# Patient Record
Sex: FEMALE | Race: WHITE | NOT HISPANIC OR LATINO | Employment: PART TIME | URBAN - METROPOLITAN AREA
[De-identification: names, ages, dates, MRNs, and addresses within clinical notes are randomized per-mention and may not be internally consistent; named-entity substitution may affect disease eponyms.]

---

## 2018-11-28 ENCOUNTER — HISTORICAL (OUTPATIENT)
Dept: RADIOLOGY | Facility: HOSPITAL | Age: 43
End: 2018-11-28

## 2019-06-27 ENCOUNTER — HISTORICAL (OUTPATIENT)
Dept: ADMINISTRATIVE | Facility: HOSPITAL | Age: 44
End: 2019-06-27

## 2019-06-27 LAB
ABS NEUT (OLG): 8.41 X10(3)/MCL (ref 2.1–9.2)
ALBUMIN SERPL-MCNC: 3.8 GM/DL (ref 3.4–5)
ALBUMIN/GLOB SERPL: 1.1 RATIO (ref 1.1–2)
ALP SERPL-CCNC: 60 UNIT/L (ref 45–117)
ALT SERPL-CCNC: 26 UNIT/L (ref 12–78)
AST SERPL-CCNC: 26 UNIT/L (ref 15–37)
BASOPHILS # BLD AUTO: 0.05 X10(3)/MCL
BASOPHILS NFR BLD AUTO: 0 %
BILIRUB SERPL-MCNC: 0.4 MG/DL (ref 0.2–1)
BILIRUBIN DIRECT+TOT PNL SERPL-MCNC: 0.1 MG/DL
BILIRUBIN DIRECT+TOT PNL SERPL-MCNC: 0.3 MG/DL
BUN SERPL-MCNC: 9 MG/DL (ref 7–18)
CALCIUM SERPL-MCNC: 8.8 MG/DL (ref 8.5–10.1)
CHLORIDE SERPL-SCNC: 103 MMOL/L (ref 98–107)
CHOLEST SERPL-MCNC: 184 MG/DL
CHOLEST/HDLC SERPL: 4.3 {RATIO} (ref 0–4.4)
CO2 SERPL-SCNC: 27 MMOL/L (ref 21–32)
CREAT SERPL-MCNC: 0.7 MG/DL (ref 0.6–1.3)
EOSINOPHIL # BLD AUTO: 0.08 10*3/UL
EOSINOPHIL NFR BLD AUTO: 1 %
ERYTHROCYTE [DISTWIDTH] IN BLOOD BY AUTOMATED COUNT: 17.6 % (ref 11.5–14.5)
EST. AVERAGE GLUCOSE BLD GHB EST-MCNC: 166 MG/DL
GLOBULIN SER-MCNC: 3.5 GM/ML (ref 2.3–3.5)
GLUCOSE SERPL-MCNC: 136 MG/DL (ref 74–106)
HBA1C MFR BLD: 7.4 % (ref 4.2–6.3)
HCT VFR BLD AUTO: 38.5 % (ref 35–46)
HDLC SERPL-MCNC: 43 MG/DL
HGB BLD-MCNC: 11.8 GM/DL (ref 12–16)
IMM GRANULOCYTES # BLD AUTO: 0.04 10*3/UL
IMM GRANULOCYTES NFR BLD AUTO: 0 %
LDLC SERPL CALC-MCNC: 109 MG/DL (ref 0–130)
LYMPHOCYTES # BLD AUTO: 1.84 X10(3)/MCL
LYMPHOCYTES NFR BLD AUTO: 16 % (ref 13–40)
MCH RBC QN AUTO: 23.7 PG (ref 26–34)
MCHC RBC AUTO-ENTMCNC: 30.6 GM/DL (ref 31–37)
MCV RBC AUTO: 77.3 FL (ref 80–100)
MONOCYTES # BLD AUTO: 0.79 X10(3)/MCL
MONOCYTES NFR BLD AUTO: 7 % (ref 4–12)
NEUTROPHILS # BLD AUTO: 8.41 X10(3)/MCL
NEUTROPHILS NFR BLD AUTO: 75 X10(3)/MCL
PLATELET # BLD AUTO: 251 X10(3)/MCL (ref 130–400)
PMV BLD AUTO: 11.6 FL (ref 7.4–10.4)
POTASSIUM SERPL-SCNC: 3.5 MMOL/L (ref 3.5–5.1)
PROT SERPL-MCNC: 7.3 GM/DL (ref 6.4–8.2)
RBC # BLD AUTO: 4.98 X10(6)/MCL (ref 4–5.2)
SODIUM SERPL-SCNC: 136 MMOL/L (ref 136–145)
T3FREE SERPL-MCNC: 2.45 PG/ML (ref 2.18–3.98)
T4 FREE SERPL-MCNC: 1.14 NG/DL (ref 0.76–1.46)
TRIGL SERPL-MCNC: 160 MG/DL
TSH SERPL-ACNC: 0.58 MIU/L (ref 0.36–3.74)
VLDLC SERPL CALC-MCNC: 32 MG/DL
WBC # SPEC AUTO: 11.2 X10(3)/MCL (ref 4.5–11)

## 2019-07-16 ENCOUNTER — HISTORICAL (OUTPATIENT)
Dept: ADMINISTRATIVE | Facility: HOSPITAL | Age: 44
End: 2019-07-16

## 2019-07-16 LAB
INR PPP: 2.22 (ref 0.9–1.2)
PROTHROMBIN TIME: 24.7 SECOND(S) (ref 11.9–14.4)

## 2019-07-17 ENCOUNTER — HISTORICAL (OUTPATIENT)
Dept: RADIOLOGY | Facility: HOSPITAL | Age: 44
End: 2019-07-17

## 2019-08-19 ENCOUNTER — HISTORICAL (OUTPATIENT)
Dept: RADIOLOGY | Facility: HOSPITAL | Age: 44
End: 2019-08-19

## 2020-03-11 ENCOUNTER — HISTORICAL (OUTPATIENT)
Dept: ADMINISTRATIVE | Facility: HOSPITAL | Age: 45
End: 2020-03-11

## 2020-03-11 LAB
ABS NEUT (OLG): 6.08 X10(3)/MCL (ref 2.1–9.2)
ALBUMIN SERPL-MCNC: 3.6 GM/DL (ref 3.4–5)
ALBUMIN/GLOB SERPL: 0.9 RATIO (ref 1.1–2)
ALP SERPL-CCNC: 59 UNIT/L (ref 45–117)
ALT SERPL-CCNC: 22 UNIT/L (ref 12–78)
AST SERPL-CCNC: 12 UNIT/L (ref 15–37)
BASOPHILS # BLD AUTO: 0 X10(3)/MCL (ref 0–0.2)
BASOPHILS NFR BLD AUTO: 0 %
BILIRUB SERPL-MCNC: 0.3 MG/DL (ref 0.2–1)
BILIRUBIN DIRECT+TOT PNL SERPL-MCNC: 0.1 MG/DL (ref 0–0.2)
BILIRUBIN DIRECT+TOT PNL SERPL-MCNC: 0.2 MG/DL
BUN SERPL-MCNC: 10 MG/DL (ref 7–18)
CALCIUM SERPL-MCNC: 8.5 MG/DL (ref 8.5–10.1)
CHLORIDE SERPL-SCNC: 106 MMOL/L (ref 98–107)
CHOLEST SERPL-MCNC: 190 MG/DL
CHOLEST/HDLC SERPL: 5.4 {RATIO} (ref 0–4.4)
CO2 SERPL-SCNC: 28 MMOL/L (ref 21–32)
CREAT SERPL-MCNC: 0.6 MG/DL (ref 0.6–1.3)
EOSINOPHIL # BLD AUTO: 0.1 X10(3)/MCL (ref 0–0.9)
EOSINOPHIL NFR BLD AUTO: 1 %
ERYTHROCYTE [DISTWIDTH] IN BLOOD BY AUTOMATED COUNT: 16.8 % (ref 11.5–14.5)
EST. AVERAGE GLUCOSE BLD GHB EST-MCNC: 189 MG/DL
GLOBULIN SER-MCNC: 3.9 GM/ML (ref 2.3–3.5)
GLUCOSE SERPL-MCNC: 152 MG/DL (ref 74–106)
HBA1C MFR BLD: 8.2 % (ref 4.2–6.3)
HCT VFR BLD AUTO: 42.4 % (ref 35–46)
HDLC SERPL-MCNC: 35 MG/DL (ref 40–59)
HGB BLD-MCNC: 12.6 GM/DL (ref 12–16)
IMM GRANULOCYTES # BLD AUTO: 0.04 10*3/UL
IMM GRANULOCYTES NFR BLD AUTO: 0 %
INR PPP: 1.66 (ref 0.9–1.2)
LDLC SERPL CALC-MCNC: 89 MG/DL
LYMPHOCYTES # BLD AUTO: 2.1 X10(3)/MCL (ref 0.6–4.6)
LYMPHOCYTES NFR BLD AUTO: 24 %
MCH RBC QN AUTO: 22.2 PG (ref 26–34)
MCHC RBC AUTO-ENTMCNC: 29.7 GM/DL (ref 31–37)
MCV RBC AUTO: 74.8 FL (ref 80–100)
MONOCYTES # BLD AUTO: 0.5 X10(3)/MCL (ref 0.1–1.3)
MONOCYTES NFR BLD AUTO: 5 %
NEUTROPHILS # BLD AUTO: 6.08 X10(3)/MCL (ref 2.1–9.2)
NEUTROPHILS NFR BLD AUTO: 69 %
PLATELET # BLD AUTO: 144 X10(3)/MCL (ref 130–400)
PMV BLD AUTO: 11.7 FL (ref 7.4–10.4)
POTASSIUM SERPL-SCNC: 4.6 MMOL/L (ref 3.5–5.1)
PROT SERPL-MCNC: 7.5 GM/DL (ref 6.4–8.2)
PROTHROMBIN TIME: 19.6 SECOND(S) (ref 11.9–14.4)
RBC # BLD AUTO: 5.67 X10(6)/MCL (ref 4–5.2)
SODIUM SERPL-SCNC: 138 MMOL/L (ref 136–145)
TRIGL SERPL-MCNC: 328 MG/DL
TSH SERPL-ACNC: 3.15 MIU/L (ref 0.36–3.74)
VLDLC SERPL CALC-MCNC: 66 MG/DL
WBC # SPEC AUTO: 8.8 X10(3)/MCL (ref 4.5–11)

## 2020-09-22 ENCOUNTER — HISTORICAL (OUTPATIENT)
Dept: CARDIOLOGY | Facility: CLINIC | Age: 45
End: 2020-09-22

## 2020-09-22 LAB
ALBUMIN SERPL-MCNC: 3.6 GM/DL (ref 3.4–5)
ALBUMIN/GLOB SERPL: 1 RATIO (ref 1.1–2)
ALP SERPL-CCNC: 60 UNIT/L (ref 45–117)
ALT SERPL-CCNC: 28 UNIT/L (ref 12–78)
AST SERPL-CCNC: 32 UNIT/L (ref 15–37)
BILIRUB SERPL-MCNC: 0.4 MG/DL (ref 0.2–1)
BILIRUBIN DIRECT+TOT PNL SERPL-MCNC: <0.1 MG/DL (ref 0–0.2)
BILIRUBIN DIRECT+TOT PNL SERPL-MCNC: ABNORMAL MG/DL
BUN SERPL-MCNC: 10 MG/DL (ref 7–18)
CALCIUM SERPL-MCNC: 8.8 MG/DL (ref 8.5–10.1)
CHLORIDE SERPL-SCNC: 101 MMOL/L (ref 98–107)
CHOLEST SERPL-MCNC: 215 MG/DL
CHOLEST/HDLC SERPL: 4.8 {RATIO} (ref 0–4.4)
CO2 SERPL-SCNC: 28 MMOL/L (ref 21–32)
CREAT SERPL-MCNC: 0.8 MG/DL (ref 0.6–1.3)
GLOBULIN SER-MCNC: 3.6 GM/ML (ref 2.3–3.5)
GLUCOSE SERPL-MCNC: 177 MG/DL (ref 74–106)
HDLC SERPL-MCNC: 45 MG/DL (ref 40–59)
LDLC SERPL CALC-MCNC: 107 MG/DL
POTASSIUM SERPL-SCNC: 4 MMOL/L (ref 3.5–5.1)
PROT SERPL-MCNC: 7.2 GM/DL (ref 6.4–8.2)
SODIUM SERPL-SCNC: 136 MMOL/L (ref 136–145)
TRIGL SERPL-MCNC: 315 MG/DL
VLDLC SERPL CALC-MCNC: 63 MG/DL

## 2021-05-10 ENCOUNTER — HISTORICAL (OUTPATIENT)
Dept: INTERNAL MEDICINE | Facility: CLINIC | Age: 46
End: 2021-05-10

## 2021-05-10 LAB
ABS NEUT (OLG): 8.73 X10(3)/MCL (ref 2.1–9.2)
ALBUMIN SERPL-MCNC: 3.8 GM/DL (ref 3.5–5)
ALBUMIN/GLOB SERPL: 1.2 RATIO (ref 1.1–2)
ALP SERPL-CCNC: 60 UNIT/L (ref 40–150)
ALT SERPL-CCNC: 17 UNIT/L (ref 0–55)
AST SERPL-CCNC: 12 UNIT/L (ref 5–34)
BASOPHILS # BLD AUTO: 0.1 X10(3)/MCL (ref 0–0.2)
BASOPHILS NFR BLD AUTO: 1 %
BILIRUB SERPL-MCNC: 0.4 MG/DL
BILIRUBIN DIRECT+TOT PNL SERPL-MCNC: 0.2 MG/DL (ref 0–0.5)
BILIRUBIN DIRECT+TOT PNL SERPL-MCNC: 0.2 MG/DL (ref 0–0.8)
BUN SERPL-MCNC: 10.6 MG/DL (ref 7–18.7)
CALCIUM SERPL-MCNC: 8.9 MG/DL (ref 8.4–10.2)
CHLORIDE SERPL-SCNC: 101 MMOL/L (ref 98–107)
CHOLEST SERPL-MCNC: 190 MG/DL
CHOLEST/HDLC SERPL: 4 {RATIO} (ref 0–5)
CO2 SERPL-SCNC: 23 MMOL/L (ref 22–29)
CREAT SERPL-MCNC: 0.75 MG/DL (ref 0.55–1.02)
CREAT UR-MCNC: 66.2 MG/DL (ref 45–106)
EOSINOPHIL # BLD AUTO: 0.2 X10(3)/MCL (ref 0–0.9)
EOSINOPHIL NFR BLD AUTO: 2 %
ERYTHROCYTE [DISTWIDTH] IN BLOOD BY AUTOMATED COUNT: 17.8 % (ref 11.5–14.5)
EST. AVERAGE GLUCOSE BLD GHB EST-MCNC: 185.8 MG/DL
GLOBULIN SER-MCNC: 3.2 GM/DL (ref 2.4–3.5)
GLUCOSE SERPL-MCNC: 193 MG/DL (ref 74–100)
HBA1C MFR BLD: 8.1 %
HCT VFR BLD AUTO: 39.1 % (ref 35–46)
HDLC SERPL-MCNC: 44 MG/DL (ref 35–60)
HGB BLD-MCNC: 11.3 GM/DL (ref 12–16)
IMM GRANULOCYTES # BLD AUTO: 0.07 10*3/UL
IMM GRANULOCYTES NFR BLD AUTO: 1 %
LDLC SERPL CALC-MCNC: 96 MG/DL (ref 50–140)
LYMPHOCYTES # BLD AUTO: 2.6 X10(3)/MCL (ref 0.6–4.6)
LYMPHOCYTES NFR BLD AUTO: 21 %
MCH RBC QN AUTO: 21.1 PG (ref 26–34)
MCHC RBC AUTO-ENTMCNC: 28.9 GM/DL (ref 31–37)
MCV RBC AUTO: 73.1 FL (ref 80–100)
MICROALBUMIN UR-MCNC: 24 MG/L
MICROALBUMIN/CREAT RATIO PNL UR: 36.3 MG/GM CR (ref 0–30)
MONOCYTES # BLD AUTO: 0.6 X10(3)/MCL (ref 0.1–1.3)
MONOCYTES NFR BLD AUTO: 5 %
NEUTROPHILS # BLD AUTO: 8.73 X10(3)/MCL (ref 2.1–9.2)
NEUTROPHILS NFR BLD AUTO: 71 %
PLATELET # BLD AUTO: 206 X10(3)/MCL (ref 130–400)
PMV BLD AUTO: 11.5 FL (ref 7.4–10.4)
POTASSIUM SERPL-SCNC: 3.8 MMOL/L (ref 3.5–5.1)
PROT SERPL-MCNC: 7 GM/DL (ref 6.4–8.3)
RBC # BLD AUTO: 5.35 X10(6)/MCL (ref 4–5.2)
SODIUM SERPL-SCNC: 138 MMOL/L (ref 136–145)
T4 FREE SERPL-MCNC: 0.88 NG/DL (ref 0.7–1.48)
TRIGL SERPL-MCNC: 249 MG/DL (ref 37–140)
TSH SERPL-ACNC: 4.39 UIU/ML (ref 0.35–4.94)
VLDLC SERPL CALC-MCNC: 50 MG/DL
WBC # SPEC AUTO: 12.3 X10(3)/MCL (ref 4.5–11)

## 2021-05-31 ENCOUNTER — HISTORICAL (OUTPATIENT)
Dept: RADIOLOGY | Facility: HOSPITAL | Age: 46
End: 2021-05-31

## 2021-06-04 ENCOUNTER — HISTORICAL (OUTPATIENT)
Dept: RADIOLOGY | Facility: HOSPITAL | Age: 46
End: 2021-06-04

## 2021-11-16 ENCOUNTER — HISTORICAL (OUTPATIENT)
Dept: INTERNAL MEDICINE | Facility: CLINIC | Age: 46
End: 2021-11-16

## 2021-11-16 LAB
ABS NEUT (OLG): 7.68 X10(3)/MCL (ref 2.1–9.2)
ALBUMIN SERPL-MCNC: 4.1 GM/DL (ref 3.5–5)
ALBUMIN/GLOB SERPL: 1.3 RATIO (ref 1.1–2)
ALP SERPL-CCNC: 47 UNIT/L (ref 40–150)
ALT SERPL-CCNC: 13 UNIT/L (ref 0–55)
ANISOCYTOSIS BLD QL SMEAR: NORMAL
AST SERPL-CCNC: 17 UNIT/L (ref 5–34)
BASOPHILS # BLD AUTO: 0.1 X10(3)/MCL (ref 0–0.2)
BASOPHILS NFR BLD AUTO: 1 %
BILIRUB SERPL-MCNC: <1 MG/DL
BILIRUBIN DIRECT+TOT PNL SERPL-MCNC: 0.2 MG/DL (ref 0–0.5)
BILIRUBIN DIRECT+TOT PNL SERPL-MCNC: <0.8 MG/DL (ref 0–0.8)
BUN SERPL-MCNC: 11.6 MG/DL (ref 7–18.7)
CALCIUM SERPL-MCNC: 9.1 MG/DL (ref 8.7–10.5)
CHLORIDE SERPL-SCNC: 104 MMOL/L (ref 98–107)
CHOLEST SERPL-MCNC: 171 MG/DL
CHOLEST/HDLC SERPL: 4 {RATIO} (ref 0–5)
CO2 SERPL-SCNC: 24 MMOL/L (ref 22–29)
CREAT SERPL-MCNC: 0.69 MG/DL (ref 0.55–1.02)
EOSINOPHIL # BLD AUTO: 0.1 X10(3)/MCL (ref 0–0.9)
EOSINOPHIL NFR BLD AUTO: 1 %
ERYTHROCYTE [DISTWIDTH] IN BLOOD BY AUTOMATED COUNT: 18.8 % (ref 11.5–14.5)
GLOBULIN SER-MCNC: 3.1 GM/DL (ref 2.4–3.5)
GLUCOSE SERPL-MCNC: 158 MG/DL (ref 74–100)
HCT VFR BLD AUTO: 35.8 % (ref 35–46)
HDLC SERPL-MCNC: 44 MG/DL (ref 35–60)
HGB BLD-MCNC: 10.3 GM/DL (ref 12–16)
IMM GRANULOCYTES # BLD AUTO: 0.03 10*3/UL
IMM GRANULOCYTES NFR BLD AUTO: 0 %
LDLC SERPL CALC-MCNC: 91 MG/DL (ref 50–140)
LYMPHOCYTES # BLD AUTO: 2 X10(3)/MCL (ref 0.6–4.6)
LYMPHOCYTES NFR BLD AUTO: 19 %
MCH RBC QN AUTO: 18.8 PG (ref 26–34)
MCHC RBC AUTO-ENTMCNC: 28.8 GM/DL (ref 31–37)
MCV RBC AUTO: 65.3 FL (ref 80–100)
MICROCYTES BLD QL SMEAR: NORMAL
MONOCYTES # BLD AUTO: 0.5 X10(3)/MCL (ref 0.1–1.3)
MONOCYTES NFR BLD AUTO: 5 %
NEUTROPHILS # BLD AUTO: 7.68 X10(3)/MCL (ref 2.1–9.2)
NEUTROPHILS NFR BLD AUTO: 74 %
NRBC BLD AUTO-RTO: 0 % (ref 0–0.2)
PLATELET # BLD AUTO: 248 X10(3)/MCL (ref 130–400)
PLATELET # BLD EST: ADEQUATE 10*3/UL
PMV BLD AUTO: 10.6 FL (ref 7.4–10.4)
POLYCHROMASIA BLD QL SMEAR: NORMAL
POTASSIUM SERPL-SCNC: 3.5 MMOL/L (ref 3.5–5.1)
PROT SERPL-MCNC: 7.2 GM/DL (ref 6.4–8.3)
RBC # BLD AUTO: 5.48 X10(6)/MCL (ref 4–5.2)
SODIUM SERPL-SCNC: 139 MMOL/L (ref 136–145)
T4 FREE SERPL-MCNC: 1.1 NG/DL (ref 0.7–1.48)
TRIGL SERPL-MCNC: 179 MG/DL (ref 37–140)
TSH SERPL-ACNC: 0.66 UIU/ML (ref 0.35–4.94)
VLDLC SERPL CALC-MCNC: 36 MG/DL
WBC # SPEC AUTO: 10.3 X10(3)/MCL (ref 4.5–11)

## 2021-12-01 ENCOUNTER — HISTORICAL (OUTPATIENT)
Dept: ADMINISTRATIVE | Facility: HOSPITAL | Age: 46
End: 2021-12-01

## 2021-12-06 LAB — FINAL CULTURE: NORMAL

## 2022-04-10 ENCOUNTER — HISTORICAL (OUTPATIENT)
Dept: ADMINISTRATIVE | Facility: HOSPITAL | Age: 47
End: 2022-04-10
Payer: MEDICAID

## 2022-04-27 VITALS
SYSTOLIC BLOOD PRESSURE: 128 MMHG | HEIGHT: 64 IN | OXYGEN SATURATION: 98 % | DIASTOLIC BLOOD PRESSURE: 72 MMHG | BODY MASS INDEX: 34.7 KG/M2 | WEIGHT: 203.25 LBS

## 2022-05-03 NOTE — HISTORICAL [facility] CERNER
This is a historical note converted from Marcail. Formatting and pictures may have been removed.  Please reference Marcial for original formatting and attached multimedia. Chief Complaint  New Patient states has mitral valve and wants her thyroid checked and wants a referral to gyn back pain and sciatica  Physical Exam  Vitals & Measurements  T:?36.7? ?C (Oral)? HR:?69(Peripheral)? RR:?18? BP:?129/84?  HT:?162?cm? WT:?96.8?kg? BMI:?36.88?  Assessment/Plan  1.?Aortic valve stenosis with insufficiency?I35.2  ?sees cards  Ordered:  CBC w/ Auto Diff, Routine collect, *Est. 06/27/19 3:00:00 CDT, Blood, Order for future visit, *Est. Stop date 06/27/19 3:00:00 CDT, Lab Collect, Aortic valve stenosis with insufficiency  Obesity  MVP (mitral valve prolapse)  Edema, 06/27/19 9:33:00 CDT  Clinic Follow up, *Est. 12/27/19 3:00:00 CST, Order for future visit, Aortic valve stenosis with insufficiency  Obesity  MVP (mitral valve prolapse)  Edema, [facility]  Comprehensive Metabolic Panel, Routine collect, *Est. 06/27/19 3:00:00 CDT, Blood, Order for future visit, *Est. Stop date 06/27/19 3:00:00 CDT, Lab Collect, Aortic valve stenosis with insufficiency  Obesity  MVP (mitral valve prolapse)  Edema, 06/27/19 9:33:00 CDT  Free T4, Routine collect, *Est. 06/27/19 3:00:00 CDT, Blood, Order for future visit, *Est. Stop date 06/27/19 3:00:00 CDT, Lab Collect, Aortic valve stenosis with insufficiency  Obesity  MVP (mitral valve prolapse)  Edema, 06/27/19 9:33:00 CDT  Hemoglobin A1C [facility], Routine collect, *Est. 06/27/19 3:00:00 CDT, Blood, Order for future visit, *Est. Stop date 06/27/19 3:00:00 CDT, Lab Collect, Aortic valve stenosis with insufficiency  Obesity  MVP (mitral valve prolapse)  Edema, 06/27/19 9:33:00 CDT  Lipid Panel, Routine collect, *Est. 06/27/19 3:00:00 CDT, Blood, Order for future visit, *Est. Stop date 06/27/19 3:00:00 CDT, Lab Collect, Aortic valve stenosis with insufficiency  Obesity  MVP (mitral  valve prolapse)  Edema, 06/27/19 9:33:00 CDT  Office/Outpatient Visit Level 4 Established [id number] PC, Aortic valve stenosis with insufficiency  Obesity  MVP (mitral valve prolapse)  Edema, [facility], 06/27/19 9:33:00 CDT  T3 Free, Routine collect, *Est. 06/27/19 3:00:00 CDT, Blood, Order for future visit, *Est. Stop date 06/27/19 3:00:00 CDT, Lab Collect, Aortic valve stenosis with insufficiency  Obesity  MVP (mitral valve prolapse)  Edema, 06/27/19 9:33:00 CDT  Thyroid Stimulating Hormone, Routine collect, *Est. 06/27/19 3:00:00 CDT, Blood, Order for future visit, *Est. Stop date 06/27/19 3:00:00 CDT, Lab Collect, Aortic valve stenosis with insufficiency  Obesity  MVP (mitral valve prolapse)  Edema, 06/27/19 9:33:00 CDT  ?  2.?Obesity?E66.9  ?dash  Ordered:  CBC w/ Auto Diff, Routine collect, *Est. 06/27/19 3:00:00 CDT, Blood, Order for future visit, *Est. Stop date 06/27/19 3:00:00 CDT, Lab Collect, Aortic valve stenosis with insufficiency  Obesity  MVP (mitral valve prolapse)  Edema, 06/27/19 9:33:00 CDT  Clinic Follow up, *Est. 12/27/19 3:00:00 CST, Order for future visit, Aortic valve stenosis with insufficiency  Obesity  MVP (mitral valve prolapse)  Edema, [facility]  Comprehensive Metabolic Panel, Routine collect, *Est. 06/27/19 3:00:00 CDT, Blood, Order for future visit, *Est. Stop date 06/27/19 3:00:00 CDT, Lab Collect, Aortic valve stenosis with insufficiency  Obesity  MVP (mitral valve prolapse)  Edema, 06/27/19 9:33:00 CDT  Free T4, Routine collect, *Est. 06/27/19 3:00:00 CDT, Blood, Order for future visit, *Est. Stop date 06/27/19 3:00:00 CDT, Lab Collect, Aortic valve stenosis with insufficiency  Obesity  MVP (mitral valve prolapse)  Edema, 06/27/19 9:33:00 CDT  Hemoglobin A1C [facility], Routine collect, *Est. 06/27/19 3:00:00 CDT, Blood, Order for future visit, *Est. Stop date 06/27/19 3:00:00 CDT, Lab Collect, Aortic valve stenosis with insufficiency  Obesity  MVP (mitral valve  prolapse)  Edema, 06/27/19 9:33:00 CDT  Lipid Panel, Routine collect, *Est. 06/27/19 3:00:00 CDT, Blood, Order for future visit, *Est. Stop date 06/27/19 3:00:00 CDT, Lab Collect, Aortic valve stenosis with insufficiency  Obesity  MVP (mitral valve prolapse)  Edema, 06/27/19 9:33:00 CDT  Office/Outpatient Visit Level 4 Established [id number] PC, Aortic valve stenosis with insufficiency  Obesity  MVP (mitral valve prolapse)  Edema, [facility], 06/27/19 9:33:00 CDT  T3 Free, Routine collect, *Est. 06/27/19 3:00:00 CDT, Blood, Order for future visit, *Est. Stop date 06/27/19 3:00:00 CDT, Lab Collect, Aortic valve stenosis with insufficiency  Obesity  MVP (mitral valve prolapse)  Edema, 06/27/19 9:33:00 CDT  Thyroid Stimulating Hormone, Routine collect, *Est. 06/27/19 3:00:00 CDT, Blood, Order for future visit, *Est. Stop date 06/27/19 3:00:00 CDT, Lab Collect, Aortic valve stenosis with insufficiency  Obesity  MVP (mitral valve prolapse)  Edema, 06/27/19 9:33:00 CDT  ?  3.?MVP (mitral valve prolapse)?I34.1  ?stable  Ordered:  CBC w/ Auto Diff, Routine collect, *Est. 06/27/19 3:00:00 CDT, Blood, Order for future visit, *Est. Stop date 06/27/19 3:00:00 CDT, Lab Collect, Aortic valve stenosis with insufficiency  Obesity  MVP (mitral valve prolapse)  Edema, 06/27/19 9:33:00 CDT  Clinic Follow up, *Est. 12/27/19 3:00:00 CST, Order for future visit, Aortic valve stenosis with insufficiency  Obesity  MVP (mitral valve prolapse)  Edema, [facility]  Comprehensive Metabolic Panel, Routine collect, *Est. 06/27/19 3:00:00 CDT, Blood, Order for future visit, *Est. Stop date 06/27/19 3:00:00 CDT, Lab Collect, Aortic valve stenosis with insufficiency  Obesity  MVP (mitral valve prolapse)  Edema, 06/27/19 9:33:00 CDT  Free T4, Routine collect, *Est. 06/27/19 3:00:00 CDT, Blood, Order for future visit, *Est. Stop date 06/27/19 3:00:00 CDT, Lab Collect, Aortic valve stenosis with insufficiency  Obesity  MVP  (mitral valve prolapse)  Edema, 06/27/19 9:33:00 CDT  Hemoglobin A1C [facility], Routine collect, *Est. 06/27/19 3:00:00 CDT, Blood, Order for future visit, *Est. Stop date 06/27/19 3:00:00 CDT, Lab Collect, Aortic valve stenosis with insufficiency  Obesity  MVP (mitral valve prolapse)  Edema, 06/27/19 9:33:00 CDT  Lipid Panel, Routine collect, *Est. 06/27/19 3:00:00 CDT, Blood, Order for future visit, *Est. Stop date 06/27/19 3:00:00 CDT, Lab Collect, Aortic valve stenosis with insufficiency  Obesity  MVP (mitral valve prolapse)  Edema, 06/27/19 9:33:00 CDT  Office/Outpatient Visit Level 4 Established [id number] PC, Aortic valve stenosis with insufficiency  Obesity  MVP (mitral valve prolapse)  Edema, [facility] Int [facility], 06/27/19 9:33:00 CDT  T3 Free, Routine collect, *Est. 06/27/19 3:00:00 CDT, Blood, Order for future visit, *Est. Stop date 06/27/19 3:00:00 CDT, Lab Collect, Aortic valve stenosis with insufficiency  Obesity  MVP (mitral valve prolapse)  Edema, 06/27/19 9:33:00 CDT  Thyroid Stimulating Hormone, Routine collect, *Est. 06/27/19 3:00:00 CDT, Blood, Order for future visit, *Est. Stop date 06/27/19 3:00:00 CDT, Lab Collect, Aortic valve stenosis with insufficiency  Obesity  MVP (mitral valve prolapse)  Edema, 06/27/19 9:33:00 CDT  ?  4.?Edema?R60.9  ?stable  Ordered:  CBC w/ Auto Diff, Routine collect, *Est. 06/27/19 3:00:00 CDT, Blood, Order for future visit, *Est. Stop date 06/27/19 3:00:00 CDT, Lab Collect, Aortic valve stenosis with insufficiency  Obesity  MVP (mitral valve prolapse)  Edema, 06/27/19 9:33:00 CDT  Clinic Follow up, *Est. 12/27/19 3:00:00 CST, Order for future visit, Aortic valve stenosis with insufficiency  Obesity  MVP (mitral valve prolapse)  Edema, [facility]  Comprehensive Metabolic Panel, Routine collect, *Est. 06/27/19 3:00:00 CDT, Blood, Order for future visit, *Est. Stop date 06/27/19 3:00:00 CDT, Lab Collect, Aortic valve stenosis with insufficiency  Obesity  MVP  (mitral valve prolapse)  Edema, 06/27/19 9:33:00 CDT  Free T4, Routine collect, *Est. 06/27/19 3:00:00 CDT, Blood, Order for future visit, *Est. Stop date 06/27/19 3:00:00 CDT, Lab Collect, Aortic valve stenosis with insufficiency  Obesity  MVP (mitral valve prolapse)  Edema, 06/27/19 9:33:00 CDT  Hemoglobin A1C [facility], Routine collect, *Est. 06/27/19 3:00:00 CDT, Blood, Order for future visit, *Est. Stop date 06/27/19 3:00:00 CDT, Lab Collect, Aortic valve stenosis with insufficiency  Obesity  MVP (mitral valve prolapse)  Edema, 06/27/19 9:33:00 CDT  Lipid Panel, Routine collect, *Est. 06/27/19 3:00:00 CDT, Blood, Order for future visit, *Est. Stop date 06/27/19 3:00:00 CDT, Lab Collect, Aortic valve stenosis with insufficiency  Obesity  MVP (mitral valve prolapse)  Edema, 06/27/19 9:33:00 CDT  Office/Outpatient Visit Level 4 Established [id number] PC, Aortic valve stenosis with insufficiency  Obesity  MVP (mitral valve prolapse)  Edema, [facility] Int [facility] C, 06/27/19 9:33:00 CDT  T3 Free, Routine collect, *Est. 06/27/19 3:00:00 CDT, Blood, Order for future visit, *Est. Stop date 06/27/19 3:00:00 CDT, Lab Collect, Aortic valve stenosis with insufficiency  Obesity  MVP (mitral valve prolapse)  Edema, 06/27/19 9:33:00 CDT  Thyroid Stimulating Hormone, Routine collect, *Est. 06/27/19 3:00:00 CDT, Blood, Order for future visit, *Est. Stop date 06/27/19 3:00:00 CDT, Lab Collect, Aortic valve stenosis with insufficiency  Obesity  MVP (mitral valve prolapse)  Edema, 06/27/19 9:33:00 CDT  ?  Referrals  Clinic Follow up, *Est. 12/27/19 3:00:00 CST, Order for future visit, Aortic valve stenosis with insufficiency  Obesity  MVP (mitral valve prolapse)  Edema, [facility]   Problem List/Past Medical History  Ongoing  Aortic valve stenosis with insufficiency  Edema  MVP (mitral valve prolapse)  Obesity  Historical  Hypothyroid  Procedure/Surgical History  Bypass Valve Replacement Redo Aortic (.)  (10/15/2018)  Performance of Cardiac Output, Single, Manual (10/15/2018)  Replacement of Aortic Valve with Synthetic Substitute, Open Approach (10/15/2018)  Respiratory Ventilation, Less than 24 Consecutive Hours (10/15/2018)  Ultrasonography of Heart with Aorta, Transesophageal (10/15/2018)  Measurement of Cardiac Sampling and Pressure, Right Heart, Percutaneous Approach (10/10/2018)  Insertion of Infusion Device into Right Subclavian Vein, Percutaneous Approach (10/09/2018)  Transesophageal Echo (for Surgery) (.) (10/08/2018)  Ultrasonography of Heart with Aorta, Transesophageal (10/08/2018)  Ultrasonography of Heart with Aorta (10/05/2018)  Performance of Cardiac Output, Continuous (10/04/2018)  Mitral valve operation (2012)   section (10/27/2004)   section (1998)   section (1994)  Bilateral tubal ligation   Medications  ACETAMINOPHEN-COD #3 TABLET, 1 tab(s), Oral, q6hr,? ?Not Taking, Completed Rx: Last Dose Date/Time Unknown  Alprazolam 0.25mg Tab!, 0.25 mg= 1 tab(s), Oral, TID,? ?Not Taking, Completed Rx: Last Dose Date/Time Unknown  aspirin 81 mg oral Delayed Release (EC) tablet, 81 mg= 1 tab(s), Oral, Daily  Coumadin 5 mg oral tablet, 5 mg= 1 tab(s), Oral, Daily, 6 refills  Fluoxetine 20mg Cap!, 20 mg= 1 cap(s), Oral, Daily  hydrochlorothiazide-triamterene 25 mg-37.5 mg oral capsule, 1 cap(s), Oral, Daily, 6 refills  Levothyroxine Sodium 0.15mg Tab, 150 mcg= 1 tab(s), Oral, Daily  metoprolol tartrate 25 mg oral tab, 25 mg= 1 tab(s), Oral, BID, 6 refills  OXYCODONE HCL 5 MG TABLET,? ?Not Taking, Completed Rx: Last Dose Date/Time Unknown  ProAir HFA 90 mcg/inh inhalation aerosol with adapter, 2 puff(s), INH, q6hr, PRN  triamcinolone 55 mcg/inh nasal spray, 2 spray(s), Both Nostrils, Daily  warfarin 5 mg oral tablet, See Instructions  warfarin 5 mg oral tablet, See Instructions  Allergies  LaMICtal?(swelling)  Zofran?(whelps, itching)  Social History  Abuse/Neglect  No,  No, Yes, 06/27/2019  Alcohol  Past, 02/12/2019  Employment/School  Employed, Work/School description: physical job. Highest education level: Some college. No, 06/27/2019  Employed, 02/12/2019  Home/Environment  Lives with Spouse. Living situation: Home/Independent. TV/Computer concerns: No. Single family house, 06/27/2019  Lives with Spouse., 02/12/2019  Nutrition/Health  Regular, 02/12/2019  Sexual  Gender Identity Identifies as female., 02/12/2019  Substance Use  Past, 02/12/2019  Tobacco - Denies Tobacco Use, 12/26/2013  Former smoker, quit more than 30 days ago, N/A, 06/27/2019  Family History  Diabetes mellitus type 2: Mother.  Renal failure syndrome.: Mother.  Father: History is unknown  Health Maintenance  Health Maintenance  ???Pending?(in the next year)  ??? ??OverDue  ??? ? ? ?Diabetes Screening due??and every?  ??? ? ? ?Alcohol Misuse Screening due??01/01/19??and every 1??year(s)  ??? ? ? ?Smoking Cessation due??01/01/19??Variable frequency  ??? ??Due?  ??? ? ? ?Coronary Artery Disease Maintenance-Lipid Lowering Therapy due??06/27/19??and every?  ??? ? ? ?Tetanus Vaccine due??06/27/19??and every 10??year(s)  ??? ??Due In Future?  ??? ? ? ?Obesity Screening not due until??01/01/20??and every 1??year(s)  ??? ? ? ?Hypertension Management-BMP not due until??04/18/20??and every 1??year(s)  ??? ? ? ?Blood Pressure Screening not due until??06/26/20??and every 1??year(s)  ??? ? ? ?Body Mass Index Check not due until??06/26/20??and every 1??year(s)  ??? ? ? ?Hypertension Management-Blood Pressure not due until??06/26/20??and every 1??year(s)  ???Satisfied?(in the past 1 year)  ??? ??Satisfied?  ??? ? ? ?ADL Screening on??06/27/19.??Satisfied by Lavell MENDOZA Diaz Aerial  ??? ? ? ?Blood Pressure Screening on??06/27/19.??Satisfied by Diaz Monte LPN Aerial  ??? ? ? ?Body Mass Index Check on??06/27/19.??Satisfied by Lavell MENDOZA Diaz Aerial  ??? ? ? ?Coronary Artery Disease Maintenance-Antiplatelet Agent  Prescribed on??06/12/19.??Satisfied by Julia Alonso MD  ??? ? ? ?Depression Screening on??06/27/19.??Satisfied by Diaz Monte LPN Aerial  ??? ? ? ?Diabetes Screening on??04/19/19.??Satisfied by Briseida Colindres  ??? ? ? ?Hypertension Management-Blood Pressure on??06/27/19.??Satisfied by Diaz Monte LPN Aerial  ??? ? ? ?Influenza Vaccine on??01/03/19.??Satisfied by Xavier TORRES, Flores PINK  ??? ? ? ?Obesity Screening on??06/27/19.??Satisfied by Lavell MENDOZA, Diaz Aerial  ?

## 2022-05-03 NOTE — HISTORICAL [facility] CERNER
This is a historical note converted from Marcial. Formatting and pictures may have been removed.  Please reference Marcial for original formatting and attached multimedia. Chief Complaint  HERE TODAY FOR FOLLOW UP AND RISK ASSESSMENT FOR DENTAL PROCEDURE WITH SEDATION, C/O SOB, OCCASIONAL LEG AND FEET SWELLING,  History of Present Illness  45 year-old? female with a past medical history of?bioprosthetic aortic valve?replacement in 2012?2/2 endocarditis?with revisional?mechanical aortic valve replacement?in?October?2018 presents for 4 month follow up and results of Echocardiogram.??She completed an Echocardiogram on June 4, 2021 which revealed an ejection fraction of approximately 60%,?normally functioning mechanical prosthetic aortic valve, mild AR, and mild TR. ?Of note, after her?valve replacement surgery in 2018,?she developed A Fib and remains on Coumadin.??  ?   Today she reports she has been dealing with?tooth abscesses?for 2 months and has taken several courses of antibiotics. She is scheduled for?dental work with sedation. She reports?for the last 2-3 weeks she has been complaining of fatigue, no fevers but has chills and feels cold and has?night sweats. She is concerned about endocarditis.  she lost 35 lbs and reports?significant improvement of shortness of breath. She is active both at home and?at work. ??  ?   Echocardiogram June 4, 2021:  Left ventricular ejection fraction is measured at approximately 60%.  Structurally normal mitral valve.  Trace mitral regurgitation.  Normally functioning mechanical prosthetic valve in aortic position.  Mild aortic regurgitation present.  Peak velocity across the aortic valve is 2.6m/sec.? Peak gradient is 27.27 mmHg.? Mean gradient is 13.54mmHg.  Tricuspid valve is structurally normal.  There is mild tricuspid regurgitation with RVSP estimated 49 mmHg.  Mitral valve is structurally normal.  Moderately dilated left atrium.  Mildly dilated right atrium.  Right  ventricle global systolic function is mildly reduced.  IVC normal.  ?   Echocardiogram March 2020:  EF 55 to 65%.? Normal left ventricle diastolic function.  Mild tricuspid regurgitation  Mild pulmonary hypertension present  Aortic valve: There is a mechanical valve present  Prosthetic aortic valve function is normal.  ?  ?   TTE (Nov 2018):  Left ventricular ejection fraction is measured at approximately 65%.  Right ventricle global systolic function is mildly reduced.  Normal size right ventricle cavity.  Mechanical prosthetic valve present in the aortic position.  Aortic valve mean gradient of 12.85 mm Hg.  ?  Review of Systems  Constitutional: negative for fever,chills, sweats, weakness, fatigue, decreased activity?????  Eye: negative for blurry vision, vision change  ENMT: negative for sore throat, nasal congestion  Respiratory: negative?for shortness of breath, cough, wheezing  Cardiovascular: negative for chest pain, dyspnea on exertion, orthopnea, PND, lower extremity edema, palpitations, claudication  Gastrointestinal: negative?for nausea, vomiting, abdominal pain, constipation, diarrhea, blood in stool  Genitourinary: negative for hematuria, nocturia, dysuria  Endocrine: negative for abnormal thirst, temperature  Musculoskeletal: negative for back pain, joint pain  Integumentary: negative for abnormal mole  Neurologic: negative for weakness, numbness, headaches, shooting pain  Hematology: negative for easy bruising, easy bleeding  Allergy: negative for watery eyes, sneezing  Psychiatric: negative for loss of interest, anxiety, stress  ?  Physical Exam  Vitals & Measurements  T:?36.8? ?C (Oral)? HR:?62(Peripheral)? RR:?20? BP:?128/72? SpO2:?98%? HT:?162.00?cm? WT:?92.2?kg? WT:?92.2?kg? WT:?92.200?kg?  General: alert and oriented/no acute distress  Eye: EOMI/normal conjunctiva/no xanthelasma  HENT: normocephalic/moist oral mucosa  Neck: supple/nontender/no carotid bruit  Respiratory: lungs CTA/nonlabored  respirations/BS equal/symmetrical expansion/no chest wall tenderness  Cardiovascular: normal rate/normal rhythm/no murmur/normal peripheral perfusion/no edema/no JVD  Gastrointestinal: soft/nontender  Musculoskeletal: normal ROM  Integumentary: warm/dry/pink/intact  Neurologic: alert/oriented/normal sensory/no focal deficits  Psychiatric: cooperative/appropriate mood and affect/normal judgment  Assessment/Plan  Preoperative risk assessment  Patient is scheduled for dental?work with sedation.? She is able to perform >?4 METS with no symptoms. ?RCRI?score is 0 which indicates?low risk?of cardiac?complications?for low risk procedure.  Regarding anticoagulation, recommended?she hold his Coumadin for 5 days prior to the procedure?and bridge with?Lovenox?1 mg/kg?twice daily.  In principle?she does not require?bridging?but?given that she has been subtherapeutic?recently, I?recommend bridging.  Patient has?done bridging in the past and is familiar?with?considering?the?injection.  Advised her to continue taking aspirin?81 mg daily  ?   Mechanical Aortic Valve Replacement 2/2 failure of bioprosthetic aortic valve  Mechanical aortic valve s/p replacement in Oct 2018  EF?60% per Echo?June 2021?- normal?mechanical aortic valve function, mild AR?  Continue INR checks Coumadin clinic  ?   Atrial Fibrillation 2/2 post-op mechanical valve replacement  Echo June 2021 -?EF 60%??  Continue Metoprolol Tartrate 25mg BID??  Continue Coumadin monitoring  ?   DM  A1C not at goal - Last A1C - 8.1 over patient has lost?35 pounds recently and suspect her?A1c has improved  Defer management to PCP  ?   Obesity  Patient has lost 35 pounds, she reports?her breathing has significantly improved. ?Congratulated her?and encouraged her to keep it up  ?   HLD  Lipid panel improved at 91 in 11/20211 still not at goal?for her history of diabetes  Pt agrees to start lipitor 20mg  FLP in 2 months  ?  Lovenox bridging  Lipitor  FLP in 2 months  RTC in 6  months  ?   Problem List/Past Medical History  Ongoing  Aortic valve stenosis with insufficiency  Edema  MVP (mitral valve prolapse)  Obesity  Historical  Hypothyroid  Procedure/Surgical History  Bypass Valve Replacement Redo Aortic (.) (10/15/2018)  Performance of Cardiac Output, Single, Manual (10/15/2018)  Replacement of Aortic Valve with Synthetic Substitute, Open Approach (10/15/2018)  Respiratory Ventilation, Less than 24 Consecutive Hours (10/15/2018)  Ultrasonography of Heart with Aorta, Transesophageal (10/15/2018)  Measurement of Cardiac Sampling and Pressure, Right Heart, Percutaneous Approach (10/10/2018)  Insertion of Infusion Device into Right Subclavian Vein, Percutaneous Approach (10/09/2018)  Transesophageal Echo (for Surgery) (.) (10/08/2018)  Ultrasonography of Heart with Aorta, Transesophageal (10/08/2018)  Ultrasonography of Heart with Aorta (10/05/2018)  Performance of Cardiac Output, Continuous (10/04/2018)  Mitral valve operation (2012)   section (10/27/2004)   section (1998)   section (1994)  Bilateral tubal ligation   Medications  aspirin 81 mg oral Delayed Release (EC) tablet, 81 mg= 1 tab(s), Oral, Daily, 11 refills  busPIRone 15 mg oral tablet, 15 mg= 1 tab(s), Oral, TID, 11 refills  cyclobenzaprine 10 mg oral tablet, 10 mg= 1 tab(s), Oral, TID, 3 refills  Fergon 240 mg (27 mg elemental iron) oral tablet, 240 mg= 1 tab(s), Oral, TID, 5 refills  FLUoxetine 40 mg oral capsule, 40 mg= 1 cap(s), Oral, Daily, 1 refills  hydrochlorothiazide-triamterene 25 mg-37.5 mg oral capsule, 1 cap(s), Oral, Daily, 11 refills,? ?Still taking, not as prescribed: TAKES ABOUT ONCE PER WEEK`  hydrOXYzine hydrochloride 25 mg oral tablet, 25 mg= 1 tab(s), Oral, Daily  levothyroxine 150 mcg (0.15 mg) oral tablet, 150 mcg= 1 tab(s), Oral, Daily, 3 refills  metFORMIN 1000 mg oral tablet, 1000 mg= 1 tab(s), Oral, BID, 3 refills  metoprolol tartrate 25 mg oral tab, 25 mg= 1  tab(s), Oral, BID, 11 refills  traZODone 100 mg oral tablet, 100 mg= 1 tab(s), Oral, Once a day (at bedtime), 11 refills,? ?Not taking  Ventolin HFA 90 mcg/inh inhalation aerosol, 1 puff(s), INH, q6hr, PRN, 3 refills  warfarin 6 mg oral tablet, 6 mg= 1 tab(s), Oral, At Bedtime, 1 refills  warfarin 6 mg oral tablet, 6 mg= 1 tab(s), Oral, At Bedtime, 1 refills,? ?Not taking: dulp  Allergies  ondansetron?(Eruption)  LaMICtal?(swelling)  Zofran?(whelps, itching)  Social History  Abuse/Neglect  No, 12/01/2021  Alcohol  Past, 12/01/2021  Employment/School  Employed, Work/School description: physical job. Highest education level: Some college. No, 06/27/2019  Employed, 02/12/2019  Home/Environment  Lives with Spouse. Living situation: Home/Independent. TV/Computer concerns: No. Single family house, 06/27/2019  Lives with Spouse., 02/12/2019    Never in , 05/17/2021  Nutrition/Health  Regular, 02/12/2019  Sexual  Gender Identity Identifies as female., 02/12/2019  Spiritual/Cultural  Sabianism, 05/17/2021  Substance Use  Past, 05/17/2021  Tobacco - Denies Tobacco Use, 12/26/2013  Former smoker, quit more than 30 days ago, Cigarettes, N/A, 12/01/2021  Family History  Diabetes mellitus type 2: Mother.  Renal failure syndrome.: Mother.  Father: History is unknown  Health Maintenance  Health Maintenance  ???Pending?(in the next year)  ??? ??OverDue  ??? ? ? ?Cervical Cancer Screening due??10/29/20??and every 3??year(s)  ??? ? ? ?Alcohol Misuse Screening due??01/02/21??and every 1??year(s)  ??? ??Due In Future?  ??? ? ? ?Obesity Screening not due until??01/01/22??and every 1??year(s)  ??? ? ? ?Hypertension Management-BMP not due until??11/16/22??and every 1??year(s)  ??? ? ? ?Blood Pressure Screening not due until??11/30/22??and every 1??year(s)  ??? ? ? ?Body Mass Index Check not due until??11/30/22??and every 1??year(s)  ??? ? ? ?Depression Screening not due until??11/30/22??and every 1??year(s)  ??? ? ?  ?Hypertension Management-Blood Pressure not due until??11/30/22??and every 1??year(s)  ??? ? ? ?ADL Screening not due until??11/30/22??and every 1??year(s)  ???Satisfied?(in the past 1 year)  ??? ??Satisfied?  ??? ? ? ?ADL Screening on??11/30/21.??Satisfied by Cinthia Rey LPN  ??? ? ? ?Blood Pressure Screening on??12/01/21.??Satisfied by Renetta Manzanares RN.  ??? ? ? ?Body Mass Index Check on??12/01/21.??Satisfied by Renetta Manzanares RN  ??? ? ? ?Breast Cancer Screening on??05/31/21.??Satisfied by Gissel Saldaña  ??? ? ? ?Coronary Artery Disease Maintenance-Antiplatelet Agent Prescribed on??11/16/21.??Satisfied by Julia Alonso MD  ??? ? ? ?Depression Screening on??11/30/21.??Satisfied by Cinthia Rey LPN  ??? ? ? ?Diabetes Maintenance-Fasting Lipid Profile on??11/16/21.??Satisfied by Gurpreet Smith.  ??? ? ? ?Diabetes Screening on??11/16/21.??Satisfied by Gurpreet Smith.  ??? ? ? ?Hypertension Management-Blood Pressure on??12/01/21.??Satisfied by Renetta Manzanares RN  ??? ? ? ?Influenza Vaccine on??12/01/21.??Satisfied by Renetta Manzaanres RN.  ??? ? ? ?Lipid Screening on??11/16/21.??Satisfied by Gurpreet Smith.  ??? ? ? ?Obesity Screening on??12/01/21.??Satisfied by Renetta Manzanares RN  ?      Patient is concerned she could have endocarditis given fatigue and chills for the last few weeks int he setting of dental abscesses. I explained to her that endocarditis would not be as indolent but I will order blood cultures for confirmation.

## 2022-05-03 NOTE — HISTORICAL [facility] CERNER
This is a historical note converted from Marcial. Formatting and pictures may have been removed.  Please reference Marcial for original formatting and attached multimedia. Chief Complaint  New Patient states has mitral valve and wants her thyroid checked and wants a referral to gyn back pain and sciatica  History of Present Illness  42 yo wf with valvular heart diseae, htn, hypothyroid, obesity, insomnia, restless leg  Review of Systems  ne cv, neg pul, neg gi  Physical Exam  Vitals & Measurements  T:?36.7? ?C (Oral)? HR:?69(Peripheral)? RR:?18? BP:?129/84?  HT:?162?cm? WT:?96.8?kg? BMI:?36.88?  aax4 nad  lorna eomi  cv rrr s1s2 no mgr  lungs cta no cr or whz  abd nt soft  ext no edema  neuro intact  Assessment/Plan  1.?Aortic valve stenosis with insufficiency?I35.2  ?recent sx  Ordered:  CBC w/ Auto Diff, Routine collect, *Est. 06/27/19 3:00:00 CDT, Blood, Order for future visit, *Est. Stop date 06/27/19 3:00:00 CDT, Lab Collect, Aortic valve stenosis with insufficiency  Obesity  MVP (mitral valve prolapse)  Edema, 06/27/19 9:33:00 CDT  Clinic Follow up, *Est. 12/27/19 3:00:00 CST, Order for future visit, Aortic valve stenosis with insufficiency  Obesity  MVP (mitral valve prolapse)  Edema, [facility]  Comprehensive Metabolic Panel, Routine collect, *Est. 06/27/19 3:00:00 CDT, Blood, Order for future visit, *Est. Stop date 06/27/19 3:00:00 CDT, Lab Collect, Aortic valve stenosis with insufficiency  Obesity  MVP (mitral valve prolapse)  Edema, 06/27/19 9:33:00 CDT  Free T4, Routine collect, *Est. 06/27/19 3:00:00 CDT, Blood, Order for future visit, *Est. Stop date 06/27/19 3:00:00 CDT, Lab Collect, Aortic valve stenosis with insufficiency  Obesity  MVP (mitral valve prolapse)  Edema, 06/27/19 9:33:00 CDT  Hemoglobin A1C [facility], Routine collect, *Est. 06/27/19 3:00:00 CDT, Blood, Order for future visit, *Est. Stop date 06/27/19 3:00:00 CDT, Lab Collect, Aortic valve stenosis with insufficiency  Obesity   MVP (mitral valve prolapse)  Edema, 06/27/19 9:33:00 CDT  Lipid Panel, Routine collect, *Est. 06/27/19 3:00:00 CDT, Blood, Order for future visit, *Est. Stop date 06/27/19 3:00:00 CDT, Lab Collect, Aortic valve stenosis with insufficiency  Obesity  MVP (mitral valve prolapse)  Edema, 06/27/19 9:33:00 CDT  Office/Outpatient Visit Level 4 Established [id number] PC, Aortic valve stenosis with insufficiency  Obesity  MVP (mitral valve prolapse)  Edema, [facility], 06/27/19 9:33:00 CDT  T3 Free, Routine collect, *Est. 06/27/19 3:00:00 CDT, Blood, Order for future visit, *Est. Stop date 06/27/19 3:00:00 CDT, Lab Collect, Aortic valve stenosis with insufficiency  Obesity  MVP (mitral valve prolapse)  Edema, 06/27/19 9:33:00 CDT  Thyroid Stimulating Hormone, Routine collect, *Est. 06/27/19 3:00:00 CDT, Blood, Order for future visit, *Est. Stop date 06/27/19 3:00:00 CDT, Lab Collect, Aortic valve stenosis with insufficiency  Obesity  MVP (mitral valve prolapse)  Edema, 06/27/19 9:33:00 CDT  ?  2.?Obesity?E66.9  ?dash  Ordered:  CBC w/ Auto Diff, Routine collect, *Est. 06/27/19 3:00:00 CDT, Blood, Order for future visit, *Est. Stop date 06/27/19 3:00:00 CDT, Lab Collect, Aortic valve stenosis with insufficiency  Obesity  MVP (mitral valve prolapse)  Edema, 06/27/19 9:33:00 CDT  Clinic Follow up, *Est. 12/27/19 3:00:00 CST, Order for future visit, Aortic valve stenosis with insufficiency  Obesity  MVP (mitral valve prolapse)  Edema, [facility]  Comprehensive Metabolic Panel, Routine collect, *Est. 06/27/19 3:00:00 CDT, Blood, Order for future visit, *Est. Stop date 06/27/19 3:00:00 CDT, Lab Collect, Aortic valve stenosis with insufficiency  Obesity  MVP (mitral valve prolapse)  Edema, 06/27/19 9:33:00 CDT  Free T4, Routine collect, *Est. 06/27/19 3:00:00 CDT, Blood, Order for future visit, *Est. Stop date 06/27/19 3:00:00 CDT, Lab Collect, Aortic valve stenosis with insufficiency  Obesity  MVP (mitral  valve prolapse)  Edema, 06/27/19 9:33:00 CDT  Hemoglobin A1C [facility], Routine collect, *Est. 06/27/19 3:00:00 CDT, Blood, Order for future visit, *Est. Stop date 06/27/19 3:00:00 CDT, Lab Collect, Aortic valve stenosis with insufficiency  Obesity  MVP (mitral valve prolapse)  Edema, 06/27/19 9:33:00 CDT  Lipid Panel, Routine collect, *Est. 06/27/19 3:00:00 CDT, Blood, Order for future visit, *Est. Stop date 06/27/19 3:00:00 CDT, Lab Collect, Aortic valve stenosis with insufficiency  Obesity  MVP (mitral valve prolapse)  Edema, 06/27/19 9:33:00 CDT  Office/Outpatient Visit Level 4 Established [id number] PC, Aortic valve stenosis with insufficiency  Obesity  MVP (mitral valve prolapse)  Edema, [facility], 06/27/19 9:33:00 CDT  T3 Free, Routine collect, *Est. 06/27/19 3:00:00 CDT, Blood, Order for future visit, *Est. Stop date 06/27/19 3:00:00 CDT, Lab Collect, Aortic valve stenosis with insufficiency  Obesity  MVP (mitral valve prolapse)  Edema, 06/27/19 9:33:00 CDT  Thyroid Stimulating Hormone, Routine collect, *Est. 06/27/19 3:00:00 CDT, Blood, Order for future visit, *Est. Stop date 06/27/19 3:00:00 CDT, Lab Collect, Aortic valve stenosis with insufficiency  Obesity  MVP (mitral valve prolapse)  Edema, 06/27/19 9:33:00 CDT  ?  3.?MVP (mitral valve prolapse)?I34.1  ?sees cards  Ordered:  CBC w/ Auto Diff, Routine collect, *Est. 06/27/19 3:00:00 CDT, Blood, Order for future visit, *Est. Stop date 06/27/19 3:00:00 CDT, Lab Collect, Aortic valve stenosis with insufficiency  Obesity  MVP (mitral valve prolapse)  Edema, 06/27/19 9:33:00 CDT  Clinic Follow up, *Est. 12/27/19 3:00:00 CST, Order for future visit, Aortic valve stenosis with insufficiency  Obesity  MVP (mitral valve prolapse)  Edema, [facility]  Comprehensive Metabolic Panel, Routine collect, *Est. 06/27/19 3:00:00 CDT, Blood, Order for future visit, *Est. Stop date 06/27/19 3:00:00 CDT, Lab Collect, Aortic valve stenosis with insufficiency   Obesity  MVP (mitral valve prolapse)  Edema, 06/27/19 9:33:00 CDT  Free T4, Routine collect, *Est. 06/27/19 3:00:00 CDT, Blood, Order for future visit, *Est. Stop date 06/27/19 3:00:00 CDT, Lab Collect, Aortic valve stenosis with insufficiency  Obesity  MVP (mitral valve prolapse)  Edema, 06/27/19 9:33:00 CDT  Hemoglobin A1C [facility], Routine collect, *Est. 06/27/19 3:00:00 CDT, Blood, Order for future visit, *Est. Stop date 06/27/19 3:00:00 CDT, Lab Collect, Aortic valve stenosis with insufficiency  Obesity  MVP (mitral valve prolapse)  Edema, 06/27/19 9:33:00 CDT  Lipid Panel, Routine collect, *Est. 06/27/19 3:00:00 CDT, Blood, Order for future visit, *Est. Stop date 06/27/19 3:00:00 CDT, Lab Collect, Aortic valve stenosis with insufficiency  Obesity  MVP (mitral valve prolapse)  Edema, 06/27/19 9:33:00 CDT  Office/Outpatient Visit Level 4 Established [id number] PC, Aortic valve stenosis with insufficiency  Obesity  MVP (mitral valve prolapse)  Edema, [facility], 06/27/19 9:33:00 CDT  T3 Free, Routine collect, *Est. 06/27/19 3:00:00 CDT, Blood, Order for future visit, *Est. Stop date 06/27/19 3:00:00 CDT, Lab Collect, Aortic valve stenosis with insufficiency  Obesity  MVP (mitral valve prolapse)  Edema, 06/27/19 9:33:00 CDT  Thyroid Stimulating Hormone, Routine collect, *Est. 06/27/19 3:00:00 CDT, Blood, Order for future visit, *Est. Stop date 06/27/19 3:00:00 CDT, Lab Collect, Aortic valve stenosis with insufficiency  Obesity  MVP (mitral valve prolapse)  Edema, 06/27/19 9:33:00 CDT  ?  4.?Edema?R60.9  ?stable  Ordered:  CBC w/ Auto Diff, Routine collect, *Est. 06/27/19 3:00:00 CDT, Blood, Order for future visit, *Est. Stop date 06/27/19 3:00:00 CDT, Lab Collect, Aortic valve stenosis with insufficiency  Obesity  MVP (mitral valve prolapse)  Edema, 06/27/19 9:33:00 CDT  Clinic Follow up, *Est. 12/27/19 3:00:00 CST, Order for future visit, Aortic valve stenosis with insufficiency  Obesity  MVP  (mitral valve prolapse)  Edema, [facility]  Comprehensive Metabolic Panel, Routine collect, *Est. 06/27/19 3:00:00 CDT, Blood, Order for future visit, *Est. Stop date 06/27/19 3:00:00 CDT, Lab Collect, Aortic valve stenosis with insufficiency  Obesity  MVP (mitral valve prolapse)  Edema, 06/27/19 9:33:00 CDT  Free T4, Routine collect, *Est. 06/27/19 3:00:00 CDT, Blood, Order for future visit, *Est. Stop date 06/27/19 3:00:00 CDT, Lab Collect, Aortic valve stenosis with insufficiency  Obesity  MVP (mitral valve prolapse)  Edema, 06/27/19 9:33:00 CDT  Hemoglobin A1C [facility], Routine collect, *Est. 06/27/19 3:00:00 CDT, Blood, Order for future visit, *Est. Stop date 06/27/19 3:00:00 CDT, Lab Collect, Aortic valve stenosis with insufficiency  Obesity  MVP (mitral valve prolapse)  Edema, 06/27/19 9:33:00 CDT  Lipid Panel, Routine collect, *Est. 06/27/19 3:00:00 CDT, Blood, Order for future visit, *Est. Stop date 06/27/19 3:00:00 CDT, Lab Collect, Aortic valve stenosis with insufficiency  Obesity  MVP (mitral valve prolapse)  Edema, 06/27/19 9:33:00 CDT  Office/Outpatient Visit Level 4 Established [id number] PC, Aortic valve stenosis with insufficiency  Obesity  MVP (mitral valve prolapse)  Edema, [facility], 06/27/19 9:33:00 CDT  T3 Free, Routine collect, *Est. 06/27/19 3:00:00 CDT, Blood, Order for future visit, *Est. Stop date 06/27/19 3:00:00 CDT, Lab Collect, Aortic valve stenosis with insufficiency  Obesity  MVP (mitral valve prolapse)  Edema, 06/27/19 9:33:00 CDT  Thyroid Stimulating Hormone, Routine collect, *Est. 06/27/19 3:00:00 CDT, Blood, Order for future visit, *Est. Stop date 06/27/19 3:00:00 CDT, Lab Collect, Aortic valve stenosis with insufficiency  Obesity  MVP (mitral valve prolapse)  Edema, 06/27/19 9:33:00 CDT  ?  Referrals  Clinic Follow up, *Est. 12/27/19 3:00:00 CST, Order for future visit, Aortic valve stenosis with insufficiency  Obesity  MVP (mitral valve prolapse)  Edema,  [facility]   Problem List/Past Medical History  Ongoing  Aortic valve stenosis with insufficiency  Edema  MVP (mitral valve prolapse)  Obesity  Historical  Hypothyroid  Procedure/Surgical History  Bypass Valve Replacement Redo Aortic (.) (10/15/2018)  Performance of Cardiac Output, Single, Manual (10/15/2018)  Replacement of Aortic Valve with Synthetic Substitute, Open Approach (10/15/2018)  Respiratory Ventilation, Less than 24 Consecutive Hours (10/15/2018)  Ultrasonography of Heart with Aorta, Transesophageal (10/15/2018)  Measurement of Cardiac Sampling and Pressure, Right Heart, Percutaneous Approach (10/10/2018)  Insertion of Infusion Device into Right Subclavian Vein, Percutaneous Approach (10/09/2018)  Transesophageal Echo (for Surgery) (.) (10/08/2018)  Ultrasonography of Heart with Aorta, Transesophageal (10/08/2018)  Ultrasonography of Heart with Aorta (10/05/2018)  Performance of Cardiac Output, Continuous (10/04/2018)  Mitral valve operation (2012)   section (10/27/2004)   section (1998)   section (1994)  Bilateral tubal ligation   Medications  ACETAMINOPHEN-COD #3 TABLET, 1 tab(s), Oral, q6hr,? ?Not Taking, Completed Rx: Last Dose Date/Time Unknown  Alprazolam 0.25mg Tab!, 0.25 mg= 1 tab(s), Oral, TID,? ?Not Taking, Completed Rx: Last Dose Date/Time Unknown  aspirin 81 mg oral Delayed Release (EC) tablet, 81 mg= 1 tab(s), Oral, Daily  Coumadin 5 mg oral tablet, 5 mg= 1 tab(s), Oral, Daily, 6 refills  Fluoxetine 20mg Cap!, 20 mg= 1 cap(s), Oral, Daily  hydrochlorothiazide-triamterene 25 mg-37.5 mg oral capsule, 1 cap(s), Oral, Daily, 6 refills  Levothyroxine Sodium 0.15mg Tab, 150 mcg= 1 tab(s), Oral, Daily  metoprolol tartrate 25 mg oral tab, 25 mg= 1 tab(s), Oral, BID, 6 refills  OXYCODONE HCL 5 MG TABLET,? ?Not Taking, Completed Rx: Last Dose Date/Time Unknown  ProAir HFA 90 mcg/inh inhalation aerosol with adapter, 2 puff(s), INH, q6hr, PRN  triamcinolone  55 mcg/inh nasal spray, 2 spray(s), Both Nostrils, Daily  warfarin 5 mg oral tablet, See Instructions  warfarin 5 mg oral tablet, See Instructions  Allergies  LaMICtal?(swelling)  Zofran?(whelps, itching)  Social History  Abuse/Neglect  No, No, Yes, 06/27/2019  Alcohol  Past, 02/12/2019  Employment/School  Employed, Work/School description: physical job. Highest education level: Some college. No, 06/27/2019  Employed, 02/12/2019  Home/Environment  Lives with Spouse. Living situation: Home/Independent. TV/Computer concerns: No. Single family house, 06/27/2019  Lives with Spouse., 02/12/2019  Nutrition/Health  Regular, 02/12/2019  Sexual  Gender Identity Identifies as female., 02/12/2019  Substance Use  Past, 02/12/2019  Tobacco - Denies Tobacco Use, 12/26/2013  Former smoker, quit more than 30 days ago, N/A, 06/27/2019  Family History  Diabetes mellitus type 2: Mother.  Renal failure syndrome.: Mother.  Father: History is unknown  Health Maintenance  Health Maintenance  ???Pending?(in the next year)  ??? ??OverDue  ??? ? ? ?Diabetes Screening due??and every?  ??? ? ? ?Alcohol Misuse Screening due??01/01/19??and every 1??year(s)  ??? ? ? ?Smoking Cessation due??01/01/19??Variable frequency  ??? ??Due?  ??? ? ? ?Coronary Artery Disease Maintenance-Lipid Lowering Therapy due??06/27/19??and every?  ??? ? ? ?Tetanus Vaccine due??06/27/19??and every 10??year(s)  ??? ??Due In Future?  ??? ? ? ?Obesity Screening not due until??01/01/20??and every 1??year(s)  ??? ? ? ?Hypertension Management-BMP not due until??04/18/20??and every 1??year(s)  ??? ? ? ?Blood Pressure Screening not due until??06/26/20??and every 1??year(s)  ??? ? ? ?Body Mass Index Check not due until??06/26/20??and every 1??year(s)  ??? ? ? ?Hypertension Management-Blood Pressure not due until??06/26/20??and every 1??year(s)  ???Satisfied?(in the past 1 year)  ??? ??Satisfied?  ??? ? ? ?ADL Screening on??06/27/19.??Satisfied by Lavell MENDOZA, Diaz Muse  ??? ?  ? ?Blood Pressure Screening on??06/27/19.??Satisfied by Lavell MENDOZA Diaz Aerial  ??? ? ? ?Body Mass Index Check on??06/27/19.??Satisfied by Diaz Monte LPN Aerial  ??? ? ? ?Coronary Artery Disease Maintenance-Antiplatelet Agent Prescribed on??06/12/19.??Satisfied by Julia Alonso MD  ??? ? ? ?Depression Screening on??06/27/19.??Satisfied by Diaz Monte LPN Aerial  ??? ? ? ?Diabetes Screening on??04/19/19.??Satisfied by Briseida Colindres  ??? ? ? ?Hypertension Management-Blood Pressure on??06/27/19.??Satisfied by Diaz Monte LPN Aerial  ??? ? ? ?Influenza Vaccine on??01/03/19.??Satisfied by Flores Park RN  ??? ? ? ?Obesity Screening on??06/27/19.??Satisfied by Lavell MENDOZA Diaz Aerial  ?

## 2022-05-03 NOTE — HISTORICAL [facility] CERNER
This is a historical note converted from Marcial. Formatting and pictures may have been removed.  Please reference Marcial for original formatting and attached multimedia. Chief Complaint  was in [facility] for upper resp infection and pneumonia  History of Present Illness  43 yo wf with recent pneumonia had fever at home, depressionhtn,, valvular heart disease, hypothyroid, restlss leg  Review of Systems  neg cv pleurisy  Physical Exam  Vitals & Measurements  T:?36.5? ?C (Oral)? HR:?77(Peripheral)? RR:?18? BP:?122/71?  HT:?165?cm? WT:?96.4?kg? BMI:?35.41? LMP:?02/27/2020 00:00 CST?  Assessment/Plan  1.?Obesity?E66.9  ?dash  ?  2.?Tobacco user?Z72.0  ?stop  ?  3.?Pneumonia?J18.9  ?anbx  Ordered:  CBC w/ Auto Diff, Routine collect, *Est. 03/11/20 3:00:00 CDT, Blood, Order for future visit, *Est. Stop date 03/11/20 3:00:00 CDT, Lab Collect, Pneumonia, 03/11/20 12:46:00 CDT  Clinic Follow up, *Est. 09/11/20 3:00:00 CDT, Order for future visit, Pneumonia, [facility]  Comprehensive Metabolic Panel, Routine collect, *Est. 03/11/20 3:00:00 CDT, Blood, Order for future visit, *Est. Stop date 03/11/20 3:00:00 CDT, Lab Collect, Pneumonia, 03/11/20 12:46:00 CDT  Hemoglobin A1C [facility], Routine collect, *Est. 03/11/20 3:00:00 CDT, Blood, Order for future visit, *Est. Stop date 03/11/20 3:00:00 CDT, Lab Collect, Pneumonia, 03/11/20 12:46:00 CDT  Lipid Panel, Routine collect, *Est. 03/11/20 3:00:00 CDT, Blood, Order for future visit, *Est. Stop date 03/11/20 3:00:00 CDT, Lab Collect, Pneumonia, 03/11/20 12:46:00 CDT  Office/Outpatient Visit Level 3 Established [id number] PC, Pneumonia, [facility], 03/11/20 12:46:00 CDT  Thyroid Stimulating Hormone, Routine collect, *Est. 03/11/20 3:00:00 CDT, Blood, Order for future visit, *Est. Stop date 03/11/20 3:00:00 CDT, Lab Collect, Pneumonia, 03/11/20 12:46:00 CDT  XR Chest 2 Views, Routine, *Est. 03/11/20 3:00:00 CDT, Pneumonia, None, Ambulatory, Rad Type, Order for future visit,  Pneumonia, Not Scheduled, *Est. 20 3:00:00 CDT  ?  Referrals  Clinic Follow up, *Est. 20 3:00:00 CDT, Order for future visit, Pneumonia, [facility]   Problem List/Past Medical History  Ongoing  Aortic valve stenosis with insufficiency  Edema  MVP (mitral valve prolapse)  Obesity  Historical  Hypothyroid  Procedure/Surgical History  Bypass Valve Replacement Redo Aortic (.) (10/15/2018)  Performance of Cardiac Output, Single, Manual (10/15/2018)  Replacement of Aortic Valve with Synthetic Substitute, Open Approach (10/15/2018)  Respiratory Ventilation, Less than 24 Consecutive Hours (10/15/2018)  Ultrasonography of Heart with Aorta, Transesophageal (10/15/2018)  Measurement of Cardiac Sampling and Pressure, Right Heart, Percutaneous Approach (10/10/2018)  Insertion of Infusion Device into Right Subclavian Vein, Percutaneous Approach (10/09/2018)  Transesophageal Echo (for Surgery) (.) (10/08/2018)  Ultrasonography of Heart with Aorta, Transesophageal (10/08/2018)  Ultrasonography of Heart with Aorta (10/05/2018)  Performance of Cardiac Output, Continuous (10/04/2018)  Mitral valve operation (2012)   section (10/27/2004)   section (1998)   section (1994)  Bilateral tubal ligation   Medications  aspirin 81 mg oral Delayed Release (EC) tablet, 81 mg= 1 tab(s), Oral, Daily  busPIRone 15 mg oral tablet, 15 mg= 1 tab(s), Oral, TID, 11 refills  Coumadin 5 mg oral tablet, 5 mg= 1 tab(s), Oral, Daily, 6 refills  dextromethorphan-promethazine 15 mg-6.25 mg/5 mL oral syrup, 5 mL, Oral, q6hr, PRN  Flonase 50 mcg/inh nasal spray, 1 spray(s), Nasal, BID  fluoxetine 20 mg oral capsule, 20 mg= 1 cap(s), Oral, Daily, 3 refills  hydrochlorothiazide-triamterene 25 mg-37.5 mg oral capsule, 1 cap(s), Oral, Daily, 11 refills  levothyroxine 150 mcg (0.15 mg) oral tablet, 150 mcg= 1 tab(s), Oral, Daily, 3 refills  metoprolol tartrate 25 mg oral tab, 25 mg= 1 tab(s), Oral, BID, 6  refills  Mirapex 0.5 mg oral tablet, 0.5 mg= 1 tab(s), Oral, TID, 6 refills  Peridex 0.12% topical liquid, 0.018 gm= 15 mL, Oral, BID  Peridex 0.12% topical liquid, 0.018 gm= 15 mL, Oral, BID  Ventolin HFA 90 mcg/inh inhalation aerosol, 1 puff(s), INH, q6hr, PRN  warfarin 5 mg oral tablet, See Instructions  warfarin 5 mg oral tablet, See Instructions  warfarin 5 mg oral tablet, See Instructions,? ?Not taking  warfarin 5 mg oral tablet, See Instructions, 3 refills  warfarin 6 mg oral tablet, 6 mg= 1 tab(s), Oral, Daily, 1 refills  Allergies  LaMICtal?(swelling)  Zofran?(whelps, itching)  Social History  Abuse/Neglect  No, No, Yes, 03/11/2020  Alcohol  Past, 02/12/2019  Employment/School  Employed, Work/School description: physical job. Highest education level: Some college. No, 06/27/2019  Employed, 02/12/2019  Home/Environment  Lives with Spouse. Living situation: Home/Independent. TV/Computer concerns: No. Single family house, 06/27/2019  Lives with Spouse., 02/12/2019  Nutrition/Health  Regular, 02/12/2019  Sexual  Gender Identity Identifies as female., 02/12/2019  Substance Use  Past, 02/12/2019  Tobacco - Denies Tobacco Use, 12/26/2013  Former smoker, quit more than 30 days ago, N/A, 03/11/2020  Family History  Diabetes mellitus type 2: Mother.  Renal failure syndrome.: Mother.  Father: History is unknown  Health Maintenance  Health Maintenance  ???Pending?(in the next year)  ??? ??OverDue  ??? ? ? ?Diabetes Screening due??and every?  ??? ? ? ?Alcohol Misuse Screening due??01/01/20??and every 1??year(s)  ??? ? ? ?Smoking Cessation due??01/01/20??Variable frequency  ??? ??Due?  ??? ? ? ?Coronary Artery Disease Maintenance-Lipid Lowering Therapy due??03/11/20??and every?  ??? ? ? ?Diabetes Maintenance-Eye Exam due??03/11/20??and every?  ??? ? ? ?Diabetes Maintenance-Foot Exam due??03/11/20??and every?  ??? ? ? ?Tetanus Vaccine due??03/11/20??and every 10??year(s)  ??? ??Due In Future?  ??? ? ? ?Diabetes  Maintenance-HgbA1c not due until??06/26/20??and every 1??year(s)  ??? ? ? ?Diabetes Maintenance-Fasting Lipid Profile not due until??06/26/20??and every 1??year(s)  ??? ? ? ?Hypertension Management-BMP not due until??07/27/20??and every 1??year(s)  ??? ? ? ?Cervical Cancer Screening not due until??10/29/20??and every 3??year(s)  ??? ? ? ?Obesity Screening not due until??01/01/21??and every 1??year(s)  ???Satisfied?(in the past 1 year)  ??? ??Satisfied?  ??? ? ? ?ADL Screening on??03/11/20.??Satisfied by Monte LPN, Diaz Aerial  ??? ? ? ?Blood Pressure Screening on??03/11/20.??Satisfied by Monte LPN, Diaz Aerial  ??? ? ? ?Body Mass Index Check on??03/11/20.??Satisfied by Monte LPN, Diaz Aerial  ??? ? ? ?Breast Cancer Screening on??07/17/19.??Satisfied by Maya Sandoval  ??? ? ? ?Coronary Artery Disease Maintenance-Antiplatelet Agent Prescribed on??03/10/20.??Satisfied by Julia Alonso MD  ??? ? ? ?Depression Screening on??06/27/19.??Satisfied by Monte LPN, Diaz Aerial  ??? ? ? ?Diabetes Maintenance-HgbA1c on??06/27/19.??Satisfied by Shelli Butler  ??? ? ? ?Diabetes Screening on??07/28/19.??Satisfied by Kirsty Cronin  ??? ? ? ?Hypertension Management-Blood Pressure on??03/11/20.??Satisfied by Monte LPN, Diaz Aerial  ??? ? ? ?Influenza Vaccine on??03/11/20.??Satisfied by Monte LPN, Diaz Aerial  ??? ? ? ?Obesity Screening on??03/11/20.??Satisfied by Monte LPN, Diaz Aerial  ?

## 2022-05-05 ENCOUNTER — HOSPITAL ENCOUNTER (EMERGENCY)
Facility: HOSPITAL | Age: 47
Discharge: HOME | End: 2022-05-06
Attending: FAMILY MEDICINE
Payer: MEDICAID

## 2022-05-05 DIAGNOSIS — N93.9 ABNORMAL UTERINE BLEEDING (AUB): Primary | ICD-10-CM

## 2022-05-05 DIAGNOSIS — N93.9 VAGINAL BLEEDING: ICD-10-CM

## 2022-05-05 LAB
ALBUMIN SERPL-MCNC: 4.3 GM/DL (ref 3.5–5)
ALBUMIN/GLOB SERPL: 1.4 RATIO (ref 1.1–2)
ALP SERPL-CCNC: 54 UNIT/L (ref 40–150)
ALT SERPL-CCNC: 22 UNIT/L (ref 0–55)
APTT PPP: 45.2 SECONDS (ref 23.2–33.7)
AST SERPL-CCNC: 20 UNIT/L (ref 5–34)
B-HCG UR QL: NEGATIVE
BASOPHILS # BLD AUTO: 0.05 X10(3)/MCL (ref 0–0.2)
BASOPHILS NFR BLD AUTO: 0.5 %
BILIRUBIN DIRECT+TOT PNL SERPL-MCNC: 0.2 MG/DL (ref 0–0.5)
BILIRUBIN DIRECT+TOT PNL SERPL-MCNC: 0.4 MG/DL (ref 0–0.8)
BILIRUBIN DIRECT+TOT PNL SERPL-MCNC: 0.6 MG/DL
BUN SERPL-MCNC: 10.8 MG/DL (ref 7–18.7)
CALCIUM SERPL-MCNC: 9.5 MG/DL (ref 8.4–10.2)
CHLORIDE SERPL-SCNC: 99 MMOL/L (ref 98–107)
CO2 SERPL-SCNC: 29 MMOL/L (ref 22–29)
CREAT SERPL-MCNC: 0.72 MG/DL (ref 0.55–1.02)
CTP QC/QA: YES
EOSINOPHIL # BLD AUTO: 0.06 X10(3)/MCL (ref 0–0.9)
EOSINOPHIL NFR BLD AUTO: 0.6 %
ERYTHROCYTE [DISTWIDTH] IN BLOOD BY AUTOMATED COUNT: 13.7 % (ref 11.5–17)
GLOBULIN SER-MCNC: 3.1 GM/DL (ref 2.4–3.5)
GLUCOSE SERPL-MCNC: 135 MG/DL (ref 74–100)
GROUP & RH: NORMAL
HCT VFR BLD AUTO: 42.6 % (ref 37–47)
HGB BLD-MCNC: 13.6 GM/DL (ref 12–16)
IMM GRANULOCYTES # BLD AUTO: 0.05 X10(3)/MCL (ref 0–0.02)
IMM GRANULOCYTES NFR BLD AUTO: 0.5 % (ref 0–0.43)
INDIRECT COOMBS GEL: NORMAL
INR BLD: 2.81 (ref 0–1.3)
LYMPHOCYTES # BLD AUTO: 2.18 X10(3)/MCL (ref 0.6–4.6)
LYMPHOCYTES NFR BLD AUTO: 22.3 %
MCH RBC QN AUTO: 26.7 PG (ref 27–31)
MCHC RBC AUTO-ENTMCNC: 31.9 MG/DL (ref 33–36)
MCV RBC AUTO: 83.7 FL (ref 80–94)
MONOCYTES # BLD AUTO: 0.46 X10(3)/MCL (ref 0.1–1.3)
MONOCYTES NFR BLD AUTO: 4.7 %
NEUTROPHILS # BLD AUTO: 7 X10(3)/MCL (ref 2.1–9.2)
NEUTROPHILS NFR BLD AUTO: 71.4 %
NRBC BLD AUTO-RTO: 0 %
PLATELET # BLD AUTO: 202 X10(3)/MCL (ref 130–400)
PMV BLD AUTO: 12 FL (ref 9.4–12.4)
POTASSIUM SERPL-SCNC: 3.7 MMOL/L (ref 3.5–5.1)
PROT SERPL-MCNC: 7.4 GM/DL (ref 6.4–8.3)
PROTHROMBIN TIME: 28.8 SECONDS (ref 12.5–14.5)
RBC # BLD AUTO: 5.09 X10(6)/MCL (ref 4.2–5.4)
SODIUM SERPL-SCNC: 137 MMOL/L (ref 136–145)
WBC # SPEC AUTO: 9.8 X10(3)/MCL (ref 4.5–11.5)

## 2022-05-05 PROCEDURE — 85610 PROTHROMBIN TIME: CPT | Performed by: PHYSICIAN ASSISTANT

## 2022-05-05 PROCEDURE — 80053 COMPREHEN METABOLIC PANEL: CPT | Performed by: PHYSICIAN ASSISTANT

## 2022-05-05 PROCEDURE — 86850 RBC ANTIBODY SCREEN: CPT | Performed by: PHYSICIAN ASSISTANT

## 2022-05-05 PROCEDURE — 36415 COLL VENOUS BLD VENIPUNCTURE: CPT | Performed by: PHYSICIAN ASSISTANT

## 2022-05-05 PROCEDURE — 25000003 PHARM REV CODE 250: Performed by: PHYSICIAN ASSISTANT

## 2022-05-05 PROCEDURE — 85025 COMPLETE CBC W/AUTO DIFF WBC: CPT | Performed by: PHYSICIAN ASSISTANT

## 2022-05-05 PROCEDURE — 85730 THROMBOPLASTIN TIME PARTIAL: CPT | Performed by: PHYSICIAN ASSISTANT

## 2022-05-05 PROCEDURE — 81025 URINE PREGNANCY TEST: CPT | Performed by: NURSE PRACTITIONER

## 2022-05-05 PROCEDURE — 99284 EMERGENCY DEPT VISIT MOD MDM: CPT | Mod: 25

## 2022-05-05 RX ORDER — HYDROCODONE BITARTRATE AND ACETAMINOPHEN 10; 325 MG/1; MG/1
1 TABLET ORAL
Status: COMPLETED | OUTPATIENT
Start: 2022-05-05 | End: 2022-05-05

## 2022-05-05 RX ADMIN — HYDROCODONE BITARTRATE AND ACETAMINOPHEN 1 TABLET: 10; 325 TABLET ORAL at 08:05

## 2022-05-05 NOTE — ED PROVIDER NOTES
Name: Zahira Blanchard   Age: 46 y.o.  Sex: female    Chief complaint:   Chief Complaint   Patient presents with    Vaginal Bleeding     Excessive Vaginal Bleeding (used 28 tampons plus pads in 24 hours).  States on Coumadin and Hx anemia.        Patient arrived with: Private  History obtained from: Patient    Subjective:   Patient with hx of DM, HTN, Afrib, and mechanical aortic valve who takes coumadin presents today c/o abnormal uterine bleeding for the last 3 months. Patient says she bleeds every 2 weeks. Current episode started 3 days ago. At present time she reports heavy bleeding with passage of clots. She says she is saturating more than 1 tampon per hour. She also endorses lower pelvic cramping, fatigue, and generalized weakness. Patient says she was advised to come to the ED for evaluation by her coumadin nurse.     Past Medical History:   Diagnosis Date    Anticoagulant long-term use     Diabetes mellitus     Hypertension     Iron deficiency anemia     Paroxysmal atrial fibrillation      Past Surgical History:   Procedure Laterality Date    AORTIC VALVE REPLACEMENT N/A      Social History     Socioeconomic History    Marital status:    Tobacco Use    Smoking status: Never Smoker    Smokeless tobacco: Never Used   Substance and Sexual Activity    Alcohol use: Not Currently    Drug use: Yes     Types: Marijuana     Review of patient's allergies indicates:   Allergen Reactions    Lamotrigine Swelling    Ondansetron Rash        Review of Systems   Constitutional: Positive for malaise/fatigue. Negative for chills and fever.   Respiratory: Negative for shortness of breath.    Cardiovascular: Negative for chest pain and palpitations.   Gastrointestinal: Positive for abdominal pain. Negative for constipation, diarrhea, nausea and vomiting.   Genitourinary: Positive for hematuria. Negative for dysuria.        Vaginal bleeding   Endo/Heme/Allergies: Bruises/bleeds easily.          Objective:      Vitals:    05/06/22 0032   BP: 136/86   Pulse: 84   Resp: 18   Temp:         Physical Exam  Constitutional:       Appearance: Normal appearance.   HENT:      Head: Normocephalic and atraumatic.      Mouth/Throat:      Mouth: Mucous membranes are dry.   Eyes:      Extraocular Movements: Extraocular movements intact.      Conjunctiva/sclera: Conjunctivae normal.   Cardiovascular:      Rate and Rhythm: Normal rate and regular rhythm.      Pulses: Normal pulses.      Heart sounds: Normal heart sounds.   Pulmonary:      Effort: Pulmonary effort is normal.      Breath sounds: Normal breath sounds.   Abdominal:      General: Abdomen is flat.      Palpations: Abdomen is soft.   Musculoskeletal:      Cervical back: Neck supple.   Skin:     General: Skin is warm and dry.      Capillary Refill: Capillary refill takes less than 2 seconds.   Neurological:      General: No focal deficit present.      Mental Status: She is alert and oriented to person, place, and time.   Psychiatric:         Mood and Affect: Mood normal.          Records:  Nursing records and triage records reviewed  Prior records reviewed    Labs:  No results found for this or any previous visit (from the past 24 hour(s)).     Images:  No results found.   Imaging Results          US Pelvis Comp with Transvag NON-OB (xpd (Final result)  Result time 05/06/22 08:47:17    Final result by Doc Urbina MD (05/06/22 08:47:17)                 Impression:      1. Heterogeneous enlargement of the cervical canal likely relates to blood clot.  2. More simple appearing fluid is seen within the endometrial cavity  No significant discrepancy with the preliminary report.      Electronically signed by: Doc Urbina  Date:    05/06/2022  Time:    08:47             Narrative:    EXAMINATION:  US PELVIS COMP WITH TRANSVAG NON-OB (XPD)    CLINICAL HISTORY:  vaginal bleeding;    COMPARISON:  None    FINDINGS:  Transabdominal and transvaginal scanning of the pelvis with  grayscale, color and spectral Doppler.    The anteverted uterus measures 12 cm in length.  The endometrium measures up to about 10 mm which is within normal limits.  There is some fluid along the endometrial cavity which appears simple.  Additionally there is heterogeneous enlargement of the cervical canal suggestive of blood products.  There are some small nabothian cysts as well.    The ovaries are normal in appearance for patient's age with vascular flow demonstrated.    No adnexal mass or significant free fluid.                    Preliminary result by Doc Urbina MD (05/05/22 22:33:51)                 Narrative:    START OF REPORT:  Technique: Transabdominal and transvaginal ultrasound of the pelvis was performed.    Comparison: None.    CLINICAL HISTORY: Vag Bleeding.    Findings:  Uterus: The uterus measures 11 x 6 x 6 cm. The endometrium measures 6 mm in thickness. A complex hypoechoic avascular structure is seen within the cervix which measures 3.6 x 3.7 x 2.6 cm. This may reflect blood clot. There is a 1.3 x 1.3 x 1 cm Nabothian cyst in the cervix. There is mild fluid within the endometrial canal.  Adnexa: No adnexal masses are seen. Bilateral arterial blood flow within the ovaries could not be obtained due to bowel gas. In addition, there is suboptimal visualization of left ovary due to bowel gas.  Right Ovary: The right ovary measures 3 x 3.8 x 2 cm. The right ovarian venous flow is within normal limits.  Left Ovary: The left ovary measures 3.5 x 3.1 x 2 cm. The left ovarian venous flow is within normal limits.      Impression:  1. No adnexal masses are seen.  2. A complex hypoechoic avascular structure is seen within the cervix which measures 3.6 x 3.7 x 2.6 cm. This may reflect blood clot.  3. Details as above.                                     Medical decision making:       ED Course as of 05/07/22 1515   Thu May 05, 2022   1850 Labs reviewed thus far. Pelvic exam and ultrasound pending. Patient  will be signed out to Dr. Murry.  [SA]   Fri May 06, 2022   0016 Called to discuss with Dr. Campo with OBGYN - will schedule in clinic.  Patient's information sent to Dr. Campo.  ER precuations for any acute worsening. [MW]      ED Course User Index  [MW] Romulo Scott MD  [SA] AMAURY Reza          EKG:       Procedures       Diagnosis:  Final diagnoses:  [N93.9] Vaginal bleeding  [N93.9] Abnormal uterine bleeding (AUB) (Primary)           (Please note that this chart was completed via voice to text dictation. There may be typographical errors or substitutions that are unintentional, or uncorrected. Every attempt was made to proofread the chart prior to completion. If there are any questions, please contact the physician for final clarification).       AMAURY Reza  05/05/22 0119       AMAURY Reza  05/07/22 4684

## 2022-05-06 VITALS
BODY MASS INDEX: 34.97 KG/M2 | HEART RATE: 84 BPM | HEIGHT: 64 IN | DIASTOLIC BLOOD PRESSURE: 86 MMHG | SYSTOLIC BLOOD PRESSURE: 136 MMHG | OXYGEN SATURATION: 97 % | RESPIRATION RATE: 18 BRPM | TEMPERATURE: 98 F | WEIGHT: 204.81 LBS

## 2022-05-06 RX ORDER — WARFARIN 6 MG/1
6 TABLET ORAL
COMMUNITY
Start: 2022-04-12 | End: 2022-08-01 | Stop reason: SDUPTHER

## 2022-05-06 RX ORDER — TRIAMTERENE AND HYDROCHLOROTHIAZIDE 37.5; 25 MG/1; MG/1
CAPSULE ORAL
COMMUNITY
Start: 2021-06-30 | End: 2022-06-01 | Stop reason: SDUPTHER

## 2022-05-06 NOTE — ED PROVIDER NOTES
Encounter Date: 5/5/2022       History     Chief Complaint   Patient presents with    Vaginal Bleeding     Excessive Vaginal Bleeding (used 28 tampons plus pads in 24 hours).  States on Coumadin and Hx anemia.       HPI  Review of patient's allergies indicates:   Allergen Reactions    Lamotrigine Swelling    Ondansetron Rash     Past Medical History:   Diagnosis Date    Anticoagulant long-term use     Diabetes mellitus     Hypertension     Iron deficiency anemia     Paroxysmal atrial fibrillation      Past Surgical History:   Procedure Laterality Date    AORTIC VALVE REPLACEMENT N/A      History reviewed. No pertinent family history.  Social History     Tobacco Use    Smoking status: Never Smoker    Smokeless tobacco: Never Used   Substance Use Topics    Alcohol use: Not Currently    Drug use: Yes     Types: Marijuana     Review of Systems    Physical Exam     Initial Vitals [05/05/22 1605]   BP Pulse Resp Temp SpO2   (!) 129/90 102 17 98.4 °F (36.9 °C) 99 %      MAP       --         Physical Exam    ED Course   Procedures  Labs Reviewed   PROTIME-INR - Abnormal; Notable for the following components:       Result Value    PT 28.8 (*)     INR 2.81 (*)     All other components within normal limits   APTT - Abnormal; Notable for the following components:    PTT 45.2 (*)     All other components within normal limits   COMPREHENSIVE METABOLIC PANEL - Abnormal; Notable for the following components:    Glucose Level 135 (*)     All other components within normal limits   CBC WITH DIFFERENTIAL - Abnormal; Notable for the following components:    MCH 26.7 (*)     MCHC 31.9 (*)     IG# 0.05 (*)     IG% 0.5 (*)     All other components within normal limits   CBC W/ AUTO DIFFERENTIAL    Narrative:     The following orders were created for panel order CBC auto differential.  Procedure                               Abnormality         Status                     ---------                               -----------          ------                     CBC with Differential[[id number]]        Abnormal            Final result                 Please view results for these tests on the individual orders.   URINALYSIS   EXTRA TUBES    Narrative:     The following orders were created for panel order EXTRA TUBES.  Procedure                               Abnormality         Status                     ---------                               -----------         ------                     Lavender Top Hold[[id number]]                                In process                 Gold Top Hold[[id number]]                                    In process                 Blood Bank Hold[[id number]]                                                             Pink Top Hold[[id number]]                                    In process                   Please view results for these tests on the individual orders.   LAVENDER TOP HOLD   GOLD TOP HOLD   PINK TOP HOLD   POCT URINE PREGNANCY   POCT GLUCOSE, HAND-HELD DEVICE   TYPE & SCREEN   BLOOD BANK HOLD   ABORH RETYPE          Imaging Results          US Pelvis Comp with Transvag NON-OB (xpd (Preliminary result)  Result time 05/05/22 22:33:51    Preliminary result by Ki Catherine Jr., MD (05/05/22 22:33:51)                 Narrative:    START OF REPORT:  Technique: Transabdominal and transvaginal ultrasound of the pelvis was performed.    Comparison: None.    CLINICAL HISTORY: Vag Bleeding.    Findings:  Uterus: The uterus measures 11 x 6 x 6 cm. The endometrium measures 6 mm in thickness. A complex hypoechoic avascular structure is seen within the cervix which measures 3.6 x 3.7 x 2.6 cm. This may reflect blood clot. There is a 1.3 x 1.3 x 1 cm Nabothian cyst in the cervix. There is mild fluid within the endometrial canal.  Adnexa: No adnexal masses are seen. Bilateral arterial blood flow within the ovaries could not be obtained due to bowel gas. In addition, there is suboptimal visualization of left ovary  due to bowel gas.  Right Ovary: The right ovary measures 3 x 3.8 x 2 cm. The right ovarian venous flow is within normal limits.  Left Ovary: The left ovary measures 3.5 x 3.1 x 2 cm. The left ovarian venous flow is within normal limits.      Impression:  1. No adnexal masses are seen.  2. A complex hypoechoic avascular structure is seen within the cervix which measures 3.6 x 3.7 x 2.6 cm. This may reflect blood clot.  3. Details as above.                                   Medications   HYDROcodone-acetaminophen  mg per tablet 1 tablet (1 tablet Oral Given 5/5/22 2048)                 ED Course as of 05/06/22 0024   Thu May 05, 2022   1850 Labs reviewed thus far. Pelvic exam and ultrasound pending. Patient will be signed out to Dr. Murry.  [SA]   Fri May 06, 2022   0016 Called to discuss with Dr. Campo with OBGYN - will schedule in clinic.  Patient's information sent to Dr. Campo.  ER precuations for any acute worsening. [MW]      ED Course User Index  [MW] Romulo Scott MD  [SA] AMAURY Reza             Clinical Impression:   Final diagnoses:  [N93.9] Vaginal bleeding  [N93.9] Abnormal uterine bleeding (AUB) (Primary)                 Romulo Scott MD  05/06/22 0027

## 2022-05-06 NOTE — ED PROVIDER NOTES
Patient was signed out to me pending labs and re-evaluation.    Patient that she has been having vaginal bleeding that has been going on for approximately 3 months.  Every 2 weeks she will have large episodes of bleeding which will go through 20+ pads per day no loss for approximately 3 days similar to episodes of periods.  She is currently on warfarin secondary to heart valve replacement.  She started to have a recurrent episode and was told to come into the emergency department for evaluation.  Patient denies chest pain, shortness of breath, syncopal episodes.    Performed a physical exam with the patient.  Abdomen was soft without tenderness to palpation.  On  exam cervical OS was open and there was small signs of blood.  Discussed with ultrasound technician facet there was large amounts of blood when she was doing the exam.  Final read is still pending.    Attempted to call Gynecology resident x2 without success.  Patient will be signed out to Dr. Scott for U/S read and re-evaluation.     Merrick Murry MD  05/05/22 0220

## 2022-05-31 DIAGNOSIS — E03.9 HYPOTHYROIDISM, UNSPECIFIED TYPE: ICD-10-CM

## 2022-05-31 DIAGNOSIS — R73.01 IMPAIRED FASTING GLUCOSE: ICD-10-CM

## 2022-05-31 DIAGNOSIS — I10 PRIMARY HYPERTENSION: Primary | ICD-10-CM

## 2022-06-01 ENCOUNTER — OFFICE VISIT (OUTPATIENT)
Dept: CARDIOLOGY | Facility: CLINIC | Age: 47
End: 2022-06-01
Payer: MEDICAID

## 2022-06-01 ENCOUNTER — LAB VISIT (OUTPATIENT)
Dept: LAB | Facility: HOSPITAL | Age: 47
End: 2022-06-01
Attending: NURSE PRACTITIONER
Payer: MEDICAID

## 2022-06-01 ENCOUNTER — CLINICAL SUPPORT (OUTPATIENT)
Dept: INTERNAL MEDICINE | Facility: CLINIC | Age: 47
End: 2022-06-01
Payer: MEDICAID

## 2022-06-01 ENCOUNTER — OFFICE VISIT (OUTPATIENT)
Dept: GYNECOLOGY | Facility: CLINIC | Age: 47
End: 2022-06-01
Payer: MEDICAID

## 2022-06-01 VITALS
BODY MASS INDEX: 35.23 KG/M2 | WEIGHT: 206.38 LBS | HEART RATE: 65 BPM | TEMPERATURE: 98 F | DIASTOLIC BLOOD PRESSURE: 73 MMHG | OXYGEN SATURATION: 96 % | SYSTOLIC BLOOD PRESSURE: 124 MMHG | HEIGHT: 64 IN

## 2022-06-01 VITALS
HEART RATE: 64 BPM | BODY MASS INDEX: 35.34 KG/M2 | HEIGHT: 64 IN | WEIGHT: 207 LBS | RESPIRATION RATE: 16 BRPM | TEMPERATURE: 98 F | SYSTOLIC BLOOD PRESSURE: 111 MMHG | DIASTOLIC BLOOD PRESSURE: 74 MMHG | OXYGEN SATURATION: 97 %

## 2022-06-01 DIAGNOSIS — Z12.31 SCREENING MAMMOGRAM FOR BREAST CANCER: ICD-10-CM

## 2022-06-01 DIAGNOSIS — Z80.3 FAMILY HX-BREAST MALIGNANCY: ICD-10-CM

## 2022-06-01 DIAGNOSIS — E66.9 OBESITY (BMI 35.0-39.9 WITHOUT COMORBIDITY): ICD-10-CM

## 2022-06-01 DIAGNOSIS — E11.9 DIABETES MELLITUS WITHOUT COMPLICATION: ICD-10-CM

## 2022-06-01 DIAGNOSIS — Z95.2 HISTORY OF AORTIC VALVE REPLACEMENT: Primary | ICD-10-CM

## 2022-06-01 DIAGNOSIS — Z95.2 H/O MECHANICAL AORTIC VALVE REPLACEMENT: Primary | ICD-10-CM

## 2022-06-01 DIAGNOSIS — E78.5 HYPERLIPIDEMIA, UNSPECIFIED HYPERLIPIDEMIA TYPE: ICD-10-CM

## 2022-06-01 DIAGNOSIS — I48.0 PAROXYSMAL ATRIAL FIBRILLATION: ICD-10-CM

## 2022-06-01 DIAGNOSIS — I10 PRIMARY HYPERTENSION: ICD-10-CM

## 2022-06-01 DIAGNOSIS — R73.01 IMPAIRED FASTING GLUCOSE: ICD-10-CM

## 2022-06-01 DIAGNOSIS — E03.9 HYPOTHYROIDISM, UNSPECIFIED TYPE: ICD-10-CM

## 2022-06-01 DIAGNOSIS — Z01.419 WOMEN'S ANNUAL ROUTINE GYNECOLOGICAL EXAMINATION: Primary | ICD-10-CM

## 2022-06-01 DIAGNOSIS — Z86.79 HISTORY OF ENDOCARDITIS: ICD-10-CM

## 2022-06-01 LAB
CHOLEST SERPL-MCNC: 154 MG/DL
CHOLEST/HDLC SERPL: 3 {RATIO} (ref 0–5)
EST. AVERAGE GLUCOSE BLD GHB EST-MCNC: 148.5 MG/DL
HBA1C MFR BLD: 6.8 %
HDLC SERPL-MCNC: 51 MG/DL (ref 35–60)
LDLC SERPL CALC-MCNC: 79 MG/DL (ref 50–140)
TRIGL SERPL-MCNC: 119 MG/DL (ref 37–140)
TSH SERPL-ACNC: 1.31 UIU/ML (ref 0.35–4.94)
VLDLC SERPL CALC-MCNC: 24 MG/DL

## 2022-06-01 PROCEDURE — 3078F PR MOST RECENT DIASTOLIC BLOOD PRESSURE < 80 MM HG: ICD-10-PCS | Mod: CPTII,,, | Performed by: NURSE PRACTITIONER

## 2022-06-01 PROCEDURE — 3008F BODY MASS INDEX DOCD: CPT | Mod: CPTII,,, | Performed by: NURSE PRACTITIONER

## 2022-06-01 PROCEDURE — 3074F SYST BP LT 130 MM HG: CPT | Mod: CPTII,,, | Performed by: NURSE PRACTITIONER

## 2022-06-01 PROCEDURE — 1159F PR MEDICATION LIST DOCUMENTED IN MEDICAL RECORD: ICD-10-PCS | Mod: CPTII,,, | Performed by: NURSE PRACTITIONER

## 2022-06-01 PROCEDURE — 3078F DIAST BP <80 MM HG: CPT | Mod: CPTII,,, | Performed by: NURSE PRACTITIONER

## 2022-06-01 PROCEDURE — 1160F PR REVIEW ALL MEDS BY PRESCRIBER/CLIN PHARMACIST DOCUMENTED: ICD-10-PCS | Mod: CPTII,,, | Performed by: NURSE PRACTITIONER

## 2022-06-01 PROCEDURE — 99211 OFF/OP EST MAY X REQ PHY/QHP: CPT | Mod: PBBFAC,27

## 2022-06-01 PROCEDURE — 1159F MED LIST DOCD IN RCRD: CPT | Mod: CPTII,,, | Performed by: NURSE PRACTITIONER

## 2022-06-01 PROCEDURE — 99386 PR PREVENTIVE VISIT,NEW,40-64: ICD-10-PCS | Mod: S$PBB,,, | Performed by: NURSE PRACTITIONER

## 2022-06-01 PROCEDURE — 1160F RVW MEDS BY RX/DR IN RCRD: CPT | Mod: CPTII,,, | Performed by: NURSE PRACTITIONER

## 2022-06-01 PROCEDURE — 36415 COLL VENOUS BLD VENIPUNCTURE: CPT

## 2022-06-01 PROCEDURE — 99215 OFFICE O/P EST HI 40 MIN: CPT | Mod: PBBFAC | Performed by: NURSE PRACTITIONER

## 2022-06-01 PROCEDURE — 83036 HEMOGLOBIN GLYCOSYLATED A1C: CPT

## 2022-06-01 PROCEDURE — 99213 OFFICE O/P EST LOW 20 MIN: CPT | Mod: PBBFAC,27 | Performed by: INTERNAL MEDICINE

## 2022-06-01 PROCEDURE — 84443 ASSAY THYROID STIM HORMONE: CPT

## 2022-06-01 PROCEDURE — 99386 PREV VISIT NEW AGE 40-64: CPT | Mod: S$PBB,,, | Performed by: NURSE PRACTITIONER

## 2022-06-01 PROCEDURE — 3008F PR BODY MASS INDEX (BMI) DOCUMENTED: ICD-10-PCS | Mod: CPTII,,, | Performed by: NURSE PRACTITIONER

## 2022-06-01 PROCEDURE — 3074F PR MOST RECENT SYSTOLIC BLOOD PRESSURE < 130 MM HG: ICD-10-PCS | Mod: CPTII,,, | Performed by: NURSE PRACTITIONER

## 2022-06-01 PROCEDURE — 80061 LIPID PANEL: CPT

## 2022-06-01 RX ORDER — CYCLOBENZAPRINE HCL 10 MG
10 TABLET ORAL 3 TIMES DAILY
COMMUNITY
Start: 2022-05-23 | End: 2022-08-01 | Stop reason: SDUPTHER

## 2022-06-01 RX ORDER — TRAZODONE HYDROCHLORIDE 100 MG/1
100 TABLET ORAL NIGHTLY
COMMUNITY
Start: 2022-04-12 | End: 2022-08-01 | Stop reason: SDUPTHER

## 2022-06-01 RX ORDER — ASPIRIN 81 MG/1
81 TABLET ORAL DAILY
COMMUNITY
Start: 2022-01-12

## 2022-06-01 RX ORDER — HYDROXYZINE HYDROCHLORIDE 25 MG/1
25 TABLET, FILM COATED ORAL
COMMUNITY
Start: 2021-12-01 | End: 2022-08-01

## 2022-06-01 RX ORDER — METFORMIN HYDROCHLORIDE 1000 MG/1
1000 TABLET ORAL 2 TIMES DAILY
COMMUNITY
Start: 2022-03-21 | End: 2022-08-01 | Stop reason: SDUPTHER

## 2022-06-01 RX ORDER — BUSPIRONE HYDROCHLORIDE 15 MG/1
15 TABLET ORAL 3 TIMES DAILY
COMMUNITY
Start: 2022-03-17 | End: 2022-08-01 | Stop reason: SDUPTHER

## 2022-06-01 RX ORDER — FLUOXETINE HYDROCHLORIDE 40 MG/1
40 CAPSULE ORAL
COMMUNITY
Start: 2021-12-01 | End: 2022-08-01

## 2022-06-01 RX ORDER — FERROUS GLUCONATE 225(27)MG
27 TABLET ORAL 3 TIMES DAILY
COMMUNITY
End: 2022-08-01

## 2022-06-01 RX ORDER — METOPROLOL TARTRATE 25 MG/1
25 TABLET, FILM COATED ORAL 2 TIMES DAILY
COMMUNITY
Start: 2022-05-23 | End: 2022-08-05 | Stop reason: SDUPTHER

## 2022-06-01 RX ORDER — TRIAMTERENE AND HYDROCHLOROTHIAZIDE 37.5; 25 MG/1; MG/1
1 CAPSULE ORAL EVERY MORNING
COMMUNITY
End: 2022-08-05 | Stop reason: SDUPTHER

## 2022-06-01 RX ORDER — FLUOXETINE 10 MG/1
10 TABLET ORAL
COMMUNITY
End: 2022-08-01 | Stop reason: SDUPTHER

## 2022-06-01 RX ORDER — ATORVASTATIN CALCIUM 20 MG/1
20 TABLET, FILM COATED ORAL DAILY
COMMUNITY
Start: 2022-03-21 | End: 2022-08-01 | Stop reason: SDUPTHER

## 2022-06-01 RX ORDER — LEVOTHYROXINE SODIUM 150 UG/1
150 TABLET ORAL DAILY
COMMUNITY
Start: 2022-04-01 | End: 2022-08-01 | Stop reason: SDUPTHER

## 2022-06-01 NOTE — PROGRESS NOTES
Poc Pt/Inr results 2.6/30.8 (Normal range 2.5-3.5) Inr level within normal range. Takes Warfarin 6 mg. Patient weight 206 pounds. Return to clinic 6/29/22.

## 2022-06-01 NOTE — PATIENT INSTRUCTIONS
Assessment/Plan   Mechanical Aortic Valve Replacement 2/2 failure of bioprosthetic aortic valve  Mechanical aortic valve s/p replacement in Oct 2018  EF 60% per Echo June 2021 - normal mechanical aortic valve function, mild AR   Continue INR checks at Coumadin clinic    Atrial Fibrillation 2/2 post-op mechanical valve replacement  No recent episodes  Echo June 2021 - EF 60%   Continue Metoprolol Tartrate 25mg BID   Continue Coumadin monitoring    DM  Last A1C - 8.1  In 5/2021   Will have repeat blood work today  Defer management to PCP    Obesity  Patient has lost 35 pounds in the past and has maintained it. Since then reports her breathing has significantly improved. Congratulated her and encouraged her to keep it up    HLD  Lipid panel improved at 91 in 11/20211 still not at goal for her history of diabetes  Pt agreed to start lipitor 20mg, has been compliant  No recent FLP, will repeat today      RTC in 6 months

## 2022-06-01 NOTE — PROGRESS NOTES
"Patient ID: Zahira العراقي is a 46 y.o. female.    Chief Complaint: Well Woman      Review of patient's allergies indicates:   Allergen Reactions    Lamotrigine Swelling    Ondansetron Rash    Ondansetron hcl (pf) Rash         Past Medical History:   Diagnosis Date    Anticoagulant long-term use     Diabetes mellitus     Hypertension     Iron deficiency anemia     Paroxysmal atrial fibrillation           HPI:  The patient  here for annual gyn exam. Reports hx of abnormal pap several years ago that required biopsy but no treatment. States last pap was a few years ago with outside provider. Her LMP was . Pt states over the last 6 months she is having a period every 2 weeks with cramping. Denies pain outside of period. Pt was apparently seen in ER for this and has an appt with gyn resident team later this month. She had TL for contraception. Pt has hx of HTN, DM, Afib. Pt also has hx of AVR and is currently on coumadin. Denies any dosage changes in the past year. Fly hx includes maternal and paternal GM with hx of breast cancer. Denies concerns for STIs. Pt is .      Review of Systems:   Negative except for findings in HPI     Objective:   /74   Pulse 64   Temp 97.7 °F (36.5 °C)   Resp 16   Ht 5' 4" (1.626 m)   Wt 93.9 kg (207 lb)   LMP 2022   SpO2 97%   BMI 35.53 kg/m²    Physical Exam:  GENERAL: Pt is aware and alert and  in no acute distress.  BREASTS: Bilateral-No masses, nipple discharge, skin changes, tenderness.  ABDOMEN: Soft, non tender.  VULVA: General appearance normal; external genitalia with no lesions or erythema.  URETHRA: No lesions  BLADDER: No tenderness.  VAGINA: Mucosa normal,no discharge or lesions.  CERVIX:  no CMT, NO discharge; NO lesions  BIMANUAL EXAM: reveals an 8week-sized retroverted uterus. The uterus is regular, mobile, and non tender; its consistency is normal. Luis adnexa reveal no evidence of masses; no fullness   SKIN: Normal to " inspection with no rashes, lesions, or ulcers.  PSYCHIATRIC: Patient is awake and alert. Mood and affect are normal.    Assessment:   Women's annual routine gynecological examination  -     Liquid-Based Pap Smear, Screening Screening    Screening mammogram for breast cancer  -     Mammo Digital Screening Bilat w/ [place]; Future; Expected date: 06/30/2022    Family hx-breast malignancy  -     Ambulatory referral/consult to Hematology / Oncology; Future; Expected date: 06/30/2022            1. Women's annual routine gynecological examination    2. Screening mammogram for breast cancer    3. Family hx-breast malignancy           -pap/hpv  -declined hiv/hep/rpr screening  -keep appt with gyn resident clinic this month  Plan:       Follow up in about 1 year (around 6/1/2023).

## 2022-06-01 NOTE — PROGRESS NOTES
Chief Complaint   Patient presents with    Here today for 6 month follow up     Dizziness         46 year-old  female with a past medical history of bioprosthetic aortic valve replacement in 2012 2/2 endocarditis with revisional mechanical aortic valve replacement in October 2018 presents for 4 month follow up and results of Echocardiogram. She completed an Echocardiogram on June 4, 2021 which revealed an ejection fraction of approximately 60%, normally functioning mechanical prosthetic aortic valve, mild AR, and mild TR. Of note, after her valve replacement surgery in 2018, she developed A Fib and remains on Coumadin.     Today, she reports feeling well with only occasional dizziness that she thinks is due to significant menorrhagia. She is getting evaluated by women's clinic and will be seen by OBGYN in a few weeks. She otherwise denies SOB, chest pain on exertion and has been more active. 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 No other symptoms. Complaint with meds.     Cardiac testing  Echocardiogram June 4, 2021:  Left ventricular ejection fraction is measured at approximately 60%.  Structurally normal mitral valve.  Trace mitral regurgitation.  Normally functioning mechanical prosthetic valve in aortic position.  Mild aortic regurgitation present.  Peak velocity across the aortic valve is 2.6m/sec. Peak gradient is 27.27 mmHg. Mean gradient is 13.54mmHg.  Tricuspid valve is structurally normal.  There is mild tricuspid regurgitation with RVSP estimated 49 mmHg.  Mitral valve is structurally normal.  Moderately dilated left atrium.  Mildly dilated right atrium.  Right ventricle global systolic function is mildly reduced.  IVC normal.    Echocardiogram March 2020:  EF 55 to 65%. Normal left ventricle diastolic function.  Mild tricuspid regurgitation  Mild pulmonary hypertension present  Aortic valve: There is a mechanical valve present  Prosthetic aortic valve  function is normal.      TTE (Nov 2018):  Left ventricular ejection fraction is measured at approximately 65%.  Right ventricle global systolic function is mildly reduced.  Normal size right ventricle cavity.  Mechanical prosthetic valve present in the aortic position.  Aortic valve mean gradient of 12.85 mm Hg.      Review of Systems   Constitutional: negative for fever,chills, sweats, weakness, fatigue, decreased activity   Eye: negative for blurry vision, vision change  ENMT: negative for sore throat, nasal congestion  Respiratory: negative for shortness of breath, cough, wheezing  Cardiovascular: negative for chest pain, dyspnea on exertion, orthopnea, PND, lower extremity edema, palpitations, claudication  Gastrointestinal: negative for nausea, vomiting, abdominal pain, constipation, diarrhea, blood in stool  Genitourinary: negative for hematuria, nocturia, dysuria  Endocrine: negative for abnormal thirst, temperature  Musculoskeletal: negative for back pain, joint pain  Integumentary: negative for abnormal mole  Neurologic: negative for weakness, numbness, headaches, shooting pain  Hematology: negative for easy bruising, easy bleeding  Allergy: negative for watery eyes, sneezing  Psychiatric: negative for loss of interest, anxiety, stress      Physical Exam Vitals & Measurements     Vitals:    06/01/22 0925   BP: 124/73   Pulse: 65   Temp: 98.1 °F (36.7 °C)         General: alert and oriented/no acute distress  Eye: EOMI/normal conjunctiva/no xanthelasma  HENT: normocephalic/moist oral mucosa  Neck: supple/nontender/no carotid bruit  Respiratory: lungs CTA/nonlabored respirations/BS equal/symmetrical expansion/no chest wall tenderness  Cardiovascular: normal rate/normal rhythm/no murmur/normal peripheral perfusion/no edema/no JVD  Gastrointestinal: soft/nontender  Musculoskeletal: normal ROM  Integumentary: warm/dry/pink/intact  Neurologic: alert/oriented/normal sensory/no focal deficits  Psychiatric:  cooperative/appropriate mood and affect/normal judgment       Current Outpatient Medications:     aspirin (ECOTRIN) 81 MG EC tablet, Take 81 mg by mouth once daily., Disp: , Rfl:     atorvastatin (LIPITOR) 20 MG tablet, Take 20 mg by mouth once daily., Disp: , Rfl:     busPIRone (BUSPAR) 15 MG tablet, Take 15 mg by mouth 3 (three) times daily., Disp: , Rfl:     cyclobenzaprine (FLEXERIL) 10 MG tablet, Take 10 mg by mouth 3 (three) times daily., Disp: , Rfl:     FLUoxetine 40 MG capsule, Take 40 mg by mouth., Disp: , Rfl:     hydrOXYzine HCL (ATARAX) 25 MG tablet, Take 25 mg by mouth., Disp: , Rfl:     levothyroxine (SYNTHROID) 150 MCG tablet, Take 150 mcg by mouth once daily., Disp: , Rfl:     metFORMIN (GLUCOPHAGE) 1000 MG tablet, Take 1,000 mg by mouth 2 (two) times daily., Disp: , Rfl:     metoprolol tartrate (LOPRESSOR) 25 MG tablet, Take 25 mg by mouth 2 (two) times daily., Disp: , Rfl:     traZODone (DESYREL) 100 MG tablet, Take 100 mg by mouth nightly., Disp: , Rfl:     triamterene-hydrochlorothiazide 37.5-25 mg (DYAZIDE) 37.5-25 mg per capsule, Take 1 capsule by mouth every morning., Disp: , Rfl:     warfarin (COUMADIN) 6 MG tablet, Take 6 mg by mouth., Disp: , Rfl:     ferrous gluconate (FERGON) 225 mg (27 mg iron) Tab, Take 27 mg by mouth 3 (three) times daily., Disp: , Rfl:     FLUoxetine 10 MG Tab, Take 10 mg by mouth., Disp: , Rfl:     triamterene-hydrochlorothiazide 37.5-25 mg (DYAZIDE) 37.5-25 mg per capsule, Take by mouth., Disp: , Rfl:       Assessment/Plan   Mechanical Aortic Valve Replacement 2/2 failure of bioprosthetic aortic valve  Mechanical aortic valve s/p replacement in Oct 2018  EF 60% per Echo June 2021 - normal mechanical aortic valve function, mild AR   Continue INR checks at Coumadin clinic    Atrial Fibrillation 2/2 post-op mechanical valve replacement  No recent episodes  Echo June 2021 - EF 60%   Continue Metoprolol Tartrate 25mg BID   Continue Coumadin  monitoring    DM  Last A1C - 8.1  In 5/2021   Will have repeat blood work today  Defer management to PCP    Obesity  Patient has lost 35 pounds in the past and has maintained it. Since then reports her breathing has significantly improved. Congratulated her and encouraged her to keep it up    HLD  Lipid panel improved at 91 in 11/20211 still not at goal for her history of diabetes  Pt agreed to start lipitor 20mg, has been compliant  No recent FLP, will repeat today      RTC in 6 months

## 2022-08-01 ENCOUNTER — OFFICE VISIT (OUTPATIENT)
Dept: INTERNAL MEDICINE | Facility: CLINIC | Age: 47
End: 2022-08-01
Payer: MEDICAID

## 2022-08-01 VITALS
SYSTOLIC BLOOD PRESSURE: 132 MMHG | HEIGHT: 60 IN | RESPIRATION RATE: 18 BRPM | BODY MASS INDEX: 40.25 KG/M2 | HEART RATE: 75 BPM | TEMPERATURE: 99 F | DIASTOLIC BLOOD PRESSURE: 84 MMHG | WEIGHT: 205 LBS

## 2022-08-01 DIAGNOSIS — E66.01 CLASS 3 SEVERE OBESITY DUE TO EXCESS CALORIES WITH SERIOUS COMORBIDITY AND BODY MASS INDEX (BMI) OF 40.0 TO 44.9 IN ADULT: Chronic | ICD-10-CM

## 2022-08-01 DIAGNOSIS — F41.9 ANXIETY: Chronic | ICD-10-CM

## 2022-08-01 DIAGNOSIS — I48.0 PAROXYSMAL ATRIAL FIBRILLATION: Chronic | ICD-10-CM

## 2022-08-01 DIAGNOSIS — M79.641 BILATERAL HAND PAIN: Primary | ICD-10-CM

## 2022-08-01 DIAGNOSIS — M79.642 BILATERAL HAND PAIN: Primary | ICD-10-CM

## 2022-08-01 DIAGNOSIS — E11.9 TYPE 2 DIABETES MELLITUS WITHOUT COMPLICATION, WITHOUT LONG-TERM CURRENT USE OF INSULIN: Chronic | ICD-10-CM

## 2022-08-01 DIAGNOSIS — E03.9 HYPOTHYROIDISM, UNSPECIFIED TYPE: Chronic | ICD-10-CM

## 2022-08-01 DIAGNOSIS — I10 PRIMARY HYPERTENSION: Chronic | ICD-10-CM

## 2022-08-01 DIAGNOSIS — E78.49 OTHER HYPERLIPIDEMIA: Chronic | ICD-10-CM

## 2022-08-01 DIAGNOSIS — Z95.2 H/O MECHANICAL AORTIC VALVE REPLACEMENT: Chronic | ICD-10-CM

## 2022-08-01 PROBLEM — I35.2 AORTIC VALVE STENOSIS WITH INSUFFICIENCY: Status: ACTIVE | Noted: 2022-08-01

## 2022-08-01 PROBLEM — E66.813 CLASS 3 SEVERE OBESITY DUE TO EXCESS CALORIES WITH SERIOUS COMORBIDITY AND BODY MASS INDEX (BMI) OF 40.0 TO 44.9 IN ADULT: Chronic | Status: ACTIVE | Noted: 2022-06-01

## 2022-08-01 PROBLEM — E78.5 HYPERLIPIDEMIA: Chronic | Status: ACTIVE | Noted: 2022-06-01

## 2022-08-01 PROBLEM — I34.1 MITRAL VALVE PROLAPSE: Status: ACTIVE | Noted: 2022-08-01

## 2022-08-01 PROBLEM — E66.9 OBESITY: Chronic | Status: ACTIVE | Noted: 2022-06-01

## 2022-08-01 PROCEDURE — 99214 OFFICE O/P EST MOD 30 MIN: CPT | Mod: S$PBB,,, | Performed by: NURSE PRACTITIONER

## 2022-08-01 PROCEDURE — 1160F PR REVIEW ALL MEDS BY PRESCRIBER/CLIN PHARMACIST DOCUMENTED: ICD-10-PCS | Mod: CPTII,,, | Performed by: NURSE PRACTITIONER

## 2022-08-01 PROCEDURE — 3075F SYST BP GE 130 - 139MM HG: CPT | Mod: CPTII,,, | Performed by: NURSE PRACTITIONER

## 2022-08-01 PROCEDURE — 3008F BODY MASS INDEX DOCD: CPT | Mod: CPTII,,, | Performed by: NURSE PRACTITIONER

## 2022-08-01 PROCEDURE — 3075F PR MOST RECENT SYSTOLIC BLOOD PRESS GE 130-139MM HG: ICD-10-PCS | Mod: CPTII,,, | Performed by: NURSE PRACTITIONER

## 2022-08-01 PROCEDURE — 3079F DIAST BP 80-89 MM HG: CPT | Mod: CPTII,,, | Performed by: NURSE PRACTITIONER

## 2022-08-01 PROCEDURE — 99215 OFFICE O/P EST HI 40 MIN: CPT | Mod: PBBFAC | Performed by: NURSE PRACTITIONER

## 2022-08-01 PROCEDURE — 1159F PR MEDICATION LIST DOCUMENTED IN MEDICAL RECORD: ICD-10-PCS | Mod: CPTII,,, | Performed by: NURSE PRACTITIONER

## 2022-08-01 PROCEDURE — 99214 PR OFFICE/OUTPT VISIT, EST, LEVL IV, 30-39 MIN: ICD-10-PCS | Mod: S$PBB,,, | Performed by: NURSE PRACTITIONER

## 2022-08-01 PROCEDURE — 1159F MED LIST DOCD IN RCRD: CPT | Mod: CPTII,,, | Performed by: NURSE PRACTITIONER

## 2022-08-01 PROCEDURE — 3008F PR BODY MASS INDEX (BMI) DOCUMENTED: ICD-10-PCS | Mod: CPTII,,, | Performed by: NURSE PRACTITIONER

## 2022-08-01 PROCEDURE — 3079F PR MOST RECENT DIASTOLIC BLOOD PRESSURE 80-89 MM HG: ICD-10-PCS | Mod: CPTII,,, | Performed by: NURSE PRACTITIONER

## 2022-08-01 PROCEDURE — 1160F RVW MEDS BY RX/DR IN RCRD: CPT | Mod: CPTII,,, | Performed by: NURSE PRACTITIONER

## 2022-08-01 RX ORDER — METFORMIN HYDROCHLORIDE 1000 MG/1
1000 TABLET ORAL 2 TIMES DAILY
Qty: 180 TABLET | Refills: 2 | Status: SHIPPED | OUTPATIENT
Start: 2022-08-01 | End: 2022-12-01 | Stop reason: SDUPTHER

## 2022-08-01 RX ORDER — BUSPIRONE HYDROCHLORIDE 15 MG/1
15 TABLET ORAL 2 TIMES DAILY
Qty: 180 TABLET | Refills: 2 | Status: SHIPPED | OUTPATIENT
Start: 2022-08-01 | End: 2022-12-01 | Stop reason: SDUPTHER

## 2022-08-01 RX ORDER — FERROUS SULFATE 325(65) MG
325 TABLET ORAL 3 TIMES DAILY
COMMUNITY

## 2022-08-01 RX ORDER — LEVOTHYROXINE SODIUM 150 UG/1
150 TABLET ORAL DAILY
Qty: 90 TABLET | Refills: 2 | Status: SHIPPED | OUTPATIENT
Start: 2022-08-01 | End: 2022-12-01 | Stop reason: SDUPTHER

## 2022-08-01 RX ORDER — FLUTICASONE PROPIONATE 50 MCG
1 SPRAY, SUSPENSION (ML) NASAL DAILY
Qty: 15.8 ML | Refills: 2 | Status: SHIPPED | OUTPATIENT
Start: 2022-08-01 | End: 2022-12-09 | Stop reason: SDUPTHER

## 2022-08-01 RX ORDER — ATORVASTATIN CALCIUM 20 MG/1
20 TABLET, FILM COATED ORAL DAILY
Qty: 90 TABLET | Refills: 2 | Status: SHIPPED | OUTPATIENT
Start: 2022-08-01 | End: 2022-12-01 | Stop reason: SDUPTHER

## 2022-08-01 RX ORDER — FLUOXETINE 10 MG/1
10 TABLET ORAL DAILY
Qty: 90 TABLET | Refills: 2 | Status: SHIPPED | OUTPATIENT
Start: 2022-08-01 | End: 2022-10-26

## 2022-08-01 RX ORDER — TRAZODONE HYDROCHLORIDE 100 MG/1
100 TABLET ORAL NIGHTLY PRN
Qty: 30 TABLET | Refills: 2 | Status: SHIPPED | OUTPATIENT
Start: 2022-08-01 | End: 2022-12-08 | Stop reason: SDUPTHER

## 2022-08-01 RX ORDER — WARFARIN 6 MG/1
6 TABLET ORAL DAILY
Qty: 90 TABLET | Refills: 2 | Status: SHIPPED | OUTPATIENT
Start: 2022-08-01 | End: 2022-10-26 | Stop reason: SDUPTHER

## 2022-08-01 RX ORDER — CYCLOBENZAPRINE HCL 10 MG
10 TABLET ORAL 3 TIMES DAILY
Qty: 270 TABLET | Refills: 2 | Status: SHIPPED | OUTPATIENT
Start: 2022-08-01 | End: 2022-10-30

## 2022-08-01 RX ORDER — ALBUTEROL SULFATE 90 UG/1
2 AEROSOL, METERED RESPIRATORY (INHALATION) EVERY 6 HOURS PRN
Qty: 18 G | Refills: 2 | Status: SHIPPED | OUTPATIENT
Start: 2022-08-01 | End: 2022-12-08 | Stop reason: SDUPTHER

## 2022-08-01 NOTE — PROGRESS NOTES
Subjective:       Patient ID: Zahira العراقي is a 46 y.o. female.    Chief Complaint: Establish Care    Patient has diagnosis of HTN, HLD, DM2, Hypothyroidism, A-Fib, Bioprosthetic Aortic Valve Replacement (2012) with Revision Mechanical Aortic Valve Replacement (10/2018), Hx of Endocarditis. Patient seen today to establish care. Patient last seen in clinic on 11/30/2021 by Dr. Martin. Today, patient states she would like to restart her Prozac. States she was on 40 mg but stopped because she missed her appointments and was not able to obtain refills. Patient states the Buspar helps but not enough. Patient also stating insomnia. States taking Trazodone but it doesn't help. Patient also stating she has joint pain often, usually worse at night. States hands worse. Patient also states history of COVID about 2 weeks ago. Patient denies any other acute complaints.     Patient seen in ED at [facility] on 07/26/2022. Recent acute COVID infection presents for persistent chest tightness, dyspnea especially with exertion, fatigue headaches and subjective fevers, cough is nonproductive, acute COVID infection was approximately 2 weeks ago. Patient presents with classic signs symptoms of post COVID syndrome after recent COVID infection, lungs are clear oxygenation is appropriate, she is nontoxic-appearing, chest x-ray is clear, patient will be given symptomatic care course of Decadron, PCP follow-up and ED return precautions. Patient prescribed Tessalon, Decadron, Pulmicort, Phenergan and discharged home.     Patient seen in ED at [facility] on 06/25/2022. History of A. fib, diabetes, endocarditis, hypertension, depression presents to the ER with complaint of bilateral ear pain, cough, wheezing, sore throat x1 week. Patient states she was seen at a walk-in clinic a week ago for ear pain and diagnosed with otitis media, and completed a round of amoxicillin. Patient states that her symptoms have not improved, and that her ear pain has  worsened. Patient states that she woke up yesterday morning with discharge coming from her ears. She states she has a popping sensation in bilateral ears. She denies nausea, vomiting, diarrhea, abdominal pain, weakness, dizziness, nuchal rigidity, fever, chills. Verbalize no other complaints at this time. No acute distress or worsening symptoms while in ER. Work-up and treatment plan discussed with patient. Negative COVID, influenza, RSV. Chest x-ray negative for any pneumonia. Patient likely has bronchitis. Patient also has bilateral otitis media with perforated eardrum. Patient be placed on Augmentin, ofloxacin otic solution, DuoNeb, cough medication, pain medication as a for symptom management home. Instructed patient to take Tylenol and ibuprofen as needed for pain and fever, and to increase oral intake of water to ensure adequate hydration. Advised patient to follow-up with her PCP if symptoms fail to improve as she may need ENT referral. Instructed to return to the ER for any worsening of symptoms. Patient verbalized understanding and agreement treatment plan. Patient prescribed promethazine-DM, Augmentin, DuoNeb, Ofloxacin, [place] and discharged home.     Patient followed by [facility]. Last appointment on 06/01/2022. Pt/Inr results 2.6/30.8 (Normal range 2.5-3.5) Inr level within normal range. Takes Warfarin 6 mg. Patient weight 206 pounds. Return to clinic 6/29/22. Patient missed her appointment, states she will call to rescheduled.     Patient followed by Cardiology Clinic. Last appointment on 06/01/2022. Past medical history of bioprosthetic aortic valve replacement in 2012 S/T endocarditis with revisional mechanical aortic valve replacement in October 2018 presents for 4 month follow up and results of Echocardiogram. She completed an Echocardiogram on June 4, 2021 which revealed an EF of approximately 60%, normally functioning mechanical prosthetic aortic valve, mild AR, and mild TR. Of note,  after her valve replacement surgery in , she developed A Fib and remains on Coumadin. Today, she reports feeling well with only occasional dizziness that she thinks is due to significant menorrhagia. She is getting evaluated by women's clinic and will be seen by OBGYN in a few weeks. She otherwise denies SOB, chest pain on exertion and has been more active. 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 No other symptoms. Complaint with meds. Mechanical Aortic Valve Replacement S/T failure of bioprosthetic aortic valve: Mechanical aortic valve s/p replacement in Oct 2018; EF 60% per Echo 2021 - normal mechanical aortic valve function, mild AR; Continue INR checks at Coumadin clinic; Atrial Fibrillation S/T post-op mechanical valve replacement; No recent episodes; Echo 2021 - EF 60%; Continue Metoprolol Tartrate 25mg BID and Coumadin monitoring. HLD: Lipid panel improved at 91 in  still not at goal for her history of diabetes; Pt agreed to start lipitor 20mg, has been compliant; No recent FLP, will repeat today. Patient to follow up in 6 months.     Patient followed by GYN clinic. Last appointment on 2022.  here for annual gyn exam. Reports hx of abnormal pap several years ago that required biopsy but no treatment. States last pap was a few years ago with outside provider. Her LMP was . Pt states over the last 6 months she is having a period every 2 weeks with cramping. Denies pain outside of period. Pt was apparently seen in ER for this and has an appt with gyn resident team later this month. She had TL for contraception. Pt has hx of HTN, DM, Afib. Pt also has hx of AVR and is currently on coumadin. Denies any dosage changes in the past year. Fly hx includes maternal and paternal GM with hx of breast cancer. Denies concerns for STIs. Pt is . Patient has follow up appointment on 10/26/2022.       Mammogram: 2021, negative  Pap:  10/130/2017  FIT: deferred at this time  Diabetic Eye Exam: deferred at this time  Diabetic Foot Exam: 05/17/2021  Bone Density: deferred at this time  Medicare Wellness: N/A    Review of Systems   Constitutional: Negative.    HENT: Negative.    Eyes: Negative.    Respiratory: Negative.    Cardiovascular: Negative.    Gastrointestinal: Negative.    Endocrine: Negative.    Genitourinary: Negative.    Musculoskeletal: Positive for arthralgias and joint swelling.   Integumentary:  Negative.   Allergic/Immunologic: Negative.    Neurological: Negative.    Hematological: Negative.    Psychiatric/Behavioral: Positive for sleep disturbance. The patient is nervous/anxious.          Objective:      Physical Exam  Vitals reviewed.   Constitutional:       Appearance: Normal appearance.   HENT:      Head: Normocephalic and atraumatic.      Right Ear: Tympanic membrane, ear canal and external ear normal.      Left Ear: Tympanic membrane, ear canal and external ear normal.      Mouth/Throat:      Mouth: Mucous membranes are moist.      Pharynx: Oropharynx is clear.   Eyes:      Extraocular Movements: Extraocular movements intact.      Conjunctiva/sclera: Conjunctivae normal.      Pupils: Pupils are equal, round, and reactive to light.   Cardiovascular:      Rate and Rhythm: Normal rate and regular rhythm.      Heart sounds: Normal heart sounds.   Pulmonary:      Effort: Pulmonary effort is normal.      Breath sounds: Normal breath sounds.   Abdominal:      General: Bowel sounds are normal.   Musculoskeletal:         General: Normal range of motion.      Cervical back: Normal range of motion.   Skin:     General: Skin is warm and dry.   Neurological:      Mental Status: She is alert and oriented to person, place, and time.   Psychiatric:         Mood and Affect: Mood normal.         Behavior: Behavior normal.         Assessment:       Problem List Items Addressed This Visit        Psychiatric    Anxiety (Chronic)     Psychiatry  referral ordered  Start Prozac 10 mg daily  Continue Buspirone 15 mg bid           Relevant Medications    busPIRone (BUSPAR) 15 MG tablet    FLUoxetine 10 MG Tab    Other Relevant Orders    Ambulatory referral/consult to Psychiatry       Cardiac/Vascular    H/O mechanical aortic valve replacement (Chronic)     Followed by Cardiology  Continue Coumadin 6 mg daily           Paroxysmal atrial fibrillation (Chronic)     Followed by Cardiology  Continue Metoprolol Tartrate 25 mg bid           Hyperlipidemia (Chronic)     Continue Atorvastatin 20 mg daily  Weight loss encouraged  Low fat/high fiber diet  Increase physical activity  Chol: 154  HDL: 51  Tri  LDL: 79           Relevant Orders    Lipid Panel    Primary hypertension (Chronic)     B/P: 132/84  EK2020  Continue Triamterene-HCTZ 37.5-25 mg daily  DASH diet  Encouraged home blood pressure monitoring              Endocrine    Type 2 diabetes mellitus without complication, without long-term current use of insulin (Chronic)     Continue Metformin 1,000 mg bid  Continue home CBG monitoring.  Hypoglycemic episodes: denies  BMI: 40.04  HgbA1c: 6.8  UA Creatinine: ordered  UA Microalbumin: ordered  On Atorvastatin  Weight Loss Encouraged  ADA Diet           Relevant Medications    metFORMIN (GLUCOPHAGE) 1000 MG tablet    Other Relevant Orders    CBC Auto Differential    Comprehensive Metabolic Panel    Urinalysis, Reflex to Urine Culture Urine, Clean Catch    Microalbumin/Creatinine Ratio, Urine    Hemoglobin A1C    Class 3 severe obesity due to excess calories with serious comorbidity and body mass index (BMI) of 40.0 to 44.9 in adult (Chronic)     BMI: 40.04  TSH: 1.3  Vitamin D level: ordered  HgbA1c: 6.8  Weight Loss Encouraged  Increase Physical Activity           Relevant Orders    Vitamin D    Hypothyroid (Chronic)     TSH: 1.3  Continue Synthroid 150 mcg daily              Orthopedic    Bilateral hand pain - Primary     Bilateral hand X-ray  ordered           Relevant Orders    X-Ray Hand 3 view Left    X-Ray Hand 3 view Right          Plan:    Patient to follow up in 4 months with labs to be done prior to appointment.

## 2022-08-02 PROBLEM — M79.642 BILATERAL HAND PAIN: Status: ACTIVE | Noted: 2022-08-02

## 2022-08-02 PROBLEM — F41.9 ANXIETY: Chronic | Status: RESOLVED | Noted: 2022-08-02 | Resolved: 2022-08-02

## 2022-08-02 PROBLEM — I48.0 PAROXYSMAL ATRIAL FIBRILLATION: Chronic | Status: ACTIVE | Noted: 2022-06-01

## 2022-08-02 PROBLEM — Z95.2 H/O MECHANICAL AORTIC VALVE REPLACEMENT: Chronic | Status: ACTIVE | Noted: 2022-06-01

## 2022-08-02 PROBLEM — E03.9 HYPOTHYROID: Chronic | Status: ACTIVE | Noted: 2022-08-02

## 2022-08-02 PROBLEM — F41.9 ANXIETY: Chronic | Status: ACTIVE | Noted: 2022-08-02

## 2022-08-02 PROBLEM — M79.641 BILATERAL HAND PAIN: Status: ACTIVE | Noted: 2022-08-02

## 2022-08-02 PROBLEM — I10 PRIMARY HYPERTENSION: Chronic | Status: ACTIVE | Noted: 2022-08-02

## 2022-08-02 NOTE — ASSESSMENT & PLAN NOTE
Continue Metformin 1,000 mg bid  Continue home CBG monitoring.  Hypoglycemic episodes: denies  BMI: 40.04  HgbA1c: 6.8  UA Creatinine: ordered  UA Microalbumin: ordered  On Atorvastatin  Weight Loss Encouraged  ADA Diet

## 2022-08-02 NOTE — ASSESSMENT & PLAN NOTE
BMI: 40.04  TSH: 1.3  Vitamin D level: ordered  HgbA1c: 6.8  Weight Loss Encouraged  Increase Physical Activity

## 2022-08-02 NOTE — ASSESSMENT & PLAN NOTE
B/P: 132/84  EK2020  Continue Triamterene-HCTZ 37.5-25 mg daily  DASH diet  Encouraged home blood pressure monitoring

## 2022-08-02 NOTE — ASSESSMENT & PLAN NOTE
Continue Atorvastatin 20 mg daily  Weight loss encouraged  Low fat/high fiber diet  Increase physical activity  Chol: 154  HDL: 51  Tri  LDL: 79

## 2022-08-05 RX ORDER — TRIAMTERENE AND HYDROCHLOROTHIAZIDE 37.5; 25 MG/1; MG/1
1 CAPSULE ORAL EVERY MORNING
Qty: 30 CAPSULE | Refills: 6 | Status: SHIPPED | OUTPATIENT
Start: 2022-08-05 | End: 2023-05-08 | Stop reason: SDUPTHER

## 2022-08-05 RX ORDER — METOPROLOL TARTRATE 25 MG/1
25 TABLET, FILM COATED ORAL 2 TIMES DAILY
Qty: 60 TABLET | Refills: 6 | Status: SHIPPED | OUTPATIENT
Start: 2022-08-05 | End: 2023-05-08 | Stop reason: SDUPTHER

## 2022-08-15 ENCOUNTER — OFFICE VISIT (OUTPATIENT)
Dept: HEMATOLOGY/ONCOLOGY | Facility: CLINIC | Age: 47
End: 2022-08-15
Payer: MEDICAID

## 2022-08-15 VITALS — WEIGHT: 200 LBS | HEIGHT: 64 IN | BODY MASS INDEX: 34.15 KG/M2

## 2022-08-15 DIAGNOSIS — Z80.3 FAMILY HX-BREAST MALIGNANCY: ICD-10-CM

## 2022-08-15 PROCEDURE — 1159F MED LIST DOCD IN RCRD: CPT | Mod: CPTII,95,, | Performed by: NURSE PRACTITIONER

## 2022-08-15 PROCEDURE — 3008F PR BODY MASS INDEX (BMI) DOCUMENTED: ICD-10-PCS | Mod: CPTII,95,, | Performed by: NURSE PRACTITIONER

## 2022-08-15 PROCEDURE — 99215 OFFICE O/P EST HI 40 MIN: CPT | Mod: 95,,, | Performed by: NURSE PRACTITIONER

## 2022-08-15 PROCEDURE — 1159F PR MEDICATION LIST DOCUMENTED IN MEDICAL RECORD: ICD-10-PCS | Mod: CPTII,95,, | Performed by: NURSE PRACTITIONER

## 2022-08-15 PROCEDURE — 99215 PR OFFICE/OUTPT VISIT, EST, LEVL V, 40-54 MIN: ICD-10-PCS | Mod: 95,,, | Performed by: NURSE PRACTITIONER

## 2022-08-15 PROCEDURE — 3008F BODY MASS INDEX DOCD: CPT | Mod: CPTII,95,, | Performed by: NURSE PRACTITIONER

## 2022-08-15 NOTE — PROGRESS NOTES
REFERRING PHYSICIAN: Rebecca Sterling NP    Subjective:       Patient ID: Zahira العراقي is a 46 y.o. female.    Chief Complaint: No personal history of cancer but a family history of cancer. Patient presented via Telemedicine Visit (audio only) today for risk assessment, genetic counseling, and consideration for genetic testing.    HPI  Past Medical History:   Diagnosis Date    Anticoagulant long-term use     Diabetes mellitus     Hyperlipidemia     Hypertension     Iron deficiency anemia     Paroxysmal atrial fibrillation     Thyroid disease       Past Surgical History:   Procedure Laterality Date    AORTIC VALVE REPLACEMENT N/A      SECTION      3     TUBAL LIGATION       ALLERGIES:  Lamotrigine, Ondansetron, and Ondansetron hcl (pf)    REVIEW OF SYSTEMS:  ROS         Oncology History            Family History   Problem Relation Age of Onset    Diabetes Mother     Aneurysm Mother     Stroke Mother     No Known Problems Father     Breast cancer Maternal Grandmother 50        diagnosed twice: 50y,60y    Stomach cancer Maternal Grandmother 70    Breast cancer Paternal Grandmother     Brain cancer Paternal Grandmother     Lymphoma Paternal Grandfather         Assessment:   Risk Assessment:  This patient is at increased risk of having an inherited genetic mutation that increases the risk for cancer. She meets criteria for genetic testing based on the National Comprehensive Cancer Network (NCCN) criteria due to a family history that includes breast cancer in a second degree relative  (see family history and pedigree). Based on her likelihood of having a mutation, [place] cancer panel testing was described in detail.    Education and Counseling:  [facility] CancerNext evaluates a broad number of hereditary cancer syndromes to help define patients' cancer risk. This cancer panel tests 36 genes with known association to increased cancer risk: APC, JUAN ALBERTO, AXIN2, BARD1, BRCA1, BRCA2,  BMPR1A, BRIP1, CDH1, CDK4, CDKN2A, CHEK2, DICER1, HOXB13, EPCAM, GREM1, MLH1, MSH2, MSH6, MUTYH, NBN, NF1, PALB2, PMS2, POLD1, POLE, PTEN, RAD50, RECQL, RAD51C, RAD51D, SMAD4, SMARCA4, STK11, TP53.    Risks of cancer associated with inherited cancer predisposition mutations were discussed in detail.  If a mutation were found, this patient would have a significantly increased risk for cancer.  Inherited cancer syndromes included in this test, may have different, but still significant risk for cancer.  Risk of cancer with any particular gene mutation will be discussed at the time of results disclosure and based on the results.    The availability of clinical management options for inherited cancer predisposition mutation carriers was discussed, including increased surveillance, chemoprevention, and prophylactic surgery. Details of the testing process, including benefits and limitations of genetic analysis as well as the implications of possible test results, were discussed.  Because this patient is the first member of her family to be tested comprehensive panel testing was presented.  Related insurance issues were discussed.      Summary:  This patient was evaluated for hereditary risk of cancer and was found to be at an increased risk of having an inherited cancer predisposition gene mutation.  The option of genetic testing was explained in detail, including the possible impact of this information on family members.  Since this patient wishes to proceed with testing an order will be placed online with [address]. Informed consent will be obtained, lab drawn and sent to [address].  Results will be expected 4 weeks from this time.  A follow-up appointment will be scheduled for results disclosure.       The patient location is: home.     Visit type: audio only    Time with patient: 30 minutes  60 minutes of total time spent on the encounter, which includes face to face time and non-face to face time preparing  to see the patient (eg, review of tests), Obtaining and/or reviewing separately obtained history, Documenting clinical information in the electronic or other health record, Independently interpreting results (not separately reported) and communicating results to the patient/family/caregiver, or Care coordination (not separately reported).       Each patient to whom he or she provides medical services by telemedicine is:  (1) informed of the relationship between the physician and patient and the respective role of any other health care provider with respect to management of the patient; and (2) notified that he or she may decline to receive medical services by telemedicine and may withdraw from such care at any time.    AMOS CORREA, PhD

## 2022-10-26 ENCOUNTER — CLINICAL SUPPORT (OUTPATIENT)
Dept: INTERNAL MEDICINE | Facility: CLINIC | Age: 47
End: 2022-10-26
Payer: MEDICAID

## 2022-10-26 ENCOUNTER — HOSPITAL ENCOUNTER (OUTPATIENT)
Dept: RADIOLOGY | Facility: HOSPITAL | Age: 47
Discharge: HOME | End: 2022-10-26
Attending: NURSE PRACTITIONER
Payer: MEDICAID

## 2022-10-26 ENCOUNTER — OFFICE VISIT (OUTPATIENT)
Dept: INTERNAL MEDICINE | Facility: CLINIC | Age: 47
End: 2022-10-26
Payer: MEDICAID

## 2022-10-26 VITALS
WEIGHT: 210.19 LBS | TEMPERATURE: 99 F | BODY MASS INDEX: 35.88 KG/M2 | SYSTOLIC BLOOD PRESSURE: 121 MMHG | RESPIRATION RATE: 18 BRPM | DIASTOLIC BLOOD PRESSURE: 74 MMHG | HEART RATE: 78 BPM | HEIGHT: 64 IN

## 2022-10-26 DIAGNOSIS — E11.9 TYPE 2 DIABETES MELLITUS WITHOUT COMPLICATION, WITHOUT LONG-TERM CURRENT USE OF INSULIN: Chronic | ICD-10-CM

## 2022-10-26 DIAGNOSIS — E03.9 HYPOTHYROIDISM, UNSPECIFIED TYPE: Chronic | ICD-10-CM

## 2022-10-26 DIAGNOSIS — E66.01 CLASS 2 SEVERE OBESITY DUE TO EXCESS CALORIES WITH SERIOUS COMORBIDITY AND BODY MASS INDEX (BMI) OF 36.0 TO 36.9 IN ADULT: Chronic | ICD-10-CM

## 2022-10-26 DIAGNOSIS — Z95.2 H/O MECHANICAL AORTIC VALVE REPLACEMENT: Chronic | ICD-10-CM

## 2022-10-26 DIAGNOSIS — Z12.12 ENCOUNTER FOR COLORECTAL CANCER SCREENING: ICD-10-CM

## 2022-10-26 DIAGNOSIS — M25.561 ACUTE PAIN OF RIGHT KNEE: ICD-10-CM

## 2022-10-26 DIAGNOSIS — I10 PRIMARY HYPERTENSION: Chronic | ICD-10-CM

## 2022-10-26 DIAGNOSIS — M79.641 BILATERAL HAND PAIN: ICD-10-CM

## 2022-10-26 DIAGNOSIS — Z95.2 HISTORY OF AORTIC VALVE REPLACEMENT: Primary | ICD-10-CM

## 2022-10-26 DIAGNOSIS — E78.2 MIXED HYPERLIPIDEMIA: Chronic | ICD-10-CM

## 2022-10-26 DIAGNOSIS — M79.642 BILATERAL HAND PAIN: ICD-10-CM

## 2022-10-26 DIAGNOSIS — M25.561 ACUTE PAIN OF RIGHT KNEE: Primary | ICD-10-CM

## 2022-10-26 DIAGNOSIS — F41.9 ANXIETY: Chronic | ICD-10-CM

## 2022-10-26 DIAGNOSIS — Z12.11 ENCOUNTER FOR COLORECTAL CANCER SCREENING: ICD-10-CM

## 2022-10-26 PROCEDURE — 73130 X-RAY EXAM OF HAND: CPT | Mod: TC,LT

## 2022-10-26 PROCEDURE — 1160F PR REVIEW ALL MEDS BY PRESCRIBER/CLIN PHARMACIST DOCUMENTED: ICD-10-PCS | Mod: CPTII,,, | Performed by: NURSE PRACTITIONER

## 2022-10-26 PROCEDURE — 3078F DIAST BP <80 MM HG: CPT | Mod: CPTII,,, | Performed by: NURSE PRACTITIONER

## 2022-10-26 PROCEDURE — 3074F SYST BP LT 130 MM HG: CPT | Mod: CPTII,,, | Performed by: NURSE PRACTITIONER

## 2022-10-26 PROCEDURE — 99214 PR OFFICE/OUTPT VISIT, EST, LEVL IV, 30-39 MIN: ICD-10-PCS | Mod: S$PBB,,, | Performed by: NURSE PRACTITIONER

## 2022-10-26 PROCEDURE — 99215 OFFICE O/P EST HI 40 MIN: CPT | Mod: PBBFAC | Performed by: NURSE PRACTITIONER

## 2022-10-26 PROCEDURE — 1160F RVW MEDS BY RX/DR IN RCRD: CPT | Mod: CPTII,,, | Performed by: NURSE PRACTITIONER

## 2022-10-26 PROCEDURE — 99214 OFFICE O/P EST MOD 30 MIN: CPT | Mod: S$PBB,,, | Performed by: NURSE PRACTITIONER

## 2022-10-26 PROCEDURE — 1159F PR MEDICATION LIST DOCUMENTED IN MEDICAL RECORD: ICD-10-PCS | Mod: CPTII,,, | Performed by: NURSE PRACTITIONER

## 2022-10-26 PROCEDURE — 73130 X-RAY EXAM OF HAND: CPT | Mod: TC,RT

## 2022-10-26 PROCEDURE — 73560 X-RAY EXAM OF KNEE 1 OR 2: CPT | Mod: TC,RT

## 2022-10-26 PROCEDURE — 3074F PR MOST RECENT SYSTOLIC BLOOD PRESSURE < 130 MM HG: ICD-10-PCS | Mod: CPTII,,, | Performed by: NURSE PRACTITIONER

## 2022-10-26 PROCEDURE — 3078F PR MOST RECENT DIASTOLIC BLOOD PRESSURE < 80 MM HG: ICD-10-PCS | Mod: CPTII,,, | Performed by: NURSE PRACTITIONER

## 2022-10-26 PROCEDURE — 1159F MED LIST DOCD IN RCRD: CPT | Mod: CPTII,,, | Performed by: NURSE PRACTITIONER

## 2022-10-26 RX ORDER — TRAMADOL HYDROCHLORIDE 50 MG/1
25 TABLET ORAL EVERY 6 HOURS
Qty: 4 TABLET | Refills: 0 | Status: SHIPPED | OUTPATIENT
Start: 2022-10-26 | End: 2022-10-28

## 2022-10-26 RX ORDER — FLUOXETINE HYDROCHLORIDE 20 MG/1
20 CAPSULE ORAL DAILY
Qty: 30 CAPSULE | Refills: 11 | Status: SHIPPED | OUTPATIENT
Start: 2022-10-26 | End: 2023-03-27 | Stop reason: SDUPTHER

## 2022-10-26 NOTE — PROGRESS NOTES
Poc Pt/Inr results 2.2/26.3 (Hx of AVR Normal range 2.5-3.5). Inr level below normal range denies missing Warfarin doses may be a dietary issue. Is difficult and a danger to manage due to non compliance with attending Coumadin clinic appointments as scheduled due to transportation issues previous visit was 6/1/22. Takes Warfarin 6 mg. Patient weight 210 pounds. Return to clinic 11/1/22.

## 2022-10-26 NOTE — PROGRESS NOTES
Subjective:       Patient ID: Zahira العراقي is a 46 y.o. female.    Chief Complaint: Knee Pain (C/O right knee pain for 2-3 weeks. Swelling at times . Did not go to ED or [facility])    Patient has diagnosis of HTN, HLD, DM2, Hypothyroidism, A-Fib, Bioprosthetic Aortic Valve Replacement (2012) with Revision Mechanical Aortic Valve Replacement (10/2018), Hx of Endocarditis, Hx of COVID-19. Patient seen today for right knee pain. Patient last seen in clinic on 08/01/2022. Today, patient states right knee pain/swelling x2 weeks. Patient denies injury. Patient had bilateral hand X-ray ordered at last visit but did not complete. Patient states still has hand pain at times. Patient also requesting to increase Prozac. Patient states it is helping some. Patient denies any other acute complaints.       Patient followed by Oncology Clinic. Last appointment on 08/15/2022. No personal history of cancer but a family history of cancer. Patient presented via Telemedicine Visit (audio only) today for risk assessment, genetic counseling, and consideration for genetic testing. This patient was evaluated for hereditary risk of cancer and was found to be at an increased risk of having an inherited cancer predisposition gene mutation.  The option of genetic testing was explained in detail, including the possible impact of this information on family members.  Since this patient wishes to proceed with testing an order will be placed online with [facility]. Informed consent will be obtained, lab drawn and sent to [facility].  Results will be expected 4 weeks from this time.  A follow-up appointment will be scheduled for results disclosure.     Patient followed by [facility]. Last appointment on 10/26/2022.      Patient followed by Cardiology Clinic. Last appointment on 06/01/2022. Past medical history of bioprosthetic aortic valve replacement in 2012 S/T endocarditis with revisional mechanical aortic valve replacement in  2018 presents for 4 month follow up and results of Echocardiogram. She completed an Echocardiogram on 2021 which revealed an EF of approximately 60%, normally functioning mechanical prosthetic aortic valve, mild AR, and mild TR. Of note, after her valve replacement surgery in , she developed A Fib and remains on Coumadin. Today, she reports feeling well with only occasional dizziness that she thinks is due to significant menorrhagia. She is getting evaluated by women's clinic and will be seen by OBGYN in a few weeks. She otherwise denies SOB, chest pain on exertion and has been more active. 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 No other symptoms. Complaint with meds. Mechanical Aortic Valve Replacement S/T failure of bioprosthetic aortic valve: Mechanical aortic valve s/p replacement in Oct 2018; EF 60% per Echo 2021 - normal mechanical aortic valve function, mild AR; Continue INR checks at Coumadin clinic; Atrial Fibrillation S/T post-op mechanical valve replacement; No recent episodes; Echo 2021 - EF 60%; Continue Metoprolol Tartrate 25mg BID and Coumadin monitoring. HLD: Lipid panel improved at 91 in  still not at goal for her history of diabetes; Pt agreed to start lipitor 20mg, has been compliant; No recent FLP, will repeat today. Patient to follow up in 6 months.      Patient followed by GYN clinic. Last appointment on 2022.  here for annual gyn exam. Reports hx of abnormal pap several years ago that required biopsy but no treatment. States last pap was a few years ago with outside provider. Her LMP was . Pt states over the last 6 months she is having a period every 2 weeks with cramping. Denies pain outside of period. Pt was apparently seen in ER for this and has an appt with gyn resident team later this month. She had TL for contraception. Pt has hx of HTN, DM, Afib. Pt also has hx of AVR and is currently on  coumadin. Denies any dosage changes in the past year. Fly hx includes maternal and paternal GM with hx of breast cancer. Denies concerns for STIs. Pt is . Patient has follow up appointment on 10/26/2022.         Mammogram: 05/31/2021, negative, ordered  Pap: 10/130/2017  FIT: deferred at this time  Diabetic Eye Exam: deferred at this time  Diabetic Foot Exam: 05/17/2021  Bone Density: deferred at this time  Medicare Wellness: N/A    Review of Systems   Constitutional: Negative.    HENT: Negative.     Eyes: Negative.    Respiratory: Negative.     Cardiovascular: Negative.    Gastrointestinal: Negative.    Endocrine: Negative.    Genitourinary: Negative.    Musculoskeletal:  Positive for arthralgias.   Integumentary:  Negative.   Allergic/Immunologic: Negative.    Neurological: Negative.    Hematological: Negative.    Psychiatric/Behavioral: Negative.         Objective:      Physical Exam  Vitals reviewed.   Constitutional:       Appearance: Normal appearance.   HENT:      Head: Normocephalic and atraumatic.      Mouth/Throat:      Mouth: Mucous membranes are moist.      Pharynx: Oropharynx is clear.   Eyes:      Extraocular Movements: Extraocular movements intact.      Conjunctiva/sclera: Conjunctivae normal.      Pupils: Pupils are equal, round, and reactive to light.   Cardiovascular:      Rate and Rhythm: Normal rate and regular rhythm.      Heart sounds: Normal heart sounds.   Pulmonary:      Effort: Pulmonary effort is normal.      Breath sounds: Normal breath sounds.   Abdominal:      General: Bowel sounds are normal.   Musculoskeletal:         General: Normal range of motion.      Cervical back: Normal range of motion.   Skin:     General: Skin is warm and dry.   Neurological:      Mental Status: She is alert and oriented to person, place, and time.   Psychiatric:         Mood and Affect: Mood normal.         Behavior: Behavior normal.       Assessment:       Problem List Items Addressed This Visit           Psychiatric    Anxiety (Chronic)     Increase Fluoxetine to 20 mg daily            Cardiac/Vascular    H/O mechanical aortic valve replacement (Chronic)     [facility]ed by [facility]         Hyperlipidemia (Chronic)     Continue Atorvastatin 20 mg daily  Weight loss encouraged  Low fat/high fiber diet  Increase physical activity  Chol: 154  HDL: 51  Tri  LDL: 79         Primary hypertension (Chronic)     B/P: 121/74  EK2020  Continue Triamterene-HCTZ 37.5-25 mg daily  DASH diet  Encouraged home blood pressure monitoring            Endocrine    Type 2 diabetes mellitus without complication, without long-term current use of insulin (Chronic)     Continue Metformin 1,000 mg bid  Continue home CBG monitoring.  Hypoglycemic episodes: denies  BMI: 40.04  HgbA1c: 6.8  UA Creatinine: ordered  UA Microalbumin: ordered  On Atorvastatin  Weight Loss Encouraged  ADA Diet         Class 2 severe obesity due to excess calories with serious comorbidity and body mass index (BMI) of 36.0 to 36.9 in adult (Chronic)     BMI: 36.06  TSH: 1.3  Vitamin D level: ordered  HgbA1c: 6.8  Weight Loss Encouraged  Increase Physical Activity         Hypothyroid (Chronic)     TSH: 1.3  Continue Synthroid 150 mcg daily            Orthopedic    Acute pain of right knee - Primary     Right knee X-ray ordered  Rheumatoid panel ordered  Tramadol 25 mg Q6h prn x2 days         Relevant Orders    RHEUMATOID ARTHRITIS PANEL    C-Reactive Protein    Sedimentation rate     Other Visit Diagnoses       Encounter for colorectal cancer screening        Relevant Orders    Cologuard Screening (Multitarget Stool DNA)            Plan:    Patient has follow up appointment scheduled for 2022.

## 2022-10-27 ENCOUNTER — TELEPHONE (OUTPATIENT)
Dept: INTERNAL MEDICINE | Facility: CLINIC | Age: 47
End: 2022-10-27
Payer: MEDICAID

## 2022-10-27 NOTE — TELEPHONE ENCOUNTER
Orders Only  Open   10/26/2022  [facility]  JIGAR Lemus  Family Medicine Acute pain of right knee  Dx     Conversation  (Oldest Message First)  October 26, 2022  JIGAR Lemus  to Roman CANALES Staff    MK    2:44 PM  Please notify patient that referral for orthopedic clinic ordered for right knee pain. X-ray negative.   October 27, 2022  Me  to JIGAR Lemus    CB     1:37 PM  Note    Attempted to call pt. NA and no VM. Will try again later.

## 2022-10-28 PROBLEM — E66.812 CLASS 2 SEVERE OBESITY DUE TO EXCESS CALORIES WITH SERIOUS COMORBIDITY AND BODY MASS INDEX (BMI) OF 36.0 TO 36.9 IN ADULT: Chronic | Status: ACTIVE | Noted: 2022-06-01

## 2022-10-28 PROBLEM — M25.561 ACUTE PAIN OF RIGHT KNEE: Status: ACTIVE | Noted: 2022-10-28

## 2022-10-28 NOTE — ASSESSMENT & PLAN NOTE
BMI: 36.06  TSH: 1.3  Vitamin D level: ordered  HgbA1c: 6.8  Weight Loss Encouraged  Increase Physical Activity

## 2022-10-28 NOTE — ASSESSMENT & PLAN NOTE
B/P: 121/74  EK2020  Continue Triamterene-HCTZ 37.5-25 mg daily  DASH diet  Encouraged home blood pressure monitoring

## 2022-12-01 ENCOUNTER — OFFICE VISIT (OUTPATIENT)
Dept: INTERNAL MEDICINE | Facility: CLINIC | Age: 47
End: 2022-12-01
Payer: MEDICAID

## 2022-12-01 ENCOUNTER — CLINICAL SUPPORT (OUTPATIENT)
Dept: INTERNAL MEDICINE | Facility: CLINIC | Age: 47
End: 2022-12-01
Attending: NURSE PRACTITIONER
Payer: MEDICAID

## 2022-12-01 ENCOUNTER — CLINICAL SUPPORT (OUTPATIENT)
Dept: INTERNAL MEDICINE | Facility: CLINIC | Age: 47
End: 2022-12-01
Payer: MEDICAID

## 2022-12-01 ENCOUNTER — APPOINTMENT (OUTPATIENT)
Dept: LAB | Facility: HOSPITAL | Age: 47
End: 2022-12-01
Attending: NURSE PRACTITIONER
Payer: MEDICAID

## 2022-12-01 VITALS
SYSTOLIC BLOOD PRESSURE: 124 MMHG | HEART RATE: 77 BPM | TEMPERATURE: 99 F | WEIGHT: 212 LBS | BODY MASS INDEX: 36.19 KG/M2 | RESPIRATION RATE: 18 BRPM | HEIGHT: 64 IN | DIASTOLIC BLOOD PRESSURE: 88 MMHG

## 2022-12-01 DIAGNOSIS — E66.01 CLASS 2 SEVERE OBESITY DUE TO EXCESS CALORIES WITH SERIOUS COMORBIDITY AND BODY MASS INDEX (BMI) OF 36.0 TO 36.9 IN ADULT: Chronic | ICD-10-CM

## 2022-12-01 DIAGNOSIS — F41.9 ANXIETY: Chronic | ICD-10-CM

## 2022-12-01 DIAGNOSIS — Z95.2 H/O MECHANICAL AORTIC VALVE REPLACEMENT: Chronic | ICD-10-CM

## 2022-12-01 DIAGNOSIS — I10 PRIMARY HYPERTENSION: Chronic | ICD-10-CM

## 2022-12-01 DIAGNOSIS — Z13.5 DIABETIC RETINOPATHY SCREENING: ICD-10-CM

## 2022-12-01 DIAGNOSIS — E55.9 VITAMIN D DEFICIENCY: Chronic | ICD-10-CM

## 2022-12-01 DIAGNOSIS — M25.50 ARTHRALGIA, UNSPECIFIED JOINT: Chronic | ICD-10-CM

## 2022-12-01 DIAGNOSIS — E03.9 HYPOTHYROIDISM, UNSPECIFIED TYPE: Chronic | ICD-10-CM

## 2022-12-01 DIAGNOSIS — E78.2 MIXED HYPERLIPIDEMIA: Chronic | ICD-10-CM

## 2022-12-01 DIAGNOSIS — Z13.5 DIABETIC RETINOPATHY SCREENING: Primary | ICD-10-CM

## 2022-12-01 DIAGNOSIS — Z12.4 CERVICAL CANCER SCREENING: ICD-10-CM

## 2022-12-01 DIAGNOSIS — Z95.2 HISTORY OF AORTIC VALVE REPLACEMENT: Primary | ICD-10-CM

## 2022-12-01 DIAGNOSIS — E11.65 TYPE 2 DIABETES MELLITUS WITH HYPERGLYCEMIA, WITHOUT LONG-TERM CURRENT USE OF INSULIN: Primary | Chronic | ICD-10-CM

## 2022-12-01 DIAGNOSIS — I48.0 PAROXYSMAL ATRIAL FIBRILLATION: Chronic | ICD-10-CM

## 2022-12-01 PROCEDURE — 99214 PR OFFICE/OUTPT VISIT, EST, LEVL IV, 30-39 MIN: ICD-10-PCS | Mod: 25,S$PBB,, | Performed by: NURSE PRACTITIONER

## 2022-12-01 PROCEDURE — 3074F PR MOST RECENT SYSTOLIC BLOOD PRESSURE < 130 MM HG: ICD-10-PCS | Mod: CPTII,,, | Performed by: NURSE PRACTITIONER

## 2022-12-01 PROCEDURE — 3066F NEPHROPATHY DOC TX: CPT | Mod: CPTII,,, | Performed by: NURSE PRACTITIONER

## 2022-12-01 PROCEDURE — 99215 OFFICE O/P EST HI 40 MIN: CPT | Mod: 25,PBBFAC | Performed by: NURSE PRACTITIONER

## 2022-12-01 PROCEDURE — 2025F 7 FLD RTA PHOTO W/O RTNOPTHY: CPT | Mod: CPTII,,, | Performed by: NURSE PRACTITIONER

## 2022-12-01 PROCEDURE — 3066F PR DOCUMENTATION OF TREATMENT FOR NEPHROPATHY: ICD-10-PCS | Mod: CPTII,,, | Performed by: NURSE PRACTITIONER

## 2022-12-01 PROCEDURE — 1160F RVW MEDS BY RX/DR IN RCRD: CPT | Mod: CPTII,,, | Performed by: NURSE PRACTITIONER

## 2022-12-01 PROCEDURE — 1159F PR MEDICATION LIST DOCUMENTED IN MEDICAL RECORD: ICD-10-PCS | Mod: CPTII,,, | Performed by: NURSE PRACTITIONER

## 2022-12-01 PROCEDURE — 3008F BODY MASS INDEX DOCD: CPT | Mod: CPTII,,, | Performed by: NURSE PRACTITIONER

## 2022-12-01 PROCEDURE — 2025F PR 7 STANDARD FLD STEREO RETINAL PHOTOS W/O EVID OF RETINOPATHY W/INTERPT BY OPHTH/OPT: ICD-10-PCS | Mod: CPTII,,, | Performed by: NURSE PRACTITIONER

## 2022-12-01 PROCEDURE — 3060F PR POS MICROALBUMINURIA RESULT DOCUMENTED/REVIEW: ICD-10-PCS | Mod: CPTII,,, | Performed by: NURSE PRACTITIONER

## 2022-12-01 PROCEDURE — 3079F DIAST BP 80-89 MM HG: CPT | Mod: CPTII,,, | Performed by: NURSE PRACTITIONER

## 2022-12-01 PROCEDURE — 99214 OFFICE O/P EST MOD 30 MIN: CPT | Mod: 25,S$PBB,, | Performed by: NURSE PRACTITIONER

## 2022-12-01 PROCEDURE — 1160F PR REVIEW ALL MEDS BY PRESCRIBER/CLIN PHARMACIST DOCUMENTED: ICD-10-PCS | Mod: CPTII,,, | Performed by: NURSE PRACTITIONER

## 2022-12-01 PROCEDURE — 3008F PR BODY MASS INDEX (BMI) DOCUMENTED: ICD-10-PCS | Mod: CPTII,,, | Performed by: NURSE PRACTITIONER

## 2022-12-01 PROCEDURE — 1159F MED LIST DOCD IN RCRD: CPT | Mod: CPTII,,, | Performed by: NURSE PRACTITIONER

## 2022-12-01 PROCEDURE — 99211 OFF/OP EST MAY X REQ PHY/QHP: CPT | Mod: 25,27,PBBFAC

## 2022-12-01 PROCEDURE — 3074F SYST BP LT 130 MM HG: CPT | Mod: CPTII,,, | Performed by: NURSE PRACTITIONER

## 2022-12-01 PROCEDURE — 3060F POS MICROALBUMINURIA REV: CPT | Mod: CPTII,,, | Performed by: NURSE PRACTITIONER

## 2022-12-01 PROCEDURE — 3079F PR MOST RECENT DIASTOLIC BLOOD PRESSURE 80-89 MM HG: ICD-10-PCS | Mod: CPTII,,, | Performed by: NURSE PRACTITIONER

## 2022-12-01 RX ORDER — ERGOCALCIFEROL 1.25 MG/1
50000 CAPSULE ORAL
Qty: 12 CAPSULE | Refills: 2 | Status: SHIPPED | OUTPATIENT
Start: 2022-12-01 | End: 2023-03-27 | Stop reason: SDUPTHER

## 2022-12-01 RX ORDER — ATORVASTATIN CALCIUM 20 MG/1
20 TABLET, FILM COATED ORAL DAILY
Qty: 90 TABLET | Refills: 2 | Status: SHIPPED | OUTPATIENT
Start: 2022-12-01 | End: 2023-03-27 | Stop reason: SDUPTHER

## 2022-12-01 RX ORDER — LEVOTHYROXINE SODIUM 150 UG/1
150 TABLET ORAL DAILY
Qty: 90 TABLET | Refills: 2 | Status: SHIPPED | OUTPATIENT
Start: 2022-12-01 | End: 2023-03-27 | Stop reason: SDUPTHER

## 2022-12-01 RX ORDER — METFORMIN HYDROCHLORIDE 1000 MG/1
1000 TABLET ORAL 2 TIMES DAILY
Qty: 180 TABLET | Refills: 2 | Status: SHIPPED | OUTPATIENT
Start: 2022-12-01 | End: 2023-03-27 | Stop reason: SDUPTHER

## 2022-12-01 RX ORDER — BUSPIRONE HYDROCHLORIDE 15 MG/1
15 TABLET ORAL 2 TIMES DAILY
Qty: 180 TABLET | Refills: 2 | Status: SHIPPED | OUTPATIENT
Start: 2022-12-01 | End: 2023-03-27 | Stop reason: SDUPTHER

## 2022-12-01 NOTE — PROGRESS NOTES
Poc Pt/Inr results 2.6/30.6 (Hx of AVR Normal range 2.5-3.5). Inr level within normal range. Is difficult and a danger to manage due to non compliance with attending Coumadin clinic appointments as scheduled due to transportation issues previous visit was 10/6/22. Takes Warfarin 6 mg. Patient weight 212 pounds. Return to clinic 1/12/23.

## 2022-12-01 NOTE — PROGRESS NOTES
Subjective:       Patient ID: Zahira العراقي is a 47 y.o. female.    Chief Complaint: Follow-up, Results (C/O joint pain. 2. Severe acid reflux 3. Request new sleep aide), and Anxiety    Patient has diagnosis of HTN, HLD, DM2, Hypothyroidism, A-Fib, Bioprosthetic Aortic Valve Replacement (2012) with Revision Mechanical Aortic Valve Replacement (10/2018), Hx of Endocarditis, Hx of COVID-19. Patient seen in clinic today for follow up on DM2. Patient last seen in clinic on 10/26/2022. Today, patient states chronic joint pain. Bilateral hand X-ray completed on 10/26/2022, negative. Rheumatoid factors ordered, results pending. Prozac was increased to 20 mg daily, patient states improvement, states tolerating medication increase well. Previous A1c was 6.8, current A1c is 8.4. Patient currently on Metformin 1,000 mg bid. Patient states she isn't following an ADA diet. Patient states she will try to do better. A referral to [facility] for anxiety, depression was ordered in 08/2022, no appointment noted, will follow up. Patient denies any other acute complaints.        Patient followed by Oncology Clinic. Last appointment on 08/15/2022. No personal history of cancer but a family history of cancer. Patient presented via Telemedicine Visit (audio only) today for risk assessment, genetic counseling, and consideration for genetic testing. This patient was evaluated for hereditary risk of cancer and was found to be at an increased risk of having an inherited cancer predisposition gene mutation.  The option of genetic testing was explained in detail, including the possible impact of this information on family members.  Since this patient wishes to proceed with testing an order will be placed online with [employer]. Informed consent will be obtained, lab drawn and sent to [employer].  Results will be expected 4 weeks from this time.  A follow-up appointment will be scheduled for results disclosure. Follow up appointment  was scheduled for 10/03/2022, patient canceled, appointment not rescheduled.      Patient followed by [facility]. Last appointment on 10/26/2022.      Patient followed by Cardiology Clinic. Last appointment on 2022. Past medical history of bioprosthetic aortic valve replacement in  S/T endocarditis with revisional mechanical aortic valve replacement in 2018 presents for 4 month follow up and results of Echocardiogram. She completed an Echocardiogram on 2021 which revealed an EF of approximately 60%, normally functioning mechanical prosthetic aortic valve, mild AR, and mild TR. Of note, after her valve replacement surgery in , she developed A Fib and remains on Coumadin. Today, she reports feeling well with only occasional dizziness that she thinks is due to significant menorrhagia. She is getting evaluated by women's clinic and will be seen by OBGYN in a few weeks. She otherwise denies SOB, chest pain on exertion and has been more active. 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 No other symptoms. Complaint with meds. Mechanical Aortic Valve Replacement S/T failure of bioprosthetic aortic valve: Mechanical aortic valve s/p replacement in Oct 2018; EF 60% per Echo 2021 - normal mechanical aortic valve function, mild AR; Continue INR checks at Coumadin clinic; Atrial Fibrillation S/T post-op mechanical valve replacement; No recent episodes; Echo 2021 - EF 60%; Continue Metoprolol Tartrate 25mg BID and Coumadin monitoring. HLD: Lipid panel improved at 91 in  still not at goal for her history of diabetes; Pt agreed to start lipitor 20mg, has been compliant; No recent FLP, will repeat today. Patient to follow up in 6 months. No follow up appointment noted, will send message to Cardiology Clinic.      Patient followed by GYN clinic. Last appointment on 2022.  here for annual gyn exam. Reports hx of abnormal pap several  years ago that required biopsy but no treatment. States last pap was a few years ago with outside provider. Her LMP was 5/23/202. Pt states over the last 6 months she is having a period every 2 weeks with cramping. Denies pain outside of period. Pt was apparently seen in ER for this and has an appt with gyn resident team later this month. She had TL for contraception. Pt has hx of HTN, DM, Afib. Pt also has hx of AVR and is currently on coumadin. Denies any dosage changes in the past year. Fly hx includes maternal and paternal GM with hx of breast cancer. Denies concerns for STIs. Pt is . Patient has follow up appointment on 10/26/2022, patient canceled appointment, appointment not rescheduled.         Mammogram: 05/31/2021, negative, ordered on 06/01/2022  Pap: 10/130/2017  FIT: Cologuard ordered 10/26/2022  Diabetic Eye Exam: 12/01/2022  Diabetic Foot Exam: 12/01/2022  Bone Density: deferred at this time  Medicare Wellness: N/A    Review of Systems   Constitutional: Negative.    HENT: Negative.     Eyes: Negative.    Respiratory: Negative.     Cardiovascular: Negative.    Gastrointestinal: Negative.    Endocrine: Negative.    Genitourinary: Negative.    Musculoskeletal:  Positive for arthralgias.   Integumentary:  Negative.   Allergic/Immunologic: Negative.    Neurological: Negative.    Hematological: Negative.    Psychiatric/Behavioral: Negative.         Objective:      Physical Exam  Vitals reviewed.   Constitutional:       Appearance: Normal appearance.   HENT:      Head: Normocephalic and atraumatic.      Mouth/Throat:      Mouth: Mucous membranes are moist.      Pharynx: Oropharynx is clear.   Eyes:      Extraocular Movements: Extraocular movements intact.      Conjunctiva/sclera: Conjunctivae normal.      Pupils: Pupils are equal, round, and reactive to light.   Cardiovascular:      Rate and Rhythm: Normal rate and regular rhythm.      Pulses:           Dorsalis pedis pulses are 1+ on the right side  and 1+ on the left side.      Heart sounds: Normal heart sounds.   Pulmonary:      Effort: Pulmonary effort is normal.      Breath sounds: Normal breath sounds.   Abdominal:      General: Bowel sounds are normal.   Musculoskeletal:         General: Normal range of motion.      Cervical back: Normal range of motion.   Feet:      Right foot:      Protective Sensation: 9 sites tested.  9 sites sensed.      Skin integrity: Skin integrity normal.      Toenail Condition: Right toenails are normal.      Left foot:      Protective Sensation: 9 sites tested.  9 sites sensed.      Skin integrity: Skin integrity normal.      Toenail Condition: Left toenails are normal.   Skin:     General: Skin is warm and dry.   Neurological:      Mental Status: She is alert and oriented to person, place, and time.   Psychiatric:         Mood and Affect: Mood normal.         Behavior: Behavior normal.       Assessment:       Problem List Items Addressed This Visit          Psychiatric    Anxiety (Chronic)     Mental Health referral ordered 08/2022, will follow up on referral  Continue Fluoxetine 20 mg daily, Trazodone 100 mg Qhs prn         Relevant Medications    busPIRone (BUSPAR) 15 MG tablet       Ophtho    Diabetic retinopathy screening    Relevant Orders    Diabetic Eye Screening Photo (Completed)       Cardiac/Vascular    H/O mechanical aortic valve replacement (Chronic)     Followed by Cardiology  Followed by [facility]  Continue Warfarin as prescribed         Paroxysmal atrial fibrillation (Chronic)     Followed by Cardiology Clinic         Hyperlipidemia (Chronic)     Continue Atorvastatin 20 mg daily  Weight loss encouraged  Low fat/high fiber diet  Increase physical activity   Latest Reference Range & Units 12/01/22 11:46   Cholesterol <=200 mg/dL 124   HDL 35 - 60 mg/dL 45   LDL Cholesterol External 50.00 - 140.00 mg/dL 59.00   Total Cholesterol/HDL Ratio 0 - 5  3   Triglycerides 37 - 140 mg/dL 98   Very Low Density  Lipoprotein  20            Primary hypertension (Chronic)     B/P: 124/88  EK2020  Continue Triamterene-HCTZ 37.5-25 mg daily  DASH diet  Encouraged home blood pressure monitoring            Renal/    Cervical cancer screening    Relevant Orders    Ambulatory referral/consult to Gynecology       Endocrine    Type 2 diabetes mellitus with hyperglycemia, without long-term current use of insulin - Primary (Chronic)     Continue Metformin 1,000 mg bid  Start Ozempic 0.25 mg weekly  Continue home CBG monitoring.  Hypoglycemic episodes: denies  BMI: 36.37  HgbA1c: 8.4  UA Creatinine: 81.1  UA Microalbumin: 48.7  On Atorvastatin  Weight Loss Encouraged  ADA Diet         Relevant Medications    metFORMIN (GLUCOPHAGE) 1000 MG tablet    semaglutide (OZEMPIC) 0.25 mg or 0.5 mg(2 mg/1.5 mL) pen injector    Other Relevant Orders    Comprehensive Metabolic Panel    Hemoglobin A1C    Class 2 severe obesity due to excess calories with serious comorbidity and body mass index (BMI) of 36.0 to 36.9 in adult (Chronic)     BMI: 36.37  TSH: 1.3  Vitamin D level: 26  HgbA1c: 8.4  Weight Loss Encouraged  Increase Physical Activity         Hypothyroid (Chronic)     TSH: 1.3  Continue Levothyroxine 150 mcg daily         Vitamin D deficiency (Chronic)     Vitamin D level: 26  Start Vitamin D 50,000 units weekly            Orthopedic    Joint pain (Chronic)     Chronic generalized joint pain  Bilateral Hand X-ray negative  Right Knee X-ray negative  Rheumatoid factors pending  Continue Tylenol prn pain  Orthopedic referral ordered for right knee pain            Plan:    Patient to follow up in 3 months with labs to be done prior to appointment to reassess DM2.

## 2022-12-02 DIAGNOSIS — G47.00 INSOMNIA, UNSPECIFIED TYPE: ICD-10-CM

## 2022-12-02 DIAGNOSIS — F41.9 ANXIETY: Primary | ICD-10-CM

## 2022-12-02 PROBLEM — E11.65 TYPE 2 DIABETES MELLITUS WITH HYPERGLYCEMIA, WITHOUT LONG-TERM CURRENT USE OF INSULIN: Chronic | Status: ACTIVE | Noted: 2022-06-01

## 2022-12-02 PROBLEM — M25.50 JOINT PAIN: Chronic | Status: ACTIVE | Noted: 2022-12-02

## 2022-12-02 PROBLEM — Z12.4 CERVICAL CANCER SCREENING: Status: ACTIVE | Noted: 2022-12-02

## 2022-12-02 PROBLEM — E55.9 VITAMIN D DEFICIENCY: Chronic | Status: ACTIVE | Noted: 2022-12-02

## 2022-12-02 PROBLEM — Z13.5 DIABETIC RETINOPATHY SCREENING: Status: ACTIVE | Noted: 2022-12-02

## 2022-12-02 NOTE — PROGRESS NOTES
Zahira العراقي is a 47 y.o. female here for a diabetic eye screening with non-dilated fundus photos per JIGAR [place].    Patient cooperative?: Yes  Small pupils?: No  Last eye exam: unknown    For exam results, see Encounter Report.

## 2022-12-12 ENCOUNTER — TELEPHONE (OUTPATIENT)
Dept: INTERNAL MEDICINE | Facility: CLINIC | Age: 47
End: 2022-12-12
Payer: MEDICAID

## 2022-12-12 NOTE — TELEPHONE ENCOUNTER
----- Message from JIGAR Lemus sent at 12/12/2022  7:15 AM CST -----  Please notify patient that Rheumatoid labs negative

## 2023-02-08 ENCOUNTER — PATIENT MESSAGE (OUTPATIENT)
Dept: ADMINISTRATIVE | Facility: HOSPITAL | Age: 48
End: 2023-02-08
Payer: MEDICAID

## 2023-02-27 NOTE — TELEPHONE ENCOUNTER
Called pt to schedule an appt for future refills, pt did not answer; was unable to leave voicemail due to one not being set up.

## 2023-03-06 PROBLEM — Z13.5 DIABETIC RETINOPATHY SCREENING: Status: RESOLVED | Noted: 2022-12-02 | Resolved: 2023-03-06

## 2023-03-27 ENCOUNTER — OFFICE VISIT (OUTPATIENT)
Dept: INTERNAL MEDICINE | Facility: CLINIC | Age: 48
End: 2023-03-27
Payer: MEDICAID

## 2023-03-27 VITALS
HEIGHT: 64 IN | BODY MASS INDEX: 34.15 KG/M2 | DIASTOLIC BLOOD PRESSURE: 75 MMHG | SYSTOLIC BLOOD PRESSURE: 107 MMHG | WEIGHT: 200 LBS | RESPIRATION RATE: 18 BRPM | HEART RATE: 73 BPM | TEMPERATURE: 99 F

## 2023-03-27 DIAGNOSIS — E03.9 HYPOTHYROIDISM, UNSPECIFIED TYPE: Chronic | ICD-10-CM

## 2023-03-27 DIAGNOSIS — F41.9 ANXIETY: Chronic | ICD-10-CM

## 2023-03-27 DIAGNOSIS — I10 PRIMARY HYPERTENSION: Chronic | ICD-10-CM

## 2023-03-27 DIAGNOSIS — I48.0 PAROXYSMAL ATRIAL FIBRILLATION: Chronic | ICD-10-CM

## 2023-03-27 DIAGNOSIS — E78.2 MIXED HYPERLIPIDEMIA: Chronic | ICD-10-CM

## 2023-03-27 DIAGNOSIS — E11.65 TYPE 2 DIABETES MELLITUS WITH HYPERGLYCEMIA, WITHOUT LONG-TERM CURRENT USE OF INSULIN: Primary | Chronic | ICD-10-CM

## 2023-03-27 DIAGNOSIS — E55.9 VITAMIN D DEFICIENCY: Chronic | ICD-10-CM

## 2023-03-27 DIAGNOSIS — R11.0 NAUSEA: ICD-10-CM

## 2023-03-27 DIAGNOSIS — E66.01 CLASS 2 SEVERE OBESITY DUE TO EXCESS CALORIES WITH SERIOUS COMORBIDITY AND BODY MASS INDEX (BMI) OF 36.0 TO 36.9 IN ADULT: Chronic | ICD-10-CM

## 2023-03-27 PROCEDURE — 3074F SYST BP LT 130 MM HG: CPT | Mod: CPTII,,, | Performed by: NURSE PRACTITIONER

## 2023-03-27 PROCEDURE — 99214 OFFICE O/P EST MOD 30 MIN: CPT | Mod: S$PBB,,, | Performed by: NURSE PRACTITIONER

## 2023-03-27 PROCEDURE — 1160F PR REVIEW ALL MEDS BY PRESCRIBER/CLIN PHARMACIST DOCUMENTED: ICD-10-PCS | Mod: CPTII,,, | Performed by: NURSE PRACTITIONER

## 2023-03-27 PROCEDURE — 3074F PR MOST RECENT SYSTOLIC BLOOD PRESSURE < 130 MM HG: ICD-10-PCS | Mod: CPTII,,, | Performed by: NURSE PRACTITIONER

## 2023-03-27 PROCEDURE — 99214 PR OFFICE/OUTPT VISIT, EST, LEVL IV, 30-39 MIN: ICD-10-PCS | Mod: S$PBB,,, | Performed by: NURSE PRACTITIONER

## 2023-03-27 PROCEDURE — 1159F PR MEDICATION LIST DOCUMENTED IN MEDICAL RECORD: ICD-10-PCS | Mod: CPTII,,, | Performed by: NURSE PRACTITIONER

## 2023-03-27 PROCEDURE — 3008F BODY MASS INDEX DOCD: CPT | Mod: CPTII,,, | Performed by: NURSE PRACTITIONER

## 2023-03-27 PROCEDURE — 3078F DIAST BP <80 MM HG: CPT | Mod: CPTII,,, | Performed by: NURSE PRACTITIONER

## 2023-03-27 PROCEDURE — 99215 OFFICE O/P EST HI 40 MIN: CPT | Mod: PBBFAC | Performed by: NURSE PRACTITIONER

## 2023-03-27 PROCEDURE — 1159F MED LIST DOCD IN RCRD: CPT | Mod: CPTII,,, | Performed by: NURSE PRACTITIONER

## 2023-03-27 PROCEDURE — 3078F PR MOST RECENT DIASTOLIC BLOOD PRESSURE < 80 MM HG: ICD-10-PCS | Mod: CPTII,,, | Performed by: NURSE PRACTITIONER

## 2023-03-27 PROCEDURE — 1160F RVW MEDS BY RX/DR IN RCRD: CPT | Mod: CPTII,,, | Performed by: NURSE PRACTITIONER

## 2023-03-27 PROCEDURE — 3008F PR BODY MASS INDEX (BMI) DOCUMENTED: ICD-10-PCS | Mod: CPTII,,, | Performed by: NURSE PRACTITIONER

## 2023-03-27 RX ORDER — LEVOTHYROXINE SODIUM 150 UG/1
150 TABLET ORAL DAILY
Qty: 90 TABLET | Refills: 2 | Status: SHIPPED | OUTPATIENT
Start: 2023-03-27 | End: 2024-02-20 | Stop reason: SDUPTHER

## 2023-03-27 RX ORDER — FAMOTIDINE 40 MG/1
40 TABLET, FILM COATED ORAL DAILY
Qty: 30 TABLET | Refills: 2 | Status: SHIPPED | OUTPATIENT
Start: 2023-03-27 | End: 2024-02-20 | Stop reason: SDUPTHER

## 2023-03-27 RX ORDER — ATORVASTATIN CALCIUM 20 MG/1
20 TABLET, FILM COATED ORAL DAILY
Qty: 90 TABLET | Refills: 2 | Status: SHIPPED | OUTPATIENT
Start: 2023-03-27 | End: 2023-05-15 | Stop reason: SDUPTHER

## 2023-03-27 RX ORDER — CYCLOBENZAPRINE HCL 10 MG
10 TABLET ORAL 3 TIMES DAILY
COMMUNITY
Start: 2023-03-10 | End: 2024-02-01

## 2023-03-27 RX ORDER — BUSPIRONE HYDROCHLORIDE 15 MG/1
15 TABLET ORAL 2 TIMES DAILY
Qty: 180 TABLET | Refills: 2 | Status: SHIPPED | OUTPATIENT
Start: 2023-03-27 | End: 2024-02-20 | Stop reason: SDUPTHER

## 2023-03-27 RX ORDER — SEMAGLUTIDE 1.34 MG/ML
0.25 INJECTION, SOLUTION SUBCUTANEOUS
Qty: 2.25 ML | Refills: 2 | Status: SHIPPED | OUTPATIENT
Start: 2023-03-27 | End: 2023-08-04 | Stop reason: SDUPTHER

## 2023-03-27 RX ORDER — ERGOCALCIFEROL 1.25 MG/1
50000 CAPSULE ORAL
Qty: 12 CAPSULE | Refills: 2 | Status: SHIPPED | OUTPATIENT
Start: 2023-03-27 | End: 2024-02-20 | Stop reason: SDUPTHER

## 2023-03-27 RX ORDER — FLUOXETINE HYDROCHLORIDE 20 MG/1
20 CAPSULE ORAL DAILY
Qty: 90 CAPSULE | Refills: 2 | Status: SHIPPED | OUTPATIENT
Start: 2023-03-27 | End: 2024-02-20 | Stop reason: SDUPTHER

## 2023-03-27 RX ORDER — METFORMIN HYDROCHLORIDE 1000 MG/1
1000 TABLET ORAL 2 TIMES DAILY
Qty: 180 TABLET | Refills: 2 | Status: SHIPPED | OUTPATIENT
Start: 2023-03-27 | End: 2024-02-01 | Stop reason: SDUPTHER

## 2023-03-27 RX ORDER — SEMAGLUTIDE 1.34 MG/ML
0.25 INJECTION, SOLUTION SUBCUTANEOUS
COMMUNITY
End: 2023-03-27 | Stop reason: SDUPTHER

## 2023-03-27 NOTE — PROGRESS NOTES
Patient ID: [id number]     Chief Complaint: Follow-up (Need refills.), Results, and Nausea (C/O nausea/ )    HPI:     Zahira العراقي is a 47 y.o. female with diagnosis of HTN, HLD, Paroxysmal A-Fib, Bioprosthetic Aortic Valve Replacement (2012) with Revision Mechanical Aortic Replacement (10/2018), Hx of Endocarditis, DM2, Hypothyroidism, POWER, Obesity, Hx of COVID-19. Patient seen in clinic today for . Patient last seen in clinic on 12/01/2022.   At previous appt, patient started on Ozempic 0.25 mg weekly and continued on Metformin 1,000 mg bid. Patient states taking medication as prescribed, tolerating well. Previous A1c was 8.4, current A1c 6.2.   Patient continued on Atorvastatin 20 mg daily. Patient states taking medications as prescribed, tolerating well. Cholesterol level WNL, LDL at goal.   Today, patient states nausea. States worsened after started Ozempic. States doesn't want to stop Ozempic since it is working well for her. Patient denies abdominal pain, V/D/C. Mild weight loss noted which is expected with Ozempic.   Bilateral hand X-ray ordered due to chronic pain, completed on 10/26/2022, negative. Rheumatoid factors negative.   Prozac was increased to 20 mg daily, patient states improvement, states tolerating medication increase well. A referral to [facility] for anxiety, depression was ordered in 08/2022, patient canceled appt scheduled for 02/07/2023.   Patient denies any other acute complaints.        Patient followed by Oncology Clinic. Last appointment on 08/15/2022. No personal history of cancer but a family history of cancer. Patient presented via Telemedicine Visit (audio only) today for risk assessment, genetic counseling, and consideration for genetic testing. This patient was evaluated for hereditary risk of cancer and was found to be at an increased risk of having an inherited cancer predisposition gene mutation.  The option of genetic testing was explained in detail, including the possible  impact of this information on family members.  Since this patient wishes to proceed with testing an order will be placed online with [facility]. Informed consent will be obtained, lab drawn and sent to [facility].  Results will be expected 4 weeks from this time.  A follow-up appointment will be scheduled for results disclosure. Follow up appointment was scheduled for 10/03/2022, patient canceled, appointment not rescheduled.      Patient followed by [facility]. Last appointment on 12/01/2022.      Patient followed by Cardiology Clinic. Last appointment on 06/01/2022. Past medical history of bioprosthetic aortic valve replacement in 2012 S/T endocarditis with revisional mechanical aortic valve replacement in October 2018 presents for 4 month follow up and results of Echocardiogram. She completed an Echocardiogram on June 4, 2021 which revealed an EF of approximately 60%, normally functioning mechanical prosthetic aortic valve, mild AR, and mild TR. Of note, after her valve replacement surgery in 2018, she developed A Fib and remains on Coumadin. Today, she reports feeling well with only occasional dizziness that she thinks is due to significant menorrhagia. She is getting evaluated by women's clinic and will be seen by OBGYN in a few weeks. She otherwise denies SOB, chest pain on exertion and has been more active. 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 No other symptoms. Complaint with meds. Mechanical Aortic Valve Replacement S/T failure of bioprosthetic aortic valve: Mechanical aortic valve s/p replacement in Oct 2018; EF 60% per Echo June 2021 - normal mechanical aortic valve function, mild AR; Continue INR checks at Coumadin clinic; Atrial Fibrillation S/T post-op mechanical valve replacement; No recent episodes; Echo June 2021 - EF 60%; Continue Metoprolol Tartrate 25mg BID and Coumadin monitoring. HLD: Lipid panel improved at 91 in 11/20211 still not at  goal for her history of diabetes; Pt agreed to start lipitor 20mg, has been compliant; No recent FLP, will repeat today. Patient to follow up in 6 months. Patient has follow up appointment scheduled for 2023.      Patient followed by GYN clinic. Last appointment on 2022.  here for annual gyn exam. Reports hx of abnormal pap several years ago that required biopsy but no treatment. States last pap was a few years ago with outside provider. Her LMP was . Pt states over the last 6 months she is having a period every 2 weeks with cramping. Denies pain outside of period. Pt was apparently seen in ER for this and has an appt with gyn resident team later this month. She had TL for contraception. Pt has hx of HTN, DM, Afib. Pt also has hx of AVR and is currently on coumadin. Denies any dosage changes in the past year. Fly hx includes maternal and paternal GM with hx of breast cancer. Denies concerns for STIs. Pt is . Patient has follow up appointment on 2023.     Review of patient's allergies indicates:   Allergen Reactions    Lamotrigine Swelling    Ondansetron hcl Swelling    Ondansetron Rash    Ondansetron hcl (pf) Rash     Breast Cancer Screening: MMG negative on 2021, ordered on 2022  Cervical Cancer Screening: 10/13/2017, GYN appt 2023  Colorectal Cancer Screening: Cologuard ordered 10/26/2022  Diabetic Eye Exam: 2022  Diabetic Foot Exam: 2022  Lung Cancer Screening: N/A  Prostate Cancer Screening: N/A  AAA Screening: N/A  Osteoporosis Screening: deferred due to age  Medicare Wellness: N/A  Immunizations:   Immunization History   Administered Date(s) Administered    Tdap 2020     Past Surgical History:   Procedure Laterality Date    AORTIC VALVE REPLACEMENT N/A      SECTION      3     TUBAL LIGATION       family history includes Aneurysm in her mother; Brain cancer in her paternal grandmother; Breast cancer in her paternal grandmother;  Breast cancer (age of onset: 50) in her maternal grandmother; Diabetes in her mother; Lymphoma in her paternal grandfather; No Known Problems in her father; Stomach cancer (age of onset: 70) in her maternal grandmother; Stroke in her mother.    Social History     Socioeconomic History    Marital status:    Tobacco Use    Smoking status: Former    Smokeless tobacco: Never   Substance and Sexual Activity    Alcohol use: Not Currently    Drug use: Not Currently     Types: Marijuana     Comment: once in a blue moon    Sexual activity: Yes     Partners: Male     Birth control/protection: None   Social History Narrative    ** Merged History Encounter **         ** Merged History Encounter **          Current Outpatient Medications   Medication Instructions    albuterol (PROVENTIL/VENTOLIN HFA) 90 mcg/actuation inhaler 2 puffs, Inhalation, Every 6 hours PRN, Rescue    albuterol-ipratropium (DUO-NEB) 2.5 mg-0.5 mg/3 mL nebulizer solution Nebulization, Every 6 hours PRN    aspirin (ECOTRIN) 81 mg, Oral, Daily    atorvastatin (LIPITOR) 20 mg, Oral, Daily    busPIRone (BUSPAR) 15 mg, Oral, 2 times daily    cyclobenzaprine (FLEXERIL) 10 mg, Oral, 3 times daily    ergocalciferol (ERGOCALCIFEROL) 50,000 Units, Oral, Every 7 days    famotidine (PEPCID) 40 mg, Oral, Daily    ferrous sulfate (FEOSOL) 325 mg, Oral, 3 times daily    FLUoxetine 20 mg, Oral, Daily    fluticasone propionate (FLONASE) 50 mcg, Each Nostril, Daily    levothyroxine (SYNTHROID) 150 mcg, Oral, Daily    metFORMIN (GLUCOPHAGE) 1,000 mg, Oral, 2 times daily    metoprolol tartrate (LOPRESSOR) 25 mg, Oral, 2 times daily    OZEMPIC 0.25 mg, Subcutaneous, Every 7 days    traZODone (DESYREL) 100 mg, Oral, Nightly PRN    triamterene-hydrochlorothiazide 37.5-25 mg (DYAZIDE) 37.5-25 mg per capsule 1 capsule, Oral, Every morning    warfarin (COUMADIN) 6 MG tablet Take 1 tablet oral at bedtime.       Subjective:     Review of Systems   Constitutional: Negative.   "  HENT: Negative.     Eyes: Negative.    Respiratory: Negative.     Cardiovascular: Negative.    Gastrointestinal:  Positive for nausea.   Endocrine: Negative.    Genitourinary: Negative.    Musculoskeletal: Negative.    Skin: Negative.    Allergic/Immunologic: Negative.    Neurological: Negative.    Hematological: Negative.    Psychiatric/Behavioral: Negative.       Objective:     Visit Vitals  /75 (BP Location: Left arm, Patient Position: Sitting, BP Method: Large (Automatic))   Pulse 73   Temp 98.6 °F (37 °C) (Oral)   Resp 18   Ht 5' 4.02" (1.626 m)   Wt 90.7 kg (200 lb)   LMP 03/27/2023 (Exact Date)   BMI 34.31 kg/m²       Physical Exam  Vitals reviewed.   Constitutional:       Appearance: Normal appearance.   HENT:      Head: Normocephalic and atraumatic.      Mouth/Throat:      Mouth: Mucous membranes are moist.      Pharynx: Oropharynx is clear.   Eyes:      Extraocular Movements: Extraocular movements intact.      Conjunctiva/sclera: Conjunctivae normal.      Pupils: Pupils are equal, round, and reactive to light.   Cardiovascular:      Rate and Rhythm: Normal rate and regular rhythm.      Heart sounds: Normal heart sounds.   Pulmonary:      Effort: Pulmonary effort is normal.      Breath sounds: Normal breath sounds.   Abdominal:      General: Bowel sounds are normal.   Musculoskeletal:         General: Normal range of motion.      Cervical back: Normal range of motion.   Skin:     General: Skin is warm and dry.   Neurological:      Mental Status: She is alert and oriented to person, place, and time.   Psychiatric:         Mood and Affect: Mood normal.         Behavior: Behavior normal.       Labs Reviewed:     Hematology:  Lab Results   Component Value Date    WBC 8.3 12/01/2022    HGB 13.3 12/01/2022    HCT 40.4 12/01/2022     12/01/2022     Chemistry:  Lab Results   Component Value Date     03/27/2023    K 3.5 03/27/2023    CHLORIDE 101 03/27/2023    BUN 7.2 03/27/2023    CREATININE " 0.70 03/27/2023    EGFRNORACEVR >60 03/27/2023    GLUCOSE 145 (H) 03/27/2023    CALCIUM 9.4 03/27/2023    ALKPHOS 48 03/27/2023    LABPROT 7.0 03/27/2023    ALBUMIN 4.0 03/27/2023    BILIDIR 0.2 05/05/2022    IBILI 0.40 05/05/2022    AST 35 (H) 03/27/2023    ALT 35 03/27/2023    MG 1.55 (L) 05/19/2021    PHOS 3.6 10/19/2018    OBECTCQV15BR 26.0 (L) 12/01/2022     Lab Results   Component Value Date    HGBA1C 6.2 03/27/2023     Lipid Panel:  Lab Results   Component Value Date    CHOL 124 12/01/2022    HDL 45 12/01/2022    LDL 59.00 12/01/2022    TRIG 98 12/01/2022    TOTALCHOLEST 3 12/01/2022     Thyroid:  Lab Results   Component Value Date    TSH 1.3136 06/01/2022    T3FREE 2.45 06/27/2019     Urine:  Lab Results   Component Value Date    COLORUA Light-Yellow 12/01/2022    APPEARANCEUA Clear 12/01/2022    SGUA 1.013 12/01/2022    PHUA 5.5 12/01/2022    PROTEINUA Negative 12/01/2022    GLUCOSEUA Normal 12/01/2022    KETONESUA Negative 12/01/2022    BLOODUA Negative 12/01/2022    NITRITESUA Negative 12/01/2022    LEUKOCYTESUR Negative 12/01/2022    RBCUA 0-5 12/01/2022    WBCUA 0-5 12/01/2022    BACTERIA Trace (A) 12/01/2022    SQEPUA Moderate (A) 12/01/2022    HYALINECASTS None Seen 12/01/2022    CREATRANDUR 81.1 12/01/2022        Assessment:       ICD-10-CM ICD-9-CM   1. Type 2 diabetes mellitus with hyperglycemia, without long-term current use of insulin  E11.65 250.00     790.29   2. Mixed hyperlipidemia  E78.2 272.2   3. Primary hypertension  I10 401.9   4. Paroxysmal atrial fibrillation  I48.0 427.31   5. Hypothyroidism, unspecified type  E03.9 244.9   6. Vitamin D deficiency  E55.9 268.9   7. Class 2 severe obesity due to excess calories with serious comorbidity and body mass index (BMI) of 36.0 to 36.9 in adult  E66.01 278.01    Z68.36 V85.36   8. Anxiety  F41.9 300.00   9. Nausea  R11.0 787.02        Plan:     1. Type 2 diabetes mellitus with hyperglycemia, without long-term current use of insulin  Continue  Metformin 1,000 mg bid, Ozempic 0.25 mg weekly  Encouraged home CBG monitoring.  Hypoglycemic episodes: denies  Body mass index is 34.31 kg/m².  Hemoglobin A1c   Date Value Ref Range Status   03/27/2023 6.2 <=7.0 % Final     Urine Creatinine   Date Value Ref Range Status   12/01/2022 81.1 47.0 - 110.0 mg/dL Final   Urine Microalbumin: 48.7  On Atorvastatin  Weight Loss Encouraged  ADA Diet  - CBC Auto Differential; Future  - Comprehensive Metabolic Panel; Future  - Hemoglobin A1C; Future  - Urinalysis; Future  - Microalbumin/Creatinine Ratio, Urine; Future  - Urinalysis    2. Mixed hyperlipidemia  Continue Atorvastatin 20 mg daily  Weight loss encouraged  Low fat/high fiber diet  Increase physical activity  Cholesterol Total   Date Value Ref Range Status   12/01/2022 124 <=200 mg/dL Final     HDL Cholesterol   Date Value Ref Range Status   12/01/2022 45 35 - 60 mg/dL Final     Triglyceride   Date Value Ref Range Status   12/01/2022 98 37 - 140 mg/dL Final     LDL Cholesterol   Date Value Ref Range Status   12/01/2022 59.00 50.00 - 140.00 mg/dL Final   - Lipid Panel; Future    3. Primary hypertension  Vitals:    03/27/23 1346   BP: 107/75   Pulse: 73   Resp: 18   Temp: 98.6 °F (37 °C)      Urine Creatinine   Date Value Ref Range Status   12/01/2022 81.1 47.0 - 110.0 mg/dL Final   Urine Microalbumin: 48.7  EKG: none noted  Continue Metoprolol Tart 25 mg bid, Triamterene-HCTZ 37.5-25 mg daily  DASH diet  Encouraged home blood pressure monitoring    4. Paroxysmal atrial fibrillation  Followed by Cardiology  Continue Metoprolol Tart 25 mg bid  HR: 73    5. Hypothyroidism, unspecified type  TSH level: 1.3  Continue Levothyroxine 150 mcg daily  - TSH; Future    6. Vitamin D deficiency  Vitamin D level: 26.0  Continue Vitamin D 50,000 units weekly    7. Class 2 severe obesity due to excess calories with serious comorbidity and body mass index (BMI) of 36.0 to 36.9 in adult  Body mass index is 34.31 kg/m².  Thyroid  Stimulating Hormone   Date Value Ref Range Status   06/01/2022 1.3136 0.3500 - 4.9400 uIU/mL Final     Vit D 25 OH   Date Value Ref Range Status   12/01/2022 26.0 (L) 30.0 - 80.0 ng/mL Final     Hemoglobin A1c   Date Value Ref Range Status   03/27/2023 6.2 <=7.0 % Final   Sleep Study: none noted  Weight Loss Encouraged  Increase Physical Activity  - Vitamin D; Future    8. Anxiety  Continue Buspar 15 mg bid, Fluoxetine 20 mg daily    9.0 Nausea  Start Pepcid 40 mg daily prn nausea      Follow up in about 6 months (around 9/27/2023) for Labs. In addition to their scheduled follow up, the patient has also been instructed to follow up on as needed basis.     Keren Owens, JCP

## 2023-05-09 RX ORDER — METOPROLOL TARTRATE 25 MG/1
25 TABLET, FILM COATED ORAL 2 TIMES DAILY
Qty: 60 TABLET | Refills: 6 | Status: SHIPPED | OUTPATIENT
Start: 2023-05-09 | End: 2023-05-15 | Stop reason: SDUPTHER

## 2023-05-09 RX ORDER — TRIAMTERENE AND HYDROCHLOROTHIAZIDE 37.5; 25 MG/1; MG/1
1 CAPSULE ORAL EVERY MORNING
Qty: 30 CAPSULE | Refills: 6 | Status: SHIPPED | OUTPATIENT
Start: 2023-05-09 | End: 2023-05-15 | Stop reason: SDUPTHER

## 2023-05-15 ENCOUNTER — OFFICE VISIT (OUTPATIENT)
Dept: CARDIOLOGY | Facility: CLINIC | Age: 48
End: 2023-05-15
Payer: MEDICAID

## 2023-05-15 VITALS
OXYGEN SATURATION: 100 % | TEMPERATURE: 98 F | RESPIRATION RATE: 20 BRPM | SYSTOLIC BLOOD PRESSURE: 118 MMHG | WEIGHT: 193.81 LBS | DIASTOLIC BLOOD PRESSURE: 74 MMHG | HEART RATE: 88 BPM | BODY MASS INDEX: 33.09 KG/M2 | HEIGHT: 64 IN

## 2023-05-15 DIAGNOSIS — I48.0 PAROXYSMAL ATRIAL FIBRILLATION: Chronic | ICD-10-CM

## 2023-05-15 DIAGNOSIS — I34.1 MITRAL VALVE PROLAPSE: ICD-10-CM

## 2023-05-15 DIAGNOSIS — Z86.79 HISTORY OF ENDOCARDITIS: ICD-10-CM

## 2023-05-15 DIAGNOSIS — I35.2 AORTIC VALVE STENOSIS WITH INSUFFICIENCY, ETIOLOGY OF CARDIAC VALVE DISEASE UNSPECIFIED: ICD-10-CM

## 2023-05-15 DIAGNOSIS — Z95.2 H/O MECHANICAL AORTIC VALVE REPLACEMENT: Primary | Chronic | ICD-10-CM

## 2023-05-15 DIAGNOSIS — E66.01 CLASS 2 SEVERE OBESITY DUE TO EXCESS CALORIES WITH SERIOUS COMORBIDITY AND BODY MASS INDEX (BMI) OF 36.0 TO 36.9 IN ADULT: Chronic | ICD-10-CM

## 2023-05-15 DIAGNOSIS — E78.2 MIXED HYPERLIPIDEMIA: Chronic | ICD-10-CM

## 2023-05-15 DIAGNOSIS — I10 PRIMARY HYPERTENSION: Chronic | ICD-10-CM

## 2023-05-15 PROCEDURE — 36415 COLL VENOUS BLD VENIPUNCTURE: CPT | Performed by: STUDENT IN AN ORGANIZED HEALTH CARE EDUCATION/TRAINING PROGRAM

## 2023-05-15 PROCEDURE — 99214 OFFICE O/P EST MOD 30 MIN: CPT | Mod: PBBFAC | Performed by: STUDENT IN AN ORGANIZED HEALTH CARE EDUCATION/TRAINING PROGRAM

## 2023-05-15 PROCEDURE — 93005 ELECTROCARDIOGRAM TRACING: CPT

## 2023-05-15 PROCEDURE — 85610 PROTHROMBIN TIME: CPT | Performed by: STUDENT IN AN ORGANIZED HEALTH CARE EDUCATION/TRAINING PROGRAM

## 2023-05-15 RX ORDER — METOPROLOL TARTRATE 25 MG/1
25 TABLET, FILM COATED ORAL 2 TIMES DAILY
Qty: 60 TABLET | Refills: 6 | Status: SHIPPED | OUTPATIENT
Start: 2023-05-15 | End: 2024-02-02

## 2023-05-15 RX ORDER — ATORVASTATIN CALCIUM 20 MG/1
20 TABLET, FILM COATED ORAL DAILY
Qty: 90 TABLET | Refills: 2 | Status: SHIPPED | OUTPATIENT
Start: 2023-05-15 | End: 2024-01-30 | Stop reason: SDUPTHER

## 2023-05-15 RX ORDER — TRIAMTERENE AND HYDROCHLOROTHIAZIDE 37.5; 25 MG/1; MG/1
1 CAPSULE ORAL EVERY MORNING
Qty: 30 CAPSULE | Refills: 6 | Status: SHIPPED | OUTPATIENT
Start: 2023-05-15 | End: 2024-01-30 | Stop reason: SDUPTHER

## 2023-05-15 RX ORDER — WARFARIN 6 MG/1
TABLET ORAL
Qty: 30 TABLET | Refills: 2 | Status: SHIPPED | OUTPATIENT
Start: 2023-05-15 | End: 2024-01-30 | Stop reason: SDUPTHER

## 2023-05-18 ENCOUNTER — PATIENT MESSAGE (OUTPATIENT)
Dept: ADMINISTRATIVE | Facility: HOSPITAL | Age: 48
End: 2023-05-18
Payer: MEDICAID

## 2023-06-01 DIAGNOSIS — Z12.31 ENCOUNTER FOR SCREENING MAMMOGRAM FOR MALIGNANT NEOPLASM OF BREAST: Primary | ICD-10-CM

## 2023-06-12 ENCOUNTER — HOSPITAL ENCOUNTER (OUTPATIENT)
Dept: CARDIOLOGY | Facility: HOSPITAL | Age: 48
Discharge: HOME | End: 2023-06-12
Attending: STUDENT IN AN ORGANIZED HEALTH CARE EDUCATION/TRAINING PROGRAM
Payer: MEDICAID

## 2023-06-12 VITALS
WEIGHT: 193 LBS | HEIGHT: 64 IN | BODY MASS INDEX: 32.95 KG/M2 | DIASTOLIC BLOOD PRESSURE: 75 MMHG | SYSTOLIC BLOOD PRESSURE: 115 MMHG

## 2023-06-12 DIAGNOSIS — I34.1 MITRAL VALVE PROLAPSE: ICD-10-CM

## 2023-06-12 DIAGNOSIS — Z95.2 H/O MECHANICAL AORTIC VALVE REPLACEMENT: ICD-10-CM

## 2023-06-12 DIAGNOSIS — I35.2 AORTIC VALVE STENOSIS WITH INSUFFICIENCY, ETIOLOGY OF CARDIAC VALVE DISEASE UNSPECIFIED: ICD-10-CM

## 2023-06-12 LAB
AV INDEX (PROSTH): 0.52
AV MEAN GRADIENT: 13 MMHG
AV PEAK GRADIENT: 20 MMHG
AV VALVE AREA: 1.78 CM2
AV VELOCITY RATIO: 0.49
BSA FOR ECHO PROCEDURE: 1.99 M2
CV ECHO LV RWT: 0.42 CM
DOP CALC AO PEAK VEL: 2.24 M/S
DOP CALC AO VTI: 48.5 CM
DOP CALC LVOT AREA: 3.4 CM2
DOP CALC LVOT DIAMETER: 2.09 CM
DOP CALC LVOT PEAK VEL: 1.1 M/S
DOP CALC LVOT STROKE VOLUME: 86.41 CM3
DOP CALC MV VTI: 27.2 CM
DOP CALCLVOT PEAK VEL VTI: 25.2 CM
E WAVE DECELERATION TIME: 298.13 MSEC
E/A RATIO: 1.19
E/E' RATIO: 7.82 M/S
ECHO LV POSTERIOR WALL: 0.91 CM (ref 0.6–1.1)
EJECTION FRACTION: 60 %
FRACTIONAL SHORTENING: 36 % (ref 28–44)
INTERVENTRICULAR SEPTUM: 0.98 CM (ref 0.6–1.1)
LEFT ATRIUM SIZE: 3.97 CM
LEFT INTERNAL DIMENSION IN SYSTOLE: 2.79 CM (ref 2.1–4)
LEFT VENTRICLE DIASTOLIC VOLUME INDEX: 44.9 ML/M2
LEFT VENTRICLE DIASTOLIC VOLUME: 86.65 ML
LEFT VENTRICLE MASS INDEX: 70 G/M2
LEFT VENTRICLE SYSTOLIC VOLUME INDEX: 15.2 ML/M2
LEFT VENTRICLE SYSTOLIC VOLUME: 29.4 ML
LEFT VENTRICULAR INTERNAL DIMENSION IN DIASTOLE: 4.38 CM (ref 3.5–6)
LEFT VENTRICULAR MASS: 135.77 G
LV LATERAL E/E' RATIO: 6.14 M/S
LV SEPTAL E/E' RATIO: 10.75 M/S
LVOT MG: 2.67 MMHG
LVOT MV: 0.76 CM/S
MV MEAN GRADIENT: 1 MMHG
MV PEAK A VEL: 0.72 M/S
MV PEAK E VEL: 0.86 M/S
MV PEAK GRADIENT: 3 MMHG
MV STENOSIS PRESSURE HALF TIME: 86.46 MS
MV VALVE AREA BY CONTINUITY EQUATION: 3.18 CM2
MV VALVE AREA P 1/2 METHOD: 2.54 CM2
OHS LV EJECTION FRACTION SIMPSONS BIPLANE MOD: 7 %
PISA TR MAX VEL: 2.93 M/S
RA PRESSURE: 3 MMHG
RIGHT VENTRICULAR END-DIASTOLIC DIMENSION: 2.45 CM
TDI LATERAL: 0.14 M/S
TDI SEPTAL: 0.08 M/S
TDI: 0.11 M/S
TR MAX PG: 34 MMHG
TV REST PULMONARY ARTERY PRESSURE: 37 MMHG

## 2023-06-12 PROCEDURE — 93306 TTE W/DOPPLER COMPLETE: CPT

## 2023-07-10 ENCOUNTER — PATIENT MESSAGE (OUTPATIENT)
Dept: ADMINISTRATIVE | Facility: HOSPITAL | Age: 48
End: 2023-07-10
Payer: MEDICAID

## 2023-08-03 RX ORDER — SEMAGLUTIDE 1.34 MG/ML
0.25 INJECTION, SOLUTION SUBCUTANEOUS
Qty: 2.25 ML | Refills: 2 | Status: CANCELLED | OUTPATIENT
Start: 2023-08-03

## 2023-08-04 RX ORDER — SEMAGLUTIDE 1.34 MG/ML
0.25 INJECTION, SOLUTION SUBCUTANEOUS
Qty: 2.25 ML | Refills: 2 | Status: CANCELLED | OUTPATIENT
Start: 2023-08-04

## 2023-08-08 RX ORDER — SEMAGLUTIDE 1.34 MG/ML
0.25 INJECTION, SOLUTION SUBCUTANEOUS
Qty: 2.25 ML | Refills: 2 | Status: SHIPPED | OUTPATIENT
Start: 2023-08-08 | End: 2024-02-01 | Stop reason: SDUPTHER

## 2023-08-16 LAB
INR PPP: 2.1
PROTHROMBIN TIME: 23 SECONDS (ref 11.4–14)

## 2023-10-16 ENCOUNTER — PATIENT MESSAGE (OUTPATIENT)
Dept: ADMINISTRATIVE | Facility: HOSPITAL | Age: 48
End: 2023-10-16
Payer: MEDICAID

## 2023-11-30 RX ORDER — FLUOXETINE HYDROCHLORIDE 20 MG/1
20 CAPSULE ORAL DAILY
Qty: 90 CAPSULE | Refills: 2 | OUTPATIENT
Start: 2023-11-30

## 2024-01-30 ENCOUNTER — TELEPHONE (OUTPATIENT)
Dept: CARDIOLOGY | Facility: CLINIC | Age: 49
End: 2024-01-30

## 2024-01-30 DIAGNOSIS — I48.0 PAROXYSMAL ATRIAL FIBRILLATION: Chronic | ICD-10-CM

## 2024-01-30 DIAGNOSIS — Z95.2 H/O MECHANICAL AORTIC VALVE REPLACEMENT: Chronic | ICD-10-CM

## 2024-01-30 DIAGNOSIS — I10 PRIMARY HYPERTENSION: Chronic | ICD-10-CM

## 2024-01-30 DIAGNOSIS — I35.2 AORTIC VALVE STENOSIS WITH INSUFFICIENCY, ETIOLOGY OF CARDIAC VALVE DISEASE UNSPECIFIED: ICD-10-CM

## 2024-01-30 DIAGNOSIS — Z95.2 H/O MECHANICAL AORTIC VALVE REPLACEMENT: ICD-10-CM

## 2024-01-30 RX ORDER — WARFARIN 6 MG/1
TABLET ORAL
Qty: 90 TABLET | Refills: 3 | Status: SHIPPED | OUTPATIENT
Start: 2024-01-30 | End: 2024-02-01 | Stop reason: SDUPTHER

## 2024-01-30 RX ORDER — TRIAMTERENE AND HYDROCHLOROTHIAZIDE 37.5; 25 MG/1; MG/1
1 CAPSULE ORAL EVERY MORNING
Qty: 90 CAPSULE | Refills: 3 | Status: SHIPPED | OUTPATIENT
Start: 2024-01-30

## 2024-01-30 RX ORDER — ATORVASTATIN CALCIUM 20 MG/1
20 TABLET, FILM COATED ORAL DAILY
Qty: 90 TABLET | Refills: 3 | Status: SHIPPED | OUTPATIENT
Start: 2024-01-30

## 2024-01-30 NOTE — TELEPHONE ENCOUNTER
Informed patient of Coumadin clinic referral, appointment scheduled in cardiology clinic on 2/1/23 at 1030 am. Orders placed for labs. ER precautions given to patient. Informed patient if she needed medication prior to appointment and labs she will need to report to urgent care or ER. Patient verbalized understanding.    Darrius Richter MD Miller, Rebecca, RN  Phone Number: [phone]     This patient has requested refills on medication(s).  The patient being on medications that affect:  blood count and kidney function    The patient needs the following labs checked:  CBC INR and BMP          Previous Messages       ----- Message -----  From: Renetta Manzanares RN  Sent: 1/30/2024   8:44 AM CST  To: Darrius Richter MD  Subject: FW: Medication Renewal Request                  Patient missed last cardiology appointment. LCV 5/15/23. Patient was instructed to return to clinic in 6 months. At the visit patient's warfarin was refilled and INR checked. Patient was not referred to coumadin clinic. It appears the the system to refer to [facility] coumadin clinic was in place but the old [facility] coumadin clinic was dissolved. Patient is requesting refills of coumadin, and other cardiac meds. A message was sent to patient to call and schedule a follow up cardiology appointment.  Please advise. I will fax coumadin referral today.

## 2024-02-01 ENCOUNTER — OFFICE VISIT (OUTPATIENT)
Dept: CARDIOLOGY | Facility: CLINIC | Age: 49
End: 2024-02-01

## 2024-02-01 VITALS
WEIGHT: 191.56 LBS | OXYGEN SATURATION: 98 % | SYSTOLIC BLOOD PRESSURE: 135 MMHG | BODY MASS INDEX: 32.7 KG/M2 | HEIGHT: 64 IN | HEART RATE: 77 BPM | DIASTOLIC BLOOD PRESSURE: 85 MMHG | RESPIRATION RATE: 20 BRPM | TEMPERATURE: 98 F

## 2024-02-01 DIAGNOSIS — E11.9 TYPE 2 DIABETES MELLITUS WITHOUT COMPLICATION, WITHOUT LONG-TERM CURRENT USE OF INSULIN: ICD-10-CM

## 2024-02-01 DIAGNOSIS — I97.89 POSTOPERATIVE ATRIAL FIBRILLATION: ICD-10-CM

## 2024-02-01 DIAGNOSIS — E66.9 CLASS 1 OBESITY: ICD-10-CM

## 2024-02-01 DIAGNOSIS — I10 PRIMARY HYPERTENSION: Chronic | ICD-10-CM

## 2024-02-01 DIAGNOSIS — I48.91 POSTOPERATIVE ATRIAL FIBRILLATION: ICD-10-CM

## 2024-02-01 DIAGNOSIS — Z95.2 H/O MECHANICAL AORTIC VALVE REPLACEMENT: Chronic | ICD-10-CM

## 2024-02-01 DIAGNOSIS — Z86.79 HISTORY OF ENDOCARDITIS: ICD-10-CM

## 2024-02-01 DIAGNOSIS — E78.5 HYPERLIPIDEMIA, UNSPECIFIED HYPERLIPIDEMIA TYPE: Primary | ICD-10-CM

## 2024-02-01 PROCEDURE — 99215 OFFICE O/P EST HI 40 MIN: CPT | Mod: PBBFAC | Performed by: INTERNAL MEDICINE

## 2024-02-01 RX ORDER — SEMAGLUTIDE 1.34 MG/ML
0.25 INJECTION, SOLUTION SUBCUTANEOUS
Qty: 2.25 ML | Refills: 2 | Status: SHIPPED | OUTPATIENT
Start: 2024-02-01 | End: 2024-02-20 | Stop reason: SDUPTHER

## 2024-02-01 RX ORDER — WARFARIN 6 MG/1
TABLET ORAL
Qty: 90 TABLET | Refills: 3 | Status: SHIPPED | OUTPATIENT
Start: 2024-02-01

## 2024-02-01 RX ORDER — METFORMIN HYDROCHLORIDE 1000 MG/1
1000 TABLET ORAL 2 TIMES DAILY
Qty: 180 TABLET | Refills: 2 | Status: CANCELLED | OUTPATIENT
Start: 2024-02-01

## 2024-02-01 RX ORDER — METFORMIN HYDROCHLORIDE 1000 MG/1
1000 TABLET ORAL 2 TIMES DAILY
Qty: 180 TABLET | Refills: 3 | Status: SHIPPED | OUTPATIENT
Start: 2024-02-01 | End: 2024-02-20 | Stop reason: SDUPTHER

## 2024-02-01 RX ORDER — METOPROLOL SUCCINATE 50 MG/1
50 TABLET, EXTENDED RELEASE ORAL DAILY
Qty: 90 TABLET | Refills: 3 | Status: SHIPPED | OUTPATIENT
Start: 2024-02-01 | End: 2025-01-31

## 2024-02-01 RX ORDER — WARFARIN SODIUM 5 MG/1
5 TABLET ORAL DAILY
Qty: 4 TABLET | Refills: 0 | Status: SHIPPED | OUTPATIENT
Start: 2024-02-01 | End: 2025-01-31

## 2024-02-01 NOTE — PROGRESS NOTES
Chief Complaint   Patient presents with    f/u denies chest pain or sob since LV no questions          48 year-old  female with history of bioprosthetic aortic valve replacement in 2012 2/2 endocarditis with revisional mechanical aortic valve replacement in October 2018 presents for follow up.    Patient was last seen in May 2023 and was lost to follow-up since then.  At the time the Coumadin Clinic at [facility] had closed and there was no clear plan of where patients should go.  Later it was established that those patients need to go to the Coumadin Clinic at [facility] however the patient has not followed up since.  She reports running out of her Coumadin 2 days ago.  She had her INR checked this morning at the hospital.  She is feeling well otherwise and denies any cardiovascular complaints whatsoever.  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  No exertional chest pain, shortness of breath, fatigue.  Patient also denies orthopnea, PND, lower extremity edema, palpitations, dizziness, lightheadedness or syncope.     Results for orders placed during the hospital encounter of 06/12/23    Echo    Interpretation Summary  · Normal right ventricular size with normal right ventricular systolic function.  · There is a bioprosthetic aortic valve present.  · The aortic valve mean gradient is 13 mmHg with a dimensionless index of 0.52.  · Normal central venous pressure (3 mmHg).  · The estimated PA systolic pressure is 37 mmHg.  · The left ventricle is normal in size with normal systolic function.  · The estimated ejection fraction is 60%.  · Normal left ventricular diastolic function.  · There is mild pulmonary hypertension.    Cardiac testing  Echocardiogram June 4, 2021:  Left ventricular ejection fraction is measured at approximately 60%.  Structurally normal mitral valve.  Trace mitral regurgitation.  Normally functioning mechanical prosthetic valve in aortic position.  Mild aortic regurgitation  present.  Peak velocity across the aortic valve is 2.6m/sec. Peak gradient is 27.27 mmHg. Mean gradient is 13.54mmHg.  Tricuspid valve is structurally normal.  There is mild tricuspid regurgitation with RVSP estimated 49 mmHg.  Mitral valve is structurally normal.  Moderately dilated left atrium.  Mildly dilated right atrium.  Right ventricle global systolic function is mildly reduced.  IVC normal.    Echocardiogram March 2020:  EF 55 to 65%. Normal left ventricle diastolic function.  Mild tricuspid regurgitation  Mild pulmonary hypertension present  Aortic valve: There is a mechanical valve present  Prosthetic aortic valve function is normal.      TTE (Nov 2018):  Left ventricular ejection fraction is measured at approximately 65%.  Right ventricle global systolic function is mildly reduced.  Normal size right ventricle cavity.  Mechanical prosthetic valve present in the aortic position.  Aortic valve mean gradient of 12.85 mm Hg.      Review of Systems   Constitutional: negative for fever,chills, sweats, weakness, fatigue, decreased activity   Eye: negative for blurry vision, vision change  ENMT: negative for sore throat, nasal congestion  Respiratory: negative for shortness of breath, cough, wheezing  Cardiovascular: negative for chest pain, dyspnea on exertion, orthopnea, PND, lower extremity edema, palpitations, claudication  Gastrointestinal: negative for nausea, vomiting, abdominal pain, constipation, diarrhea, blood in stool  Genitourinary: negative for hematuria, nocturia, dysuria  Endocrine: negative for abnormal thirst, temperature  Musculoskeletal: negative for back pain, joint pain  Integumentary: negative for abnormal mole  Neurologic: negative for weakness, numbness, headaches, shooting pain  Hematology: negative for easy bruising, easy bleeding  Allergy: negative for watery eyes, sneezing  Psychiatric: stress, negative for loss of interest, anxiety    Physical Exam Vitals & Measurements     Vitals:     02/01/24 1008   BP: 135/85   Pulse: 77   Resp: 20   Temp: 98.2 °F (36.8 °C)         General: alert and oriented/no acute distress  Eye: EOMI/normal conjunctiva/no xanthelasma  HENT: normocephalic/moist oral mucosa  Neck: supple/nontender/no carotid bruit  Respiratory: lungs CTA/nonlabored respirations/BS equal/symmetrical expansion/no chest wall tenderness  Cardiovascular: normal rate/normal rhythm/metallic click heard, 2/6 systolic murmur/normal peripheral perfusion/no edema/no JVD  Gastrointestinal: soft/nontender  Musculoskeletal: normal ROM  Integumentary: warm/dry/pink/intact  Neurologic: alert/oriented/normal sensory/no focal deficits  Psychiatric: cooperative/appropriate mood and affect/normal judgment       Current Outpatient Medications:     albuterol (PROVENTIL/VENTOLIN HFA) 90 mcg/actuation inhaler, Inhale 2 puffs into the lungs every 6 (six) hours as needed for Shortness of Breath. Rescue, Disp: 18 g, Rfl: 2    albuterol-ipratropium (DUO-NEB) 2.5 mg-0.5 mg/3 mL nebulizer solution, Take by nebulization every 6 (six) hours as needed., Disp: , Rfl:     aspirin (ECOTRIN) 81 MG EC tablet, Take 81 mg by mouth once daily., Disp: , Rfl:     atorvastatin (LIPITOR) 20 MG tablet, Take 1 tablet (20 mg total) by mouth once daily., Disp: 90 tablet, Rfl: 3    busPIRone (BUSPAR) 15 MG tablet, Take 1 tablet (15 mg total) by mouth 2 (two) times daily., Disp: 180 tablet, Rfl: 2    ergocalciferol (ERGOCALCIFEROL) 50,000 unit Cap, Take 1 capsule (50,000 Units total) by mouth every 7 days., Disp: 12 capsule, Rfl: 2    famotidine (PEPCID) 40 MG tablet, Take 1 tablet (40 mg total) by mouth once daily., Disp: 30 tablet, Rfl: 2    ferrous sulfate (FEOSOL) 325 mg (65 mg iron) Tab tablet, Take 325 mg by mouth 3 (three) times daily., Disp: , Rfl:     FLUoxetine 20 MG capsule, Take 1 capsule (20 mg total) by mouth once daily., Disp: 90 capsule, Rfl: 2    fluticasone propionate (FLONASE) 50 mcg/actuation nasal spray, 1 spray (50  mcg total) by Each Nostril route once daily. (Patient taking differently: 1 spray by Each Nostril route as needed.), Disp: 15.8 mL, Rfl: 2    levothyroxine (SYNTHROID) 150 MCG tablet, Take 1 tablet (150 mcg total) by mouth once daily., Disp: 90 tablet, Rfl: 2    metFORMIN (GLUCOPHAGE) 1000 MG tablet, Take 1 tablet (1,000 mg total) by mouth 2 (two) times daily., Disp: 180 tablet, Rfl: 2    metoprolol tartrate (LOPRESSOR) 25 MG tablet, Take 1 tablet (25 mg total) by mouth 2 (two) times daily., Disp: 60 tablet, Rfl: 6    semaglutide (OZEMPIC) 0.25 mg or 0.5 mg(2 mg/1.5 mL) pen injector, Inject 0.25 mg into the skin every 7 days., Disp: 2.25 mL, Rfl: 2    triamterene-hydrochlorothiazide 37.5-25 mg (DYAZIDE) 37.5-25 mg per capsule, Take 1 capsule by mouth every morning., Disp: 90 capsule, Rfl: 3    warfarin (COUMADIN) 6 MG tablet, Take 1 tablet oral at bedtime., Disp: 90 tablet, Rfl: 3    cyclobenzaprine (FLEXERIL) 10 MG tablet, Take 10 mg by mouth 3 (three) times daily., Disp: , Rfl:     traZODone (DESYREL) 100 MG tablet, Take 1 tablet (100 mg total) by mouth nightly as needed for Insomnia., Disp: 30 tablet, Rfl: 2      Assessment/Plan   Mechanical Aortic Valve Replacement 2/2 failure of bioprosthetic aortic valve  Mechanical aortic valve s/p replacement in Oct 2018  Hx of endocarditis  Postop AFib-no recurrence  I personally reviewed the echo from June 2023 and it revealed preserved EF and normally functioning mechanical aortic valve.   INR today 1.1.  Patient has run out of her Coumadin for the last 2 days.    Will prescribe Coumadin 10 mg to be taken over the next 2 days followed by her usual Coumadin 6 mg dose.    Will repeat INR on Monday 02/05/2024   Paperwork to establish care at Coumadin Clinic at [facility] is signed  Continue ASA    DM  Last A1C -6.2 In 3/2023  On ozempic and metformin   Continue management per PCP     HLP  LDL goal <70 given DM    today 2/1/2024  Pt reports she has not been consistently  taking statin and would like to try to take it better first before we increase the dose  Will repeat lipid panel in 3 months    HTN  BP slightly above goal today at 135/85  Pt restarted BP med yesterday  Will continue to monitor, con dyazide 37.5-25mg daily    Obesity  Patient has lost at least 30 pounds recently and is on ozempic      RTC in 6 months

## 2024-02-01 NOTE — PATIENT INSTRUCTIONS
We are faxing your enrollment form to [facility] coumadin clinic.  They will be contacting you for an appointment.    Dr. Sanderson has instructed you to take coumadin 10mg x 2 days then resume 6mg daily and repeat INR at [facility] lab this Monday unless you are seen by [facility] coumadin clinic prior to then.

## 2024-02-02 PROBLEM — I48.91 POSTOPERATIVE ATRIAL FIBRILLATION: Status: ACTIVE | Noted: 2022-06-01

## 2024-02-02 PROBLEM — I35.2 AORTIC VALVE STENOSIS WITH INSUFFICIENCY: Status: RESOLVED | Noted: 2022-08-01 | Resolved: 2024-02-02

## 2024-02-02 PROBLEM — E66.811 CLASS 1 OBESITY: Status: ACTIVE | Noted: 2024-02-02

## 2024-02-02 PROBLEM — E66.01 CLASS 2 SEVERE OBESITY DUE TO EXCESS CALORIES WITH SERIOUS COMORBIDITY AND BODY MASS INDEX (BMI) OF 36.0 TO 36.9 IN ADULT: Chronic | Status: RESOLVED | Noted: 2022-06-01 | Resolved: 2024-02-02

## 2024-02-02 PROBLEM — I97.89 POSTOPERATIVE ATRIAL FIBRILLATION: Status: ACTIVE | Noted: 2022-06-01

## 2024-02-02 PROBLEM — I48.0 PAROXYSMAL ATRIAL FIBRILLATION: Chronic | Status: RESOLVED | Noted: 2022-06-01 | Resolved: 2024-02-02

## 2024-02-02 PROBLEM — E66.9 CLASS 1 OBESITY: Status: ACTIVE | Noted: 2024-02-02

## 2024-02-02 PROBLEM — E66.812 CLASS 2 SEVERE OBESITY DUE TO EXCESS CALORIES WITH SERIOUS COMORBIDITY AND BODY MASS INDEX (BMI) OF 36.0 TO 36.9 IN ADULT: Chronic | Status: RESOLVED | Noted: 2022-06-01 | Resolved: 2024-02-02

## 2024-02-05 ENCOUNTER — LAB VISIT (OUTPATIENT)
Dept: LAB | Facility: HOSPITAL | Age: 49
End: 2024-02-05
Attending: INTERNAL MEDICINE

## 2024-02-05 DIAGNOSIS — Z95.2 H/O MECHANICAL AORTIC VALVE REPLACEMENT: Chronic | ICD-10-CM

## 2024-02-05 LAB
INR PPP: 1.8
PROTHROMBIN TIME: 20.5 SECONDS (ref 11.4–14)

## 2024-02-05 PROCEDURE — 36415 COLL VENOUS BLD VENIPUNCTURE: CPT

## 2024-02-05 PROCEDURE — 85610 PROTHROMBIN TIME: CPT

## 2024-02-06 ENCOUNTER — TELEPHONE (OUTPATIENT)
Dept: CARDIOLOGY | Facility: CLINIC | Age: 49
End: 2024-02-06

## 2024-02-06 DIAGNOSIS — Z95.2 H/O MECHANICAL AORTIC VALVE REPLACEMENT: Primary | ICD-10-CM

## 2024-02-06 NOTE — TELEPHONE ENCOUNTER
Patient's INR was 1.8 on 2/6/24. Presented case to Dr Richter. Instructions given for patient to continue current dosage of 6 mg daily and recheck INR on 1/7/24. Patient notified and verbalized understanding. New order placed for INR.     ---- Message from Suzie Sanderson MD sent at 2/2/2024  2:01 PM CST -----  Regarding: Follow up on INR  Dolly Jackson,    Can you please follow up on Ms. العراقي's INR on Monday and to address it with Dr. Oliveira if it is less than 2 or greater than 3?    Thanks!  Suzie

## 2024-02-08 ENCOUNTER — LAB VISIT (OUTPATIENT)
Dept: LAB | Facility: HOSPITAL | Age: 49
End: 2024-02-08
Attending: INTERNAL MEDICINE

## 2024-02-08 DIAGNOSIS — Z95.2 H/O MECHANICAL AORTIC VALVE REPLACEMENT: ICD-10-CM

## 2024-02-08 LAB
INR PPP: 2.1
PROTHROMBIN TIME: 23.9 SECONDS (ref 11.4–14)

## 2024-02-08 PROCEDURE — 85610 PROTHROMBIN TIME: CPT

## 2024-02-08 PROCEDURE — 36415 COLL VENOUS BLD VENIPUNCTURE: CPT

## 2024-02-20 ENCOUNTER — OFFICE VISIT (OUTPATIENT)
Dept: INTERNAL MEDICINE | Facility: CLINIC | Age: 49
End: 2024-02-20

## 2024-02-20 ENCOUNTER — LAB VISIT (OUTPATIENT)
Dept: LAB | Facility: HOSPITAL | Age: 49
End: 2024-02-20
Attending: NURSE PRACTITIONER

## 2024-02-20 ENCOUNTER — CLINICAL SUPPORT (OUTPATIENT)
Dept: INTERNAL MEDICINE | Facility: CLINIC | Age: 49
End: 2024-02-20
Attending: NURSE PRACTITIONER

## 2024-02-20 ENCOUNTER — ANTI-COAG VISIT (OUTPATIENT)
Dept: CARDIOLOGY | Facility: HOSPITAL | Age: 49
End: 2024-02-20

## 2024-02-20 VITALS
HEIGHT: 64 IN | RESPIRATION RATE: 18 BRPM | TEMPERATURE: 98 F | BODY MASS INDEX: 33.57 KG/M2 | SYSTOLIC BLOOD PRESSURE: 121 MMHG | DIASTOLIC BLOOD PRESSURE: 84 MMHG | HEART RATE: 77 BPM | WEIGHT: 196.63 LBS

## 2024-02-20 DIAGNOSIS — E66.09 CLASS 1 OBESITY DUE TO EXCESS CALORIES WITH SERIOUS COMORBIDITY AND BODY MASS INDEX (BMI) OF 33.0 TO 33.9 IN ADULT: Chronic | ICD-10-CM

## 2024-02-20 DIAGNOSIS — Z95.2 H/O MECHANICAL AORTIC VALVE REPLACEMENT: Primary | ICD-10-CM

## 2024-02-20 DIAGNOSIS — Z13.5 DIABETIC RETINOPATHY SCREENING: Primary | ICD-10-CM

## 2024-02-20 DIAGNOSIS — Z00.00 WELLNESS EXAMINATION: ICD-10-CM

## 2024-02-20 DIAGNOSIS — Z59.89 DOES NOT HAVE HEALTH INSURANCE: ICD-10-CM

## 2024-02-20 DIAGNOSIS — E78.2 MIXED HYPERLIPIDEMIA: Chronic | ICD-10-CM

## 2024-02-20 DIAGNOSIS — Z79.01 ANTICOAGULATION MONITORING, INR RANGE 2.5-3.5: ICD-10-CM

## 2024-02-20 DIAGNOSIS — E11.9 TYPE 2 DIABETES MELLITUS WITHOUT COMPLICATION, WITHOUT LONG-TERM CURRENT USE OF INSULIN: ICD-10-CM

## 2024-02-20 DIAGNOSIS — Z95.2 H/O MECHANICAL AORTIC VALVE REPLACEMENT: Chronic | ICD-10-CM

## 2024-02-20 DIAGNOSIS — E03.9 HYPOTHYROIDISM, UNSPECIFIED TYPE: Chronic | ICD-10-CM

## 2024-02-20 DIAGNOSIS — F41.9 ANXIETY: Chronic | ICD-10-CM

## 2024-02-20 DIAGNOSIS — Z13.5 DIABETIC RETINOPATHY SCREENING: ICD-10-CM

## 2024-02-20 DIAGNOSIS — I10 PRIMARY HYPERTENSION: Chronic | ICD-10-CM

## 2024-02-20 DIAGNOSIS — E11.9 TYPE 2 DIABETES MELLITUS WITHOUT COMPLICATION, WITHOUT LONG-TERM CURRENT USE OF INSULIN: Primary | ICD-10-CM

## 2024-02-20 PROBLEM — E66.811 CLASS 1 OBESITY DUE TO EXCESS CALORIES WITH SERIOUS COMORBIDITY AND BODY MASS INDEX (BMI) OF 33.0 TO 33.9 IN ADULT: Chronic | Status: ACTIVE | Noted: 2024-02-02

## 2024-02-20 LAB
ALBUMIN SERPL-MCNC: 4.2 G/DL (ref 3.5–5)
ALBUMIN/GLOB SERPL: 1.4 RATIO (ref 1.1–2)
ALP SERPL-CCNC: 57 UNIT/L (ref 40–150)
ALT SERPL-CCNC: 26 UNIT/L (ref 0–55)
AST SERPL-CCNC: 22 UNIT/L (ref 5–34)
BASOPHILS # BLD AUTO: 0.07 X10(3)/MCL
BASOPHILS NFR BLD AUTO: 0.7 %
BILIRUB SERPL-MCNC: 0.3 MG/DL
BUN SERPL-MCNC: 7.1 MG/DL (ref 7–18.7)
CALCIUM SERPL-MCNC: 9.4 MG/DL (ref 8.4–10.2)
CHLORIDE SERPL-SCNC: 103 MMOL/L (ref 98–107)
CO2 SERPL-SCNC: 28 MMOL/L (ref 22–29)
CREAT SERPL-MCNC: 0.8 MG/DL (ref 0.55–1.02)
CTP QC/QA: YES
DEPRECATED CALCIDIOL+CALCIFEROL SERPL-MC: 29.8 NG/ML (ref 30–80)
EOSINOPHIL # BLD AUTO: 0.11 X10(3)/MCL (ref 0–0.9)
EOSINOPHIL NFR BLD AUTO: 1.1 %
ERYTHROCYTE [DISTWIDTH] IN BLOOD BY AUTOMATED COUNT: 13.8 % (ref 11.5–17)
EST. AVERAGE GLUCOSE BLD GHB EST-MCNC: 142.7 MG/DL
GFR SERPLBLD CREATININE-BSD FMLA CKD-EPI: >60 MLS/MIN/1.73/M2
GLOBULIN SER-MCNC: 3 GM/DL (ref 2.4–3.5)
GLUCOSE SERPL-MCNC: 113 MG/DL (ref 74–100)
HBA1C MFR BLD: 6.6 %
HCT VFR BLD AUTO: 45.9 % (ref 37–47)
HGB BLD-MCNC: 15.1 G/DL (ref 12–16)
IMM GRANULOCYTES # BLD AUTO: 0.04 X10(3)/MCL (ref 0–0.04)
IMM GRANULOCYTES NFR BLD AUTO: 0.4 %
INR PPP: 3.8 (ref 2–3)
LEFT EYE DM RETINOPATHY: NEGATIVE
LYMPHOCYTES # BLD AUTO: 2.19 X10(3)/MCL (ref 0.6–4.6)
LYMPHOCYTES NFR BLD AUTO: 22.1 %
MCH RBC QN AUTO: 27.1 PG (ref 27–31)
MCHC RBC AUTO-ENTMCNC: 32.9 G/DL (ref 33–36)
MCV RBC AUTO: 82.3 FL (ref 80–94)
MONOCYTES # BLD AUTO: 0.59 X10(3)/MCL (ref 0.1–1.3)
MONOCYTES NFR BLD AUTO: 6 %
NEUTROPHILS # BLD AUTO: 6.89 X10(3)/MCL (ref 2.1–9.2)
NEUTROPHILS NFR BLD AUTO: 69.7 %
NRBC BLD AUTO-RTO: 0 %
PLATELET # BLD AUTO: 245 X10(3)/MCL (ref 130–400)
PMV BLD AUTO: 11.4 FL (ref 7.4–10.4)
POTASSIUM SERPL-SCNC: 4 MMOL/L (ref 3.5–5.1)
PROT SERPL-MCNC: 7.2 GM/DL (ref 6.4–8.3)
PROTHROMBIN TIME, POC: 45.2 (ref 2–3)
RBC # BLD AUTO: 5.58 X10(6)/MCL (ref 4.2–5.4)
RIGHT EYE DM RETINOPATHY: NEGATIVE
SODIUM SERPL-SCNC: 140 MMOL/L (ref 136–145)
TSH SERPL-ACNC: 2.53 UIU/ML (ref 0.35–4.94)
WBC # SPEC AUTO: 9.89 X10(3)/MCL (ref 4.5–11.5)

## 2024-02-20 PROCEDURE — 99215 OFFICE O/P EST HI 40 MIN: CPT | Mod: PBBFAC,27 | Performed by: NURSE PRACTITIONER

## 2024-02-20 PROCEDURE — 83036 HEMOGLOBIN GLYCOSYLATED A1C: CPT

## 2024-02-20 PROCEDURE — 85610 PROTHROMBIN TIME: CPT

## 2024-02-20 PROCEDURE — 99214 OFFICE O/P EST MOD 30 MIN: CPT | Mod: 25,S$PBB,, | Performed by: NURSE PRACTITIONER

## 2024-02-20 PROCEDURE — 85025 COMPLETE CBC W/AUTO DIFF WBC: CPT

## 2024-02-20 PROCEDURE — 84443 ASSAY THYROID STIM HORMONE: CPT

## 2024-02-20 PROCEDURE — 82306 VITAMIN D 25 HYDROXY: CPT

## 2024-02-20 PROCEDURE — 36415 COLL VENOUS BLD VENIPUNCTURE: CPT

## 2024-02-20 PROCEDURE — 99211 OFF/OP EST MAY X REQ PHY/QHP: CPT

## 2024-02-20 PROCEDURE — 80053 COMPREHEN METABOLIC PANEL: CPT

## 2024-02-20 RX ORDER — ERGOCALCIFEROL 1.25 MG/1
50000 CAPSULE ORAL
Qty: 12 CAPSULE | Refills: 0 | Status: SHIPPED | OUTPATIENT
Start: 2024-02-20 | End: 2024-05-01 | Stop reason: SDUPTHER

## 2024-02-20 RX ORDER — SEMAGLUTIDE 1.34 MG/ML
0.25 INJECTION, SOLUTION SUBCUTANEOUS
Qty: 2.25 ML | Refills: 0 | Status: SHIPPED | OUTPATIENT
Start: 2024-02-20 | End: 2024-04-05 | Stop reason: SDUPTHER

## 2024-02-20 RX ORDER — FLUOXETINE HYDROCHLORIDE 20 MG/1
20 CAPSULE ORAL DAILY
Qty: 90 CAPSULE | Refills: 0 | Status: SHIPPED | OUTPATIENT
Start: 2024-02-20

## 2024-02-20 RX ORDER — BUSPIRONE HYDROCHLORIDE 15 MG/1
15 TABLET ORAL 2 TIMES DAILY
Qty: 180 TABLET | Refills: 0 | Status: SHIPPED | OUTPATIENT
Start: 2024-02-20

## 2024-02-20 RX ORDER — LEVOTHYROXINE SODIUM 150 UG/1
150 TABLET ORAL DAILY
Qty: 90 TABLET | Refills: 0 | Status: SHIPPED | OUTPATIENT
Start: 2024-02-20

## 2024-02-20 RX ORDER — FAMOTIDINE 40 MG/1
40 TABLET, FILM COATED ORAL 2 TIMES DAILY PRN
Qty: 60 TABLET | Refills: 0 | Status: SHIPPED | OUTPATIENT
Start: 2024-02-20

## 2024-02-20 RX ORDER — METFORMIN HYDROCHLORIDE 1000 MG/1
1000 TABLET ORAL 2 TIMES DAILY
Qty: 180 TABLET | Refills: 0 | Status: SHIPPED | OUTPATIENT
Start: 2024-02-20

## 2024-02-20 SDOH — SOCIAL DETERMINANTS OF HEALTH (SDOH): OTHER PROBLEMS RELATED TO HOUSING AND ECONOMIC CIRCUMSTANCES: Z59.89

## 2024-02-20 NOTE — PROGRESS NOTES
Patient ID: [id number]     Chief Complaint: Annual Exam    HPI:     Zahira العراقي is a 48 y.o. female with diagnosis of HTN, HLD, Paroxysmal A-Fib, Bioprosthetic Aortic Valve Replacement (2012) with Revision Mechanical Aortic Replacement (10/2018), Hx of Endocarditis, DM2, Hypothyroidism, POWER, Obesity, Hx of COVID-19. Patient seen in clinic today for . Patient last seen in clinic on 03/27/2023.   Patient missed multiple appointments.   Patient currently prescribed Levothyroxine 150 mcg daily for hypothyroidism.   Patient currently prescribed Metoprolol Succ 50 mg daily, Triamterene-HCTZ 37.5-25 mg daily for HTN.  Patient currently prescribed metformin 1,000 mg bid and Ozempic 0.25 mg weekly for DM2.   Patient states taking medications as prescribed, tolerating well.     Patient followed by Coumadin Clinic.     Patient followed by Cardiology Clinic. Last appointment on 02/01/2024. Mechanical Aortic Valve Replacement 2/2 failure of bioprosthetic aortic valve; Mechanical aortic valve s/p replacement in Oct 2018; Hx of endocarditis; Postop AFib-no recurrence: I personally reviewed the echo from June 2023 and it revealed preserved EF and normally functioning mechanical aortic valve. INR today 1.1. Patient has run out of her Coumadin for the last 2 days. Will prescribe Coumadin 10 mg to be taken over the next 2 days followed by her usual Coumadin 6 mg dose. Will repeat INR on Monday 02/05/2024. Paperwork to establish care at Coumadin Clinic at [facility] is signed. Continue ASA. DM: Last A1C -6.2 In 3/2023. On ozempic and metformin. Continue management per PCP. HLP: LDL goal <70 given DM,   today 2/1/2024. Pt reports she has not been consistently taking statin and would like to try to take it better first before we increase the dose. Will repeat lipid panel in 3 months. HTN: BP slightly above goal today at 135/85. Pt restarted BP med yesterday. Will continue to monitor, con dyazide 37.5-25mg daily. Obesity: Patient  has lost at least 30 pounds recently and is on ozempic.     Patient followed by Oncology Clinic. Last appointment on 08/15/2022. No personal history of cancer but a family history of cancer. Patient presented via Telemedicine Visit (audio only) today for risk assessment, genetic counseling, and consideration for genetic testing. This patient was evaluated for hereditary risk of cancer and was found to be at an increased risk of having an inherited cancer predisposition gene mutation.  The option of genetic testing was explained in detail, including the possible impact of this information on family members.  Since this patient wishes to proceed with testing an order will be placed online with [facility]. Informed consent will be obtained, lab drawn and sent to [facility].  Results will be expected 4 weeks from this time.  A follow-up appointment will be scheduled for results disclosure. Follow up appointment was scheduled for 10/03/2022, patient canceled, appointment not rescheduled.     Patient followed by GYN clinic. Last appointment on 2022.  here for annual gyn exam. Reports hx of abnormal pap several years ago that required biopsy but no treatment. States last pap was a few years ago with outside provider. Her LMP was . Pt states over the last 6 months she is having a period every 2 weeks with cramping. Denies pain outside of period. Pt was apparently seen in ER for this and has an appt with gyn resident team later this month. She had TL for contraception. Pt has hx of HTN, DM, Afib. Pt also has hx of AVR and is currently on coumadin. Denies any dosage changes in the past year. Fly hx includes maternal and paternal GM with hx of breast cancer. Denies concerns for STIs. Pt is . Patient has follow up appointment on 2023.      Review of patient's allergies indicates:   Allergen Reactions    Lamotrigine Swelling    Ondansetron hcl Swelling    Ondansetron Rash    Ondansetron hcl  (pf) Rash     Breast Cancer Screening: MMG ordered 2023  Cervical Cancer Screening: GYN appt scheduled for 2024  Colorectal Cancer Screening: Cologuard ordered 10/26/2022; patient did not complete  Diabetic Eye Exam: 2024  Diabetic Foot Exam: 2024  Lung Cancer Screening: N/A  Prostate Cancer Screening: N/A  AAA Screening: N/A  Osteoporosis Screening: deferred due to age  Medicare Wellness: N/A  Immunizations:   Immunization History   Administered Date(s) Administered    Tdap 2020     Past Surgical History:   Procedure Laterality Date    AORTIC VALVE REPLACEMENT N/A      SECTION      3     TUBAL LIGATION       family history includes Aneurysm in her mother; Brain cancer in her paternal grandmother; Breast cancer in her paternal grandmother; Breast cancer (age of onset: 50) in her maternal grandmother; Diabetes in her mother; Lymphoma in her paternal grandfather; No Known Problems in her father; Stomach cancer (age of onset: 70) in her maternal grandmother; Stroke in her mother.    Social History     Socioeconomic History    Marital status:    Tobacco Use    Smoking status: Former     Types: Cigarettes    Smokeless tobacco: Never   Substance and Sexual Activity    Alcohol use: Not Currently    Drug use: Not Currently     Types: Marijuana    Sexual activity: Yes     Partners: Male     Birth control/protection: None   Social History Narrative    ** Merged History Encounter **         ** Merged History Encounter **          Social Determinants of Health     Financial Resource Strain: Low Risk  (2024)    Overall Financial Resource Strain (CARDIA)     Difficulty of Paying Living Expenses: Not very hard   Food Insecurity: No Food Insecurity (2024)    Hunger Vital Sign     Worried About Running Out of Food in the Last Year: Never true     Ran Out of Food in the Last Year: Never true   Transportation Needs: Unmet Transportation Needs (2024)    PRAPARE -  Transportation     Lack of Transportation (Medical): Yes     Lack of Transportation (Non-Medical): Yes   Physical Activity: Inactive (2/20/2024)    Exercise Vital Sign     Days of Exercise per Week: 0 days     Minutes of Exercise per Session: 0 min   Stress: Stress Concern Present (2/20/2024)    [employer] - Occupational Stress Questionnaire     Feeling of Stress : To some extent   Social Connections: Moderately Integrated (2/20/2024)    Social Connection and Isolation Panel [NHANES]     Frequency of Communication with Friends and Family: Twice a week     Frequency of Social Gatherings with Friends and Family: Once a week     Attends Judaism Services: More than 4 times per year     Active Member of Clubs or Organizations: No     Attends Club or Organization Meetings: Never     Marital Status:    Housing Stability: Low Risk  (2/20/2024)    Housing Stability Vital Sign     Unable to Pay for Housing in the Last Year: No     Number of Places Lived in the Last Year: 2     Unstable Housing in the Last Year: No     Current Outpatient Medications   Medication Instructions    albuterol (PROVENTIL/VENTOLIN HFA) 90 mcg/actuation inhaler 2 puffs, Inhalation, Every 6 hours PRN, Rescue    albuterol-ipratropium (DUO-NEB) 2.5 mg-0.5 mg/3 mL nebulizer solution Nebulization, Every 6 hours PRN    aspirin (ECOTRIN) 81 mg, Oral, Daily    atorvastatin (LIPITOR) 20 mg, Oral, Daily    busPIRone (BUSPAR) 15 mg, Oral, 2 times daily    ergocalciferol (ERGOCALCIFEROL) 50,000 Units, Oral, Every 7 days    famotidine (PEPCID) 40 mg, Oral, 2 times daily PRN    ferrous sulfate (FEOSOL) 325 mg, Oral, 3 times daily    FLUoxetine 20 mg, Oral, Daily    fluticasone propionate (FLONASE) 50 mcg, Each Nostril, Daily    levothyroxine (SYNTHROID) 150 mcg, Oral, Daily    metFORMIN (GLUCOPHAGE) 1,000 mg, Oral, 2 times daily    metoprolol succinate (TOPROL-XL) 50 mg, Oral, Daily    OZEMPIC 0.25 mg, Subcutaneous, Every 7  "days    triamterene-hydrochlorothiazide 37.5-25 mg (DYAZIDE) 37.5-25 mg per capsule 1 capsule, Oral, Every morning    warfarin (COUMADIN) 6 MG tablet Take 1 tablet oral at bedtime.    warfarin (COUMADIN) 5 mg, Oral, Daily       Subjective:     Review of Systems   Constitutional: Negative.    HENT: Negative.     Eyes: Negative.    Respiratory: Negative.     Cardiovascular: Negative.    Gastrointestinal: Negative.    Endocrine: Negative.    Genitourinary: Negative.    Musculoskeletal: Negative.    Skin: Negative.    Allergic/Immunologic: Negative.    Neurological: Negative.    Hematological: Negative.    Psychiatric/Behavioral: Negative.         Objective:     Visit Vitals  /84 (BP Location: Left arm, Patient Position: Sitting, BP Method: Large (Automatic))   Pulse 77   Temp 98 °F (36.7 °C) (Oral)   Resp 18   Ht 5' 4.02" (1.626 m)   Wt 89.2 kg (196 lb 9.6 oz)   LMP 01/31/2024 (Approximate)   BMI 33.73 kg/m²       Physical Exam  Vitals reviewed.   Constitutional:       Appearance: Normal appearance.   HENT:      Head: Normocephalic and atraumatic.      Mouth/Throat:      Mouth: Mucous membranes are moist.      Pharynx: Oropharynx is clear.   Eyes:      Extraocular Movements: Extraocular movements intact.      Conjunctiva/sclera: Conjunctivae normal.      Pupils: Pupils are equal, round, and reactive to light.   Cardiovascular:      Rate and Rhythm: Normal rate and regular rhythm.      Heart sounds: Normal heart sounds.   Pulmonary:      Effort: Pulmonary effort is normal.      Breath sounds: Normal breath sounds.   Abdominal:      General: Bowel sounds are normal.   Musculoskeletal:         General: Normal range of motion.      Cervical back: Normal range of motion.   Skin:     General: Skin is warm and dry.   Neurological:      Mental Status: She is alert and oriented to person, place, and time.   Psychiatric:         Mood and Affect: Mood normal.         Behavior: Behavior normal.     Protective Sensation (w/ " 10 gram monofilament):  Right: Intact  Left: Intact    Visual Inspection:  Normal -  Bilateral    Pedal Pulses:   Right: Present  Left: Present    Posterior Tibialis Pulses:   Right:Present  Left: Present      Labs Reviewed:     Hematology:  Lab Results   Component Value Date    WBC 8.64 02/01/2024    HGB 14.5 02/01/2024    HCT 44.6 02/01/2024     02/01/2024     Chemistry:  Lab Results   Component Value Date     02/01/2024    K 4.1 02/01/2024    CHLORIDE 104 02/01/2024    BUN 8.5 02/01/2024    CREATININE 0.80 02/01/2024    EGFRNORACEVR >60 02/01/2024    GLUCOSE 152 (H) 02/01/2024    CALCIUM 9.4 02/01/2024    ALKPHOS 48 03/27/2023    LABPROT 7.0 03/27/2023    LABPROT 15.3 01/23/2022    ALBUMIN 4.0 03/27/2023    BILIDIR 0.2 05/05/2022    IBILI 0.40 05/05/2022    AST 35 (H) 03/27/2023    ALT 35 03/27/2023    MG 1.55 (L) 05/19/2021    PHOS 3.6 10/19/2018    ETXRXAOS12SI 26.0 (L) 12/01/2022     Lab Results   Component Value Date    HGBA1C 6.2 03/27/2023     Lipid Panel:  Lab Results   Component Value Date    CHOL 200 02/01/2024    HDL 59 02/01/2024    .00 02/01/2024    TRIG 137 02/01/2024    TOTALCHOLEST 3 02/01/2024     Thyroid:  Lab Results   Component Value Date    TSH 1.3136 06/01/2022    T3FREE 2.45 06/27/2019     Urine:  Lab Results   Component Value Date    COLORUA Light-Yellow 12/01/2022    APPEARANCEUA Clear 12/01/2022    SGUA 1.013 12/01/2022    PHUA 5.5 12/01/2022    PROTEINUA Negative 12/01/2022    GLUCOSEUA Normal 12/01/2022    KETONESUA Negative 12/01/2022    BLOODUA Negative 12/01/2022    NITRITESUA Negative 12/01/2022    LEUKOCYTESUR Negative 12/01/2022    RBCUA 0-5 12/01/2022    WBCUA 0-5 12/01/2022    BACTERIA Trace (A) 12/01/2022    SQEPUA Moderate (A) 12/01/2022    HYALINECASTS None Seen 12/01/2022    CREATRANDUR 81.1 12/01/2022        Assessment:       ICD-10-CM ICD-9-CM   1. Type 2 diabetes mellitus without complication, without long-term current use of insulin  E11.9 250.00   2.  Mixed hyperlipidemia  E78.2 272.2   3. Primary hypertension  I10 401.9   4. H/O mechanical aortic valve replacement  Z95.2 V43.3   5. Hypothyroidism, unspecified type  E03.9 244.9   6. Class 1 obesity due to excess calories with serious comorbidity and body mass index (BMI) of 33.0 to 33.9 in adult  E66.09 278.00    Z68.33 V85.33   7. Wellness examination  Z00.00 V70.0   8. Diabetic retinopathy screening  Z13.5 V80.2   9. Anxiety  F41.9 300.00   10. Does not have health insurance  Z59.89 V60.89        Plan:     1. Type 2 diabetes mellitus without complication, without long-term current use of insulin  Continue Metformin 1,000 mg bid, Ozempic 0.25 mg weekly  Encouraged home CBG monitoring.  Hypoglycemic episodes: denies  Body mass index is 33.73 kg/m².  Hemoglobin A1c   Date Value Ref Range Status   03/27/2023 6.2 <=7.0 % Final     Results for orders placed or performed in visit on 12/01/22   Microalbumin/Creatinine Ratio, Urine   Result Value Ref Range    Urine Microalbumin 48.7 (H) <=30.0 ug/ml    Urine Creatinine 81.1 47.0 - 110.0 mg/dL    Microalbumin Creatinine Ratio 60.0 (H) 0.0 - 30.0 mg/gm Cr   On Atorvastatin  No ACE/ARB  Weight Loss Encouraged  ADA Diet  - Hemoglobin A1C; Future  - Urinalysis; Future  - Microalbumin/Creatinine Ratio, Urine; Future  - semaglutide (OZEMPIC) 0.25 mg or 0.5 mg(2 mg/1.5 mL) pen injector; Inject 0.25 mg into the skin every 7 days.  Dispense: 2.25 mL; Refill: 0    2. Mixed hyperlipidemia  Continue Atorvastatin 20 mg daily  Weight loss encouraged  Low fat/high fiber diet  Increase physical activity  Tobacco cessation encouraged  Cholesterol Total   Date Value Ref Range Status   02/01/2024 200 <=200 mg/dL Final     Comment:     Please add to AM labs     HDL Cholesterol   Date Value Ref Range Status   02/01/2024 59 35 - 60 mg/dL Final     Comment:     Please add to AM labs     Triglyceride   Date Value Ref Range Status   02/01/2024 137 37 - 140 mg/dL Final     Comment:     Please add  to AM labs     LDL Cholesterol   Date Value Ref Range Status   02/01/2024 114.00 50.00 - 140.00 mg/dL Final     3. Primary hypertension  Vitals:    02/20/24 1242   BP: 121/84   Pulse: 77   Resp: 18   Temp: 98 °F (36.7 °C)      Results for orders placed or performed in visit on 12/01/22   Microalbumin/Creatinine Ratio, Urine   Result Value Ref Range    Urine Microalbumin 48.7 (H) <=30.0 ug/ml    Urine Creatinine 81.1 47.0 - 110.0 mg/dL    Microalbumin Creatinine Ratio 60.0 (H) 0.0 - 30.0 mg/gm Cr     Results for orders placed or performed in visit on 05/15/23   IN OFFICE EKG 12-LEAD (to [place])    Collection Time: 05/15/23 10:53 AM    Narrative    Test Reason : I48.0,    Vent. Rate : 088 BPM     Atrial Rate : 088 BPM     P-R Int : 154 ms          QRS Dur : 088 ms      QT Int : 418 ms       P-R-T Axes : 053 084 098 degrees     QTc Int : 505 ms    Normal sinus rhythm  Prolonged QT  Abnormal ECG  No previous ECGs available  Confirmed by Lucas Mendez MD ([id number]) on 5/15/2023 3:04:57 PM    Referred By:             Confirmed By:Lucas Mendez MD   Continue Metoprolol Succ 50 mg daily, Triamterene-HCTZ 37.5-25 mg daily  DASH diet  Encouraged home blood pressure monitoring  - TSH; Future    4. H/O mechanical aortic valve replacement  Followed by Cardiology  Followed by Coumadin Clinic  Continue Coumadin as prescribed    5. Hypothyroidism, unspecified type  TSH level: 1.3  Continue Levothyroxine 150 mcg daily    6. Class 1 obesity due to excess calories with serious comorbidity and body mass index (BMI) of 33.0 to 33.9 in adult  Body mass index is 33.73 kg/m².  TSH   Date Value Ref Range Status   06/01/2022 1.3136 0.3500 - 4.9400 uIU/mL Final     Vit D 25 OH   Date Value Ref Range Status   12/01/2022 26.0 (L) 30.0 - 80.0 ng/mL Final     Hemoglobin A1c   Date Value Ref Range Status   03/27/2023 6.2 <=7.0 % Final   Sleep Study: none noted  Weight Loss Encouraged  Increase Physical Activity  - Vitamin D; Future    7. Wellness examination  -  CBC Auto Differential; Future  - Comprehensive Metabolic Panel; Future  - Hemoglobin A1C; Future  - Urinalysis; Future  - Microalbumin/Creatinine Ratio, Urine; Future  - TSH; Future    8. Diabetic retinopathy screening  - Diabetic Eye Screening Photo    9. Anxiety  - busPIRone (BUSPAR) 15 MG tablet; Take 1 tablet (15 mg total) by mouth 2 (two) times daily.  Dispense: 180 tablet; Refill: 0    10. Does not have health insurance  - Ambulatory referral/consult to Outpatient Case Management      Follow up in about 8 weeks (around 4/16/2024) for Labs, Virtual Visit. In addition to their scheduled follow up, the patient has also been instructed to follow up on as needed basis.     JC LemusP

## 2024-02-20 NOTE — PROGRESS NOTES
Zahira العراقي is a 48 y.o. female here for a diabetic eye screening with non-dilated fundus photos per JIGAR [place].    Patient cooperative?: Yes  Small pupils?: No  Last eye exam: unknown    For exam results, see Encounter Report.

## 2024-02-20 NOTE — PROGRESS NOTES
POC pt/inr performed.  Pt will take warfarin 3 mg today, have a 1/2 cup portion of greens today, then will resume dosing.

## 2024-02-21 ENCOUNTER — PATIENT OUTREACH (OUTPATIENT)
Dept: ADMINISTRATIVE | Facility: OTHER | Age: 49
End: 2024-02-21

## 2024-02-21 NOTE — PROGRESS NOTES
Coumadin Clinic   The patient had INR checked to evaluate for adequate anticoagulation.  Please see labs below:    Lab Results   Component Value Date    INR 3.8 (A) 02/20/2024    INR 2.1 (H) 02/08/2024    INR 1.8 (H) 02/05/2024     Lab Results   Component Value Date    WBC 9.89 02/20/2024    HGB 15.1 02/20/2024    HCT 45.9 02/20/2024    MCV 82.3 02/20/2024     02/20/2024         ASSESS:  The patient is above the recommended INR range of 2.5 to 3.5  The H/H is in the normal range.     REC:  Adjust coumadin dose and periodically recheck INR

## 2024-02-22 NOTE — PROGRESS NOTES
LPN spoke to patient/caregiver as per OPCM referral for: No insurance coverage    Does the patient consent to completing the assessment/enrollment: Yes  Does the patient consent for LPN to speak to a caregiver? No    Health Insurance Coverage:     Does the patient have adequate health insurance coverage? No  Education provided: Yes    PCP Follow-Up Appointments:    Does the patient have a primary care provider? yes - Keren Owens NP  Date of last PCP appointment? 2/20/24  Next PCP appointment:  4/19/24  Was patient provided with education surrounding PCP services/creating a medical home? yes -       Specialist Appointments:     Does the patient have a pending specialist referral? no  Does the patient have an upcoming specialist appointment? yes - gyn, cardio  Is the patient pregnant? No  Does the patient have a mental health provider? no       Home Medications:     Reviewed medication list with patient? No  Is the patient able to afford their medications? Yes        Recent lab results:  Blood Sugar:    Provided education: No  Blood Pressure:   Provided education: No        Social Determinants of Health (SDOH)    Patient's identified areas of need:    insurance  Education/Resources provided:          Home Health/DME:    Current Home Health: No  Patient has all healthcare equipment/supplies indicated: yes  Current DME: none    Additional Documentation:   Spoke to patient based on OPCM referral for insurance. She states she lost her medicaid earlier this year when it was time to renew, she did not. I gave her the number to medicaid as well as bldg 7 at [facility] and explained they could also help apply for free care. Discussed importance. Pt has a f/u next month in coumadin clinic and will f/u with pcp in April 2024. Gets rides from family/friends. Discussed medicaid has transp if she is re-approved. Denies needs with food/utility/housing. Will f/u in 2 weeks.       Follow up:   Patient agrees to scheduled follow up call.

## 2024-03-06 ENCOUNTER — PATIENT OUTREACH (OUTPATIENT)
Dept: ADMINISTRATIVE | Facility: OTHER | Age: 49
End: 2024-03-06

## 2024-03-06 NOTE — PROGRESS NOTES
CHW - Case Closure    This LPN spoke to patient via telephone today.   Pt/Caregiver reported: States she has not had time to go to [facility]. 7 or call Medicaid. Stressed importance. She has the numbers and is thankful for call. She states she will do so soon. Denies any other needs at this time.  Pt/Caregiver denied any additional needs at this time and agrees with episode closure at this time.  Provided patient with Community Health Worker's contact information and encouraged him/her to contact this Community Health Worker if additional needs arise.

## 2024-04-05 DIAGNOSIS — E11.9 TYPE 2 DIABETES MELLITUS WITHOUT COMPLICATION, WITHOUT LONG-TERM CURRENT USE OF INSULIN: ICD-10-CM

## 2024-04-09 RX ORDER — SEMAGLUTIDE 1.34 MG/ML
0.25 INJECTION, SOLUTION SUBCUTANEOUS
Qty: 2.25 ML | Refills: 1 | Status: SHIPPED | OUTPATIENT
Start: 2024-04-09 | End: 2024-04-19 | Stop reason: SDUPTHER

## 2024-04-19 ENCOUNTER — OFFICE VISIT (OUTPATIENT)
Dept: INTERNAL MEDICINE | Facility: CLINIC | Age: 49
End: 2024-04-19

## 2024-04-19 DIAGNOSIS — E11.9 TYPE 2 DIABETES MELLITUS WITHOUT COMPLICATION, WITHOUT LONG-TERM CURRENT USE OF INSULIN: Primary | ICD-10-CM

## 2024-04-19 DIAGNOSIS — Z59.89 DOES NOT HAVE HEALTH INSURANCE: ICD-10-CM

## 2024-04-19 DIAGNOSIS — J06.9 UPPER RESPIRATORY TRACT INFECTION, UNSPECIFIED TYPE: ICD-10-CM

## 2024-04-19 PROCEDURE — 99213 OFFICE O/P EST LOW 20 MIN: CPT | Mod: 95,,, | Performed by: NURSE PRACTITIONER

## 2024-04-19 RX ORDER — SEMAGLUTIDE 1.34 MG/ML
0.5 INJECTION, SOLUTION SUBCUTANEOUS
Qty: 4.5 ML | Refills: 1 | Status: SHIPPED | OUTPATIENT
Start: 2024-04-19

## 2024-04-19 RX ORDER — METHYLPREDNISOLONE 4 MG/1
TABLET ORAL
Qty: 21 EACH | Refills: 0 | Status: SHIPPED | OUTPATIENT
Start: 2024-04-19 | End: 2024-05-10

## 2024-04-19 SDOH — SOCIAL DETERMINANTS OF HEALTH (SDOH): OTHER PROBLEMS RELATED TO HOUSING AND ECONOMIC CIRCUMSTANCES: Z59.89

## 2024-04-19 NOTE — PROGRESS NOTES
Patient ID: [id number]     Chief Complaint: Follow-up and Results (States been sick for the past month.)    HPI:     The patient location is: work  The chief complaint leading to consultation is: follow up    Visit type: audiovisual    Face to Face time with patient: 10 minutes  15 minutes of total time spent on the encounter, which includes face to face time and non-face to face time preparing to see the patient (eg, review of tests), Obtaining and/or reviewing separately obtained history, Documenting clinical information in the electronic or other health record, Independently interpreting results (not separately reported) and communicating results to the patient/family/caregiver, or Care coordination (not separately reported).     Each patient to whom he or she provides medical services by telemedicine is:  (1) informed of the relationship between the physician and patient and the respective role of any other health care provider with respect to management of the patient; and (2) notified that he or she may decline to receive medical services by telemedicine and may withdraw from such care at any time.      Zahira العراقي is a 48 y.o. female with diagnosis of HTN, HLD, Paroxysmal A-Fib, Bioprosthetic Aortic Valve Replacement (2012) with Revision Mechanical Aortic Replacement (10/2018), Hx of Endocarditis, DM2, Hypothyroidism, POWER, Obesity, Hx of COVID-19. Patient seen today for follow up. Patient last seen in clinic on 002/20/2024.    Patient currently prescribed metformin 1,000 mg bid and Ozempic 0.25 mg weekly for DM2. Patient denies hypoglycemia symptoms. Previous A1c: 6.2. Current A1c is 6.6.  Patient currently prescribed Levothyroxine 150 mcg daily for hypothyroidism.   Patient currently prescribed Metoprolol Succ 50 mg daily, Triamterene-HCTZ 37.5-25 mg daily for HTN. Patient denies chest pain, SOB.   Patient states taking medications as prescribed, tolerating well.   Today, patient states congestion,  cough x1 month. Patient states she was diagnosed with COVID x1 month ago. States she felt better but then her  was sick a couple weeks ago and then got sick again. Patient states taking OTC medication, starting to feel better. Patient denies fever, chills, chest pain, SOB. Patient denies any other acute complaints.     Patient followed by Coumadin Clinic.     Patient followed by Cardiology Clinic. Last appointment on 02/01/2024. Mechanical Aortic Valve Replacement 2/2 failure of bioprosthetic aortic valve; Mechanical aortic valve s/p replacement in Oct 2018; Hx of endocarditis; Postop AFib-no recurrence: I personally reviewed the echo from June 2023 and it revealed preserved EF and normally functioning mechanical aortic valve. INR today 1.1. Patient has run out of her Coumadin for the last 2 days. Will prescribe Coumadin 10 mg to be taken over the next 2 days followed by her usual Coumadin 6 mg dose. Will repeat INR on Monday 02/05/2024. Paperwork to establish care at [facility] Clinic at [facility] is signed. Continue ASA. DM: Last A1C -6.2 In 3/2023. On ozempic and metformin. Continue management per PCP. HLP: LDL goal <70 given DM,   today 2/1/2024. Pt reports she has not been consistently taking statin and would like to try to take it better first before we increase the dose. Will repeat lipid panel in 3 months. HTN: BP slightly above goal today at 135/85. Pt restarted BP med yesterday. Will continue to monitor, con dyazide 37.5-25mg daily. Obesity: Patient has lost at least 30 pounds recently and is on ozempic.     Patient followed by Oncology Clinic. Last appointment on 08/15/2022. No personal history of cancer but a family history of cancer. Patient presented via Telemedicine Visit (audio only) today for risk assessment, genetic counseling, and consideration for genetic testing. This patient was evaluated for hereditary risk of cancer and was found to be at an increased risk of having an inherited cancer  predisposition gene mutation.  The option of genetic testing was explained in detail, including the possible impact of this information on family members.  Since this patient wishes to proceed with testing an order will be placed online with [facility]. Informed consent will be obtained, lab drawn and sent to [facility].  Results will be expected 4 weeks from this time.  A follow-up appointment will be scheduled for results disclosure. Follow up appointment was scheduled for 10/03/2022, patient canceled, appointment not rescheduled.     Patient followed by GYN clinic. Last appointment on 2022.  here for annual gyn exam. Reports hx of abnormal pap several years ago that required biopsy but no treatment. States last pap was a few years ago with outside provider. Her LMP was . Pt states over the last 6 months she is having a period every 2 weeks with cramping. Denies pain outside of period. Pt was apparently seen in ER for this and has an appt with gyn resident team later this month. She had TL for contraception. Pt has hx of HTN, DM, Afib. Pt also has hx of AVR and is currently on coumadin. Denies any dosage changes in the past year. Fly hx includes maternal and paternal GM with hx of breast cancer. Denies concerns for STIs. Pt is . Patient has follow up appointment on 2023.      Review of patient's allergies indicates:   Allergen Reactions    Lamotrigine Swelling    Ondansetron hcl Swelling    Ondansetron Rash    Ondansetron hcl (pf) Rash     Breast Cancer Screening: MMG ordered 2023  Cervical Cancer Screening: GYN appt scheduled for 2024  Colorectal Cancer Screening: Cologuard ordered 10/26/2022; patient did not complete  Diabetic Eye Exam: 2024  Diabetic Foot Exam: 2024  Lung Cancer Screening: N/A  Prostate Cancer Screening: N/A  AAA Screening: N/A  Osteoporosis Screening: deferred due to age  Medicare Wellness: N/A  Immunizations:   Immunization  History   Administered Date(s) Administered    Tdap 2020     Past Surgical History:   Procedure Laterality Date    AORTIC VALVE REPLACEMENT N/A      SECTION      3     TUBAL LIGATION       family history includes Aneurysm in her mother; Brain cancer in her paternal grandmother; Breast cancer in her paternal grandmother; Breast cancer (age of onset: 50) in her maternal grandmother; Diabetes in her mother; Lymphoma in her paternal grandfather; No Known Problems in her father; Stomach cancer (age of onset: 70) in her maternal grandmother; Stroke in her mother.    Social History     Socioeconomic History    Marital status:    Tobacco Use    Smoking status: Former     Types: Cigarettes    Smokeless tobacco: Never   Substance and Sexual Activity    Alcohol use: Not Currently    Drug use: Not Currently     Types: Marijuana    Sexual activity: Yes     Partners: Male     Birth control/protection: None   Social History Narrative    ** Merged History Encounter **         ** Merged History Encounter **          Social Determinants of Health     Financial Resource Strain: Low Risk  (2024)    Overall Financial Resource Strain (CARDIA)     Difficulty of Paying Living Expenses: Not very hard   Food Insecurity: Food Insecurity Present (2024)    Hunger Vital Sign     Worried About Running Out of Food in the Last Year: Sometimes true     Ran Out of Food in the Last Year: Never true   Transportation Needs: Unmet Transportation Needs (2024)    PRAPARE - Transportation     Lack of Transportation (Medical): Yes     Lack of Transportation (Non-Medical): No   Physical Activity: Sufficiently Active (2024)    Exercise Vital Sign     Days of Exercise per Week: 5 days     Minutes of Exercise per Session: 50 min   Recent Concern: Physical Activity - Inactive (2024)    Exercise Vital Sign     Days of Exercise per Week: 0 days     Minutes of Exercise per Session: 0 min   Stress: No Stress Concern  Present (4/19/2024)    [employer] [place] [employer] - Occupational Stress Questionnaire     Feeling of Stress : Only a little   Recent Concern: Stress - Stress Concern Present (2/22/2024)    [employer] [place] [employer] - Occupational Stress Questionnaire     Feeling of Stress : To some extent   Social Connections: Moderately Integrated (4/19/2024)    Social Connection and Isolation Panel [NHANES]     Frequency of Communication with Friends and Family: More than three times a week     Frequency of Social Gatherings with Friends and Family: Twice a week     Attends Oriental orthodox Services: More than 4 times per year     Active Member of Clubs or Organizations: No     Attends Club or Organization Meetings: Never     Marital Status:    Housing Stability: Low Risk  (2/22/2024)    Housing Stability Vital Sign     Unable to Pay for Housing in the Last Year: No     Number of Places Lived in the Last Year: 2     Unstable Housing in the Last Year: No     Current Outpatient Medications   Medication Instructions    albuterol (PROVENTIL/VENTOLIN HFA) 90 mcg/actuation inhaler 2 puffs, Inhalation, Every 6 hours PRN, Rescue    albuterol-ipratropium (DUO-NEB) 2.5 mg-0.5 mg/3 mL nebulizer solution Nebulization, Every 6 hours PRN    aspirin (ECOTRIN) 81 mg, Oral, Daily    atorvastatin (LIPITOR) 20 mg, Oral, Daily    busPIRone (BUSPAR) 15 mg, Oral, 2 times daily    ergocalciferol (ERGOCALCIFEROL) 50,000 Units, Oral, Every 7 days    famotidine (PEPCID) 40 mg, Oral, 2 times daily PRN    ferrous sulfate (FEOSOL) 325 mg, Oral, 3 times daily    FLUoxetine 20 mg, Oral, Daily    fluticasone propionate (FLONASE) 50 mcg, Each Nostril, Daily    levothyroxine (SYNTHROID) 150 mcg, Oral, Daily    metFORMIN (GLUCOPHAGE) 1,000 mg, Oral, 2 times daily    methylPREDNISolone (MEDROL DOSEPACK) 4 mg tablet use as directed    metoprolol succinate (TOPROL-XL) 50 mg, Oral, Daily    OZEMPIC 0.5 mg, Subcutaneous, Every 7 days     triamterene-hydrochlorothiazide 37.5-25 mg (DYAZIDE) 37.5-25 mg per capsule 1 capsule, Oral, Every morning    warfarin (COUMADIN) 6 MG tablet Take 1 tablet oral at bedtime.    warfarin (COUMADIN) 5 mg, Oral, Daily       Subjective:     Review of Systems   Constitutional: Negative.  Negative for activity change and unexpected weight change.   HENT:  Positive for congestion and rhinorrhea. Negative for hearing loss and trouble swallowing.    Eyes: Negative.  Negative for discharge and visual disturbance.   Respiratory:  Positive for cough. Negative for chest tightness and wheezing.    Cardiovascular: Negative.  Negative for chest pain and palpitations.   Gastrointestinal: Negative.  Negative for blood in stool, constipation, diarrhea and vomiting.   Endocrine: Negative.  Negative for polydipsia and polyuria.   Genitourinary: Negative.  Negative for difficulty urinating, dysuria, hematuria and menstrual problem.   Musculoskeletal:  Positive for arthralgias. Negative for joint swelling and neck pain.   Skin: Negative.    Allergic/Immunologic: Negative.    Neurological: Negative.  Negative for weakness and headaches.   Hematological: Negative.    Psychiatric/Behavioral: Negative.  Negative for confusion and dysphoric mood.        Objective:     There were no vitals taken for this visit.      Physical Exam  Neurological:      Mental Status: She is alert and oriented to person, place, and time.   Psychiatric:         Mood and Affect: Mood normal.       Labs Reviewed:     Hematology:  Lab Results   Component Value Date    WBC 9.89 02/20/2024    HGB 15.1 02/20/2024    HCT 45.9 02/20/2024     02/20/2024     Chemistry:  Lab Results   Component Value Date     02/20/2024    K 4.0 02/20/2024    CHLORIDE 103 02/20/2024    BUN 7.1 02/20/2024    CREATININE 0.80 02/20/2024    EGFRNORACEVR >60 02/20/2024    GLUCOSE 113 (H) 02/20/2024    CALCIUM 9.4 02/20/2024    ALKPHOS 57 02/20/2024    LABPROT 7.2 02/20/2024    LABPROT  15.3 01/23/2022    ALBUMIN 4.2 02/20/2024    BILIDIR 0.2 05/05/2022    IBILI 0.40 05/05/2022    AST 22 02/20/2024    ALT 26 02/20/2024    MG 1.55 (L) 05/19/2021    PHOS 3.6 10/19/2018    [id number] 29.8 (L) 02/20/2024     Lab Results   Component Value Date    HGBA1C 6.6 02/20/2024     Lipid Panel:  Lab Results   Component Value Date    CHOL 200 02/01/2024    HDL 59 02/01/2024    .00 02/01/2024    TRIG 137 02/01/2024    TOTALCHOLEST 3 02/01/2024     Thyroid:  Lab Results   Component Value Date    TSH 2.531 02/20/2024    T3FREE 2.45 06/27/2019     Urine:  Lab Results   Component Value Date    COLORUA Light-Yellow 12/01/2022    APPEARANCEUA Clear 12/01/2022    SGUA 1.013 12/01/2022    PHUA 5.5 12/01/2022    PROTEINUA Negative 12/01/2022    GLUCOSEUA Normal 12/01/2022    KETONESUA Negative 12/01/2022    BLOODUA Negative 12/01/2022    NITRITESUA Negative 12/01/2022    LEUKOCYTESUR Negative 12/01/2022    RBCUA 0-5 12/01/2022    WBCUA 0-5 12/01/2022    BACTERIA Trace (A) 12/01/2022    SQEPUA Moderate (A) 12/01/2022    HYALINECASTS None Seen 12/01/2022    CREATRANDUR 81.1 12/01/2022        Assessment:       ICD-10-CM ICD-9-CM   1. Type 2 diabetes mellitus without complication, without long-term current use of insulin  E11.9 250.00   2. Upper respiratory tract infection, unspecified type  J06.9 465.9   3. Does not have health insurance  Z59.89 V60.89       Plan:     1. Type 2 diabetes mellitus without complication, without long-term current use of insulin  Continue Metformin 1,000 mg bid  Increase Ozempic to 0.5 mg weekly  Encouraged home CBG monitoring.  Hypoglycemic episodes: denies  There is no height or weight on file to calculate BMI.  Hemoglobin A1c   Date Value Ref Range Status   02/20/2024 6.6 <=7.0 % Final     Results for orders placed or performed in visit on 12/01/22   Microalbumin/Creatinine Ratio, Urine   Result Value Ref Range    Urine Microalbumin 48.7 (H) <=30.0 ug/ml    Urine Creatinine 81.1 47.0 -  110.0 mg/dL    Microalbumin Creatinine Ratio 60.0 (H) 0.0 - 30.0 mg/gm Cr   On Atorvastatin  No ACE/ARB  Weight Loss Encouraged  ADA Diet    2. Upper respiratory tract infection, unspecified type  Medrol Dose Pack    3. Does not have health insurance  Referral sent to case management at previous appt      Follow up in about 4 months (around 8/19/2024) for Labs. In addition to their scheduled follow up, the patient has also been instructed to follow up on as needed basis.     Keren Owens, JCP

## 2024-05-06 RX ORDER — ERGOCALCIFEROL 1.25 MG/1
50000 CAPSULE ORAL
Qty: 12 CAPSULE | Refills: 1 | Status: SHIPPED | OUTPATIENT
Start: 2024-05-06

## 2024-07-09 ENCOUNTER — TELEPHONE (OUTPATIENT)
Dept: CARDIOLOGY | Facility: CLINIC | Age: 49
End: 2024-07-09

## 2024-07-09 NOTE — TELEPHONE ENCOUNTER
Attempted to contact patient to reschedule 08/05/24 apt, but no answer. Apt has been rescheduled with PA for 08/13/2024 and rescheduled letter place in basket to be mailed out.

## 2024-08-02 RX ORDER — LEVOTHYROXINE SODIUM 150 UG/1
150 TABLET ORAL DAILY
Qty: 90 TABLET | Refills: 0 | Status: SHIPPED | OUTPATIENT
Start: 2024-08-02

## 2024-08-02 RX ORDER — FLUOXETINE HYDROCHLORIDE 20 MG/1
20 CAPSULE ORAL DAILY
Qty: 90 CAPSULE | Refills: 0 | Status: SHIPPED | OUTPATIENT
Start: 2024-08-02

## 2024-08-06 NOTE — PROGRESS NOTES
CHIEF COMPLAINT: No chief complaint on file.                                                 HPI:  Zahira العراقي 48 y.o. female with history of bioprosthetic aortic valve replacement in 2012 2/2 endocarditis with revisional mechanical aortic valve replacement in October 2018 presents for follow up.  At patient's last office visit she was here to reestablish care after being lost to follow up.  At that time she had not been following with Coumadin clinic and had actually run out of given at that time.  Since then she has reestablish care with Coumadin clinic, however she states it has been several months since she saw them for INR check.    Today the patient states that she feels well from a cardiac standpoint.  She denies any cardiac complaints such as chest pain, palpitations, PND, orthopnea, SOB, LOPEZ, lightheadedness, dizziness, syncope, or claudication symptoms.  She endorses occasional peripheral edema that occurs in bilateral lower extremities at in the day.  She works as a  and states that she is running around the restaurant all day so by the time she gets home she does have some swelling in her lower extremities.  She was active in her daily life but denies much formal exercise.  She reports compliance with her current medications, though her Coumadin compliance is questionable.  Strongly encouraged all medication compliance.  She does not follow a specific diet at this time.  She denies any tobacco or other illicit drug use.                                                                                                                                                                                                                                                                                                                                                                                                                                                                                       CARDIAC TESTING:  Echo 6.12.23  · Normal right ventricular size with normal right ventricular systolic function.  · There is a bioprosthetic aortic valve present.  · The aortic valve mean gradient is 13 mmHg with a dimensionless index of 0.52.  · Normal central venous pressure (3 mmHg).  · The estimated PA systolic pressure is 37 mmHg.  · The left ventricle is normal in size with normal systolic function.  · The estimated ejection fraction is 60%.  · Normal left ventricular diastolic function.  · There is mild pulmonary hypertension.    Echocardiogram June 4, 2021:  Left ventricular ejection fraction is measured at approximately 60%.  Structurally normal mitral valve.  Trace mitral regurgitation.  Normally functioning mechanical prosthetic valve in aortic position.  Mild aortic regurgitation present.  Peak velocity across the aortic valve is 2.6m/sec. Peak gradient is 27.27 mmHg. Mean gradient is 13.54mmHg.  Tricuspid valve is structurally normal.  There is mild tricuspid regurgitation with RVSP estimated 49 mmHg.  Mitral valve is structurally normal.  Moderately dilated left atrium.  Mildly dilated right atrium.  Right ventricle global systolic function is mildly reduced.  IVC normal.    Echocardiogram March 2020:  EF 55 to 65%. Normal left ventricle diastolic function.  Mild tricuspid regurgitation  Mild pulmonary hypertension present  Aortic valve: There is a mechanical valve present  Prosthetic aortic valve function is normal.    TTE (Nov 2018):  Left ventricular ejection fraction is measured at approximately 65%.  Right ventricle global systolic function is mildly reduced.  Normal size right ventricle cavity.  Mechanical prosthetic valve present in the aortic position.  Aortic valve mean gradient of 12.85 mm Hg.        Patient Active Problem List   Diagnosis    H/O mechanical aortic valve replacement    History of endocarditis    Postoperative atrial fibrillation    Type 2 diabetes mellitus without  complication, without long-term current use of insulin    Hyperlipidemia    Mitral valve prolapse    Primary hypertension    Hypothyroid    Anxiety    Bilateral hand pain    Acute pain of right knee    Vitamin D deficiency    Joint pain    Cervical cancer screening    Class 1 obesity due to excess calories with serious comorbidity and body mass index (BMI) of 33.0 to 33.9 in adult     Past Surgical History:   Procedure Laterality Date    AORTIC VALVE REPLACEMENT N/A      SECTION      3     TUBAL LIGATION       Social History     Socioeconomic History    Marital status:    Tobacco Use    Smoking status: Former     Types: Cigarettes    Smokeless tobacco: Never   Substance and Sexual Activity    Alcohol use: Not Currently    Drug use: Not Currently     Types: Marijuana    Sexual activity: Yes     Partners: Male     Birth control/protection: None   Social History Narrative    ** Merged History Encounter **         ** Merged History Encounter **          Social Determinants of Health     Financial Resource Strain: Low Risk  (2024)    Overall Financial Resource Strain (CARDIA)     Difficulty of Paying Living Expenses: Not very hard   Food Insecurity: Food Insecurity Present (2024)    Hunger Vital Sign     Worried About Running Out of Food in the Last Year: Sometimes true     Ran Out of Food in the Last Year: Never true   Transportation Needs: Unmet Transportation Needs (2024)    PRAPARE - Transportation     Lack of Transportation (Medical): Yes     Lack of Transportation (Non-Medical): No   Physical Activity: Sufficiently Active (2024)    Exercise Vital Sign     Days of Exercise per Week: 5 days     Minutes of Exercise per Session: 50 min   Recent Concern: Physical Activity - Inactive (2024)    Exercise Vital Sign     Days of Exercise per Week: 0 days     Minutes of Exercise per Session: 0 min   Stress: No Stress Concern Present (2024)    [employer] - Occupational Stress Questionnaire     Feeling of Stress : Only a little   Recent Concern: Stress - Stress Concern Present (2/22/2024)    [employer] - Occupational Stress Questionnaire     Feeling of Stress : To some extent   Housing Stability: Low Risk  (2/22/2024)    Housing Stability Vital Sign     Unable to Pay for Housing in the Last Year: No     Number of Places Lived in the Last Year: 2     Unstable Housing in the Last Year: No        Family History   Problem Relation Name Age of Onset    Diabetes Mother      Aneurysm Mother      Stroke Mother      No Known Problems Father      Breast cancer Maternal Grandmother  50        diagnosed twice: 50y,60y    Stomach cancer Maternal Grandmother  70    Breast cancer Paternal Grandmother      Brain cancer Paternal Grandmother      Lymphoma Paternal Grandfather       Review of patient's allergies indicates:   Allergen Reactions    Lamotrigine Swelling    Ondansetron hcl Swelling    Ondansetron Rash    Ondansetron hcl (pf) Rash         ROS:  Review of Systems   Constitutional: Negative.  Negative for malaise/fatigue.   HENT: Negative.     Eyes: Negative.    Respiratory: Negative.  Negative for shortness of breath.    Cardiovascular:  Positive for leg swelling.   Gastrointestinal: Negative.    Genitourinary: Negative.    Musculoskeletal: Negative.    Skin: Negative.    Neurological: Negative.    Endo/Heme/Allergies: Negative.    Psychiatric/Behavioral: Negative.                                                                                                                                                                                  Negative except as stated in the history of present illness. See HPI for details.    PHYSICAL EXAM:  There were no vitals taken for this visit.    Physical Exam  HENT:      Head: Normocephalic.      Nose: Nose normal.      Mouth/Throat:      Mouth: Mucous membranes are moist.   Eyes:      Extraocular  Movements: Extraocular movements intact.   Neck:      Vascular: No carotid bruit.   Cardiovascular:      Rate and Rhythm: Normal rate and regular rhythm.      Pulses: Normal pulses.      Heart sounds: No murmur heard.  Pulmonary:      Effort: Pulmonary effort is normal.   Abdominal:      General: There is no distension.   Musculoskeletal:         General: Normal range of motion.      Cervical back: Normal range of motion.      Right lower leg: Edema (Mild) present.      Left lower leg: Edema (Mild) present.   Skin:     General: Skin is warm.   Neurological:      General: No focal deficit present.      Mental Status: She is alert and oriented to person, place, and time.   Psychiatric:         Mood and Affect: Mood normal.         Current Outpatient Medications   Medication Instructions    albuterol (PROVENTIL/VENTOLIN HFA) 90 mcg/actuation inhaler 2 puffs, Inhalation, Every 6 hours PRN, Rescue    albuterol-ipratropium (DUO-NEB) 2.5 mg-0.5 mg/3 mL nebulizer solution Nebulization, Every 6 hours PRN    aspirin (ECOTRIN) 81 mg, Oral, Daily    atorvastatin (LIPITOR) 20 mg, Oral, Daily    busPIRone (BUSPAR) 15 mg, Oral, 2 times daily    ergocalciferol (ERGOCALCIFEROL) 50,000 Units, Oral, Every 7 days    famotidine (PEPCID) 40 mg, Oral, 2 times daily PRN    ferrous sulfate (FEOSOL) 325 mg, Oral, 3 times daily    FLUoxetine 20 mg, Oral, Daily    fluticasone propionate (FLONASE) 50 mcg, Each Nostril, Daily    levothyroxine (SYNTHROID) 150 mcg, Oral, Daily    metFORMIN (GLUCOPHAGE) 1,000 mg, Oral, 2 times daily    metoprolol succinate (TOPROL-XL) 50 mg, Oral, Daily    OZEMPIC 0.5 mg, Subcutaneous, Every 7 days    triamterene-hydrochlorothiazide 37.5-25 mg (DYAZIDE) 37.5-25 mg per capsule 1 capsule, Oral, Every morning    warfarin (COUMADIN) 6 MG tablet Take 1 tablet oral at bedtime.    warfarin (COUMADIN) 5 mg, Oral, Daily        All medications, laboratory studies, cardiac diagnostic imaging reviewed.     Lab Results  "  Component Value Date    .00 02/01/2024    LDL 59.00 12/01/2022    TRIG 137 02/01/2024    TRIG 98 12/01/2022    CREATININE 0.80 02/20/2024    MG 1.55 (L) 05/19/2021    K 4.0 02/20/2024        ASSESSMENT/PLAN:  Mechanical Aortic Valve Replacement 2/2 failure of bioprosthetic aortic valve  Mechanical aortic valve s/p replacement in Oct 2018  Hx of endocarditis  Postop AFib-no recurrence  Asymptomatic  Echo from June 2023 revealed preserved EF and normally functioning mechanical aortic valve.   No updated INR on file  Will order updated INR  Patient has not been following with Coumadin clinic at [facility].  She states that she fell off for few months because of depression  Strongly encouraged patient to call and reschedule appointment   She states that she has been taking her Coumadin as it was previously prescribed and she follows the recommended diet"  Continue ASA  Regular rate and rhythm on auscultation today   Strict ED precautions given     DM  Last A1C -6.6 2.20.24  On ozempic and metformin   Continue management per PCP      HLP  LDL goal <70 given DM    today 2/1/2024  Pt reports she has not been consistently taking statin and would like to try to take it better first before we increase the dose- can defer to PCP since she will be seeing them first   FLP/CMP ordered at last office visit, but patient did not complete   Reminded patient to complete lab work today   Counseled on importance of following a low-cholesterol, low-fat diet and exercise as tolerated     HTN  BP slightly above goal today at 135/85  Will continue to monitor, with Dyazide 37.5-25mg daily    Obesity  Patient continuing to lose weight with diet, exercise, and Ozempic      Repeat INR  Schedule appointment to meet with Claudia at [facility]   Follow up in cardiology clinic in 4 months or sooner if needed   Follow up with PCP as directed  Please notify clinic if any new concerns or any change in symptoms   Strict ED precautions " given

## 2024-08-13 ENCOUNTER — OFFICE VISIT (OUTPATIENT)
Dept: CARDIOLOGY | Facility: CLINIC | Age: 49
End: 2024-08-13

## 2024-08-13 VITALS
RESPIRATION RATE: 18 BRPM | OXYGEN SATURATION: 96 % | DIASTOLIC BLOOD PRESSURE: 85 MMHG | BODY MASS INDEX: 32.41 KG/M2 | SYSTOLIC BLOOD PRESSURE: 135 MMHG | WEIGHT: 189.81 LBS | HEIGHT: 64 IN | HEART RATE: 77 BPM | TEMPERATURE: 99 F

## 2024-08-13 DIAGNOSIS — I97.89 POSTOPERATIVE ATRIAL FIBRILLATION: ICD-10-CM

## 2024-08-13 DIAGNOSIS — I34.1 MITRAL VALVE PROLAPSE: ICD-10-CM

## 2024-08-13 DIAGNOSIS — E78.2 MIXED HYPERLIPIDEMIA: Chronic | ICD-10-CM

## 2024-08-13 DIAGNOSIS — Z95.2 H/O MECHANICAL AORTIC VALVE REPLACEMENT: Primary | Chronic | ICD-10-CM

## 2024-08-13 DIAGNOSIS — Z91.199 NON COMPLIANCE WITH MEDICAL TREATMENT: ICD-10-CM

## 2024-08-13 DIAGNOSIS — I10 PRIMARY HYPERTENSION: Chronic | ICD-10-CM

## 2024-08-13 DIAGNOSIS — Z86.79 HISTORY OF ENDOCARDITIS: ICD-10-CM

## 2024-08-13 DIAGNOSIS — I48.91 POSTOPERATIVE ATRIAL FIBRILLATION: ICD-10-CM

## 2024-08-13 PROCEDURE — 99214 OFFICE O/P EST MOD 30 MIN: CPT | Mod: S$PBB,,,

## 2024-08-13 PROCEDURE — 99215 OFFICE O/P EST HI 40 MIN: CPT | Mod: PBBFAC

## 2024-08-13 NOTE — PATIENT INSTRUCTIONS
Repeat INR  Schedule appointment to meet with Claudia at [facility]   Follow up in cardiology clinic in 4 months or sooner if needed   Follow up with PCP as directed  Please notify clinic if any new concerns or any change in symptoms   Strict ED precautions given

## 2024-09-04 ENCOUNTER — TELEPHONE (OUTPATIENT)
Dept: INTERNAL MEDICINE | Facility: CLINIC | Age: 49
End: 2024-09-04

## 2024-09-04 DIAGNOSIS — E11.9 TYPE 2 DIABETES MELLITUS WITHOUT COMPLICATION, WITHOUT LONG-TERM CURRENT USE OF INSULIN: ICD-10-CM

## 2024-09-04 RX ORDER — SEMAGLUTIDE 1.34 MG/ML
0.5 INJECTION, SOLUTION SUBCUTANEOUS
Qty: 4.5 ML | Refills: 2 | Status: SHIPPED | OUTPATIENT
Start: 2024-09-04 | End: 2025-09-04

## 2024-09-06 ENCOUNTER — TELEPHONE (OUTPATIENT)
Dept: INTERNAL MEDICINE | Facility: CLINIC | Age: 49
End: 2024-09-06

## 2024-09-12 ENCOUNTER — PATIENT MESSAGE (OUTPATIENT)
Dept: ADMINISTRATIVE | Facility: HOSPITAL | Age: 49
End: 2024-09-12

## 2024-11-10 DIAGNOSIS — F41.9 ANXIETY: Chronic | ICD-10-CM

## 2024-11-12 RX ORDER — FLUOXETINE HYDROCHLORIDE 20 MG/1
20 CAPSULE ORAL DAILY
Qty: 90 CAPSULE | Refills: 0 | OUTPATIENT
Start: 2024-11-12

## 2024-11-12 RX ORDER — BUSPIRONE HYDROCHLORIDE 15 MG/1
15 TABLET ORAL 2 TIMES DAILY
Qty: 180 TABLET | Refills: 0 | OUTPATIENT
Start: 2024-11-12

## 2024-11-12 RX ORDER — LEVOTHYROXINE SODIUM 150 UG/1
150 TABLET ORAL DAILY
Qty: 90 TABLET | Refills: 0 | OUTPATIENT
Start: 2024-11-12

## 2024-11-20 ENCOUNTER — HOSPITAL ENCOUNTER (EMERGENCY)
Facility: HOSPITAL | Age: 49
Discharge: HOME | End: 2024-11-20
Attending: STUDENT IN AN ORGANIZED HEALTH CARE EDUCATION/TRAINING PROGRAM

## 2024-11-20 VITALS
BODY MASS INDEX: 31.58 KG/M2 | HEIGHT: 64 IN | SYSTOLIC BLOOD PRESSURE: 153 MMHG | RESPIRATION RATE: 20 BRPM | OXYGEN SATURATION: 100 % | TEMPERATURE: 98 F | HEART RATE: 78 BPM | WEIGHT: 185 LBS | DIASTOLIC BLOOD PRESSURE: 82 MMHG

## 2024-11-20 DIAGNOSIS — M79.605 LEFT LEG PAIN: ICD-10-CM

## 2024-11-20 DIAGNOSIS — T24.202A PARTIAL THICKNESS BURN OF LEFT LOWER EXTREMITY, INITIAL ENCOUNTER: ICD-10-CM

## 2024-11-20 DIAGNOSIS — L03.116 LEFT LEG CELLULITIS: Primary | ICD-10-CM

## 2024-11-20 LAB
ALBUMIN SERPL-MCNC: 4 G/DL (ref 3.5–5)
ALBUMIN/GLOB SERPL: 1.3 RATIO (ref 1.1–2)
ALP SERPL-CCNC: 55 UNIT/L (ref 40–150)
ALT SERPL-CCNC: 25 UNIT/L (ref 0–55)
ANION GAP SERPL CALC-SCNC: 10 MEQ/L
APTT PPP: 25.6 SECONDS (ref 23.2–33.7)
AST SERPL-CCNC: 18 UNIT/L (ref 5–34)
BASOPHILS # BLD AUTO: 0.04 X10(3)/MCL
BASOPHILS NFR BLD AUTO: 0.4 %
BILIRUB SERPL-MCNC: 0.8 MG/DL
BUN SERPL-MCNC: 7.6 MG/DL (ref 7–18.7)
CALCIUM SERPL-MCNC: 9.5 MG/DL (ref 8.4–10.2)
CHLORIDE SERPL-SCNC: 107 MMOL/L (ref 98–107)
CO2 SERPL-SCNC: 24 MMOL/L (ref 22–29)
CREAT SERPL-MCNC: 0.72 MG/DL (ref 0.55–1.02)
CREAT/UREA NIT SERPL: 11
EOSINOPHIL # BLD AUTO: 0.13 X10(3)/MCL (ref 0–0.9)
EOSINOPHIL NFR BLD AUTO: 1.4 %
ERYTHROCYTE [DISTWIDTH] IN BLOOD BY AUTOMATED COUNT: 13.5 % (ref 11.5–17)
GFR SERPLBLD CREATININE-BSD FMLA CKD-EPI: >60 ML/MIN/1.73/M2
GLOBULIN SER-MCNC: 3 GM/DL (ref 2.4–3.5)
GLUCOSE SERPL-MCNC: 139 MG/DL (ref 74–100)
HCT VFR BLD AUTO: 42.3 % (ref 37–47)
HGB BLD-MCNC: 14.3 G/DL (ref 12–16)
IMM GRANULOCYTES # BLD AUTO: 0.03 X10(3)/MCL (ref 0–0.04)
IMM GRANULOCYTES NFR BLD AUTO: 0.3 %
INR PPP: 1.2
LYMPHOCYTES # BLD AUTO: 1.4 X10(3)/MCL (ref 0.6–4.6)
LYMPHOCYTES NFR BLD AUTO: 15.3 %
MCH RBC QN AUTO: 29.1 PG (ref 27–31)
MCHC RBC AUTO-ENTMCNC: 33.8 G/DL (ref 33–36)
MCV RBC AUTO: 86 FL (ref 80–94)
MONOCYTES # BLD AUTO: 0.59 X10(3)/MCL (ref 0.1–1.3)
MONOCYTES NFR BLD AUTO: 6.5 %
NEUTROPHILS # BLD AUTO: 6.95 X10(3)/MCL (ref 2.1–9.2)
NEUTROPHILS NFR BLD AUTO: 76.1 %
NRBC BLD AUTO-RTO: 0 %
PLATELET # BLD AUTO: 182 X10(3)/MCL (ref 130–400)
PMV BLD AUTO: 11.7 FL (ref 7.4–10.4)
POTASSIUM SERPL-SCNC: 4 MMOL/L (ref 3.5–5.1)
PROT SERPL-MCNC: 7 GM/DL (ref 6.4–8.3)
PROTHROMBIN TIME: 14.7 SECONDS (ref 12.5–14.5)
RBC # BLD AUTO: 4.92 X10(6)/MCL (ref 4.2–5.4)
SODIUM SERPL-SCNC: 141 MMOL/L (ref 136–145)
WBC # BLD AUTO: 9.14 X10(3)/MCL (ref 4.5–11.5)

## 2024-11-20 PROCEDURE — 96372 THER/PROPH/DIAG INJ SC/IM: CPT | Performed by: PHYSICIAN ASSISTANT

## 2024-11-20 PROCEDURE — 85610 PROTHROMBIN TIME: CPT | Performed by: PHYSICIAN ASSISTANT

## 2024-11-20 PROCEDURE — 87040 BLOOD CULTURE FOR BACTERIA: CPT | Performed by: PHYSICIAN ASSISTANT

## 2024-11-20 PROCEDURE — 90715 TDAP VACCINE 7 YRS/> IM: CPT | Performed by: PHYSICIAN ASSISTANT

## 2024-11-20 PROCEDURE — 25000003 PHARM REV CODE 250: Performed by: PHYSICIAN ASSISTANT

## 2024-11-20 PROCEDURE — 87070 CULTURE OTHR SPECIMN AEROBIC: CPT | Performed by: PHYSICIAN ASSISTANT

## 2024-11-20 PROCEDURE — 90471 IMMUNIZATION ADMIN: CPT | Performed by: PHYSICIAN ASSISTANT

## 2024-11-20 PROCEDURE — 85730 THROMBOPLASTIN TIME PARTIAL: CPT | Performed by: PHYSICIAN ASSISTANT

## 2024-11-20 PROCEDURE — 63600175 PHARM REV CODE 636 W HCPCS: Performed by: PHYSICIAN ASSISTANT

## 2024-11-20 PROCEDURE — 99284 EMERGENCY DEPT VISIT MOD MDM: CPT | Mod: 25

## 2024-11-20 PROCEDURE — 85025 COMPLETE CBC W/AUTO DIFF WBC: CPT | Performed by: PHYSICIAN ASSISTANT

## 2024-11-20 PROCEDURE — 80053 COMPREHEN METABOLIC PANEL: CPT | Performed by: PHYSICIAN ASSISTANT

## 2024-11-20 RX ORDER — HYDROCODONE BITARTRATE AND ACETAMINOPHEN 10; 325 MG/1; MG/1
1 TABLET ORAL
Status: COMPLETED | OUTPATIENT
Start: 2024-11-20 | End: 2024-11-20

## 2024-11-20 RX ORDER — SILVER SULFADIAZINE 10 G/1000G
CREAM TOPICAL 2 TIMES DAILY
Qty: 30 G | Refills: 2 | Status: SHIPPED | OUTPATIENT
Start: 2024-11-20 | End: 2024-11-20

## 2024-11-20 RX ORDER — ONDANSETRON 4 MG/1
4 TABLET, FILM COATED ORAL EVERY 6 HOURS
Qty: 12 TABLET | Refills: 0 | Status: SHIPPED | OUTPATIENT
Start: 2024-11-20

## 2024-11-20 RX ORDER — SILVER SULFADIAZINE 10 G/1000G
CREAM TOPICAL 2 TIMES DAILY
Qty: 30 G | Refills: 2 | Status: SHIPPED | OUTPATIENT
Start: 2024-11-20 | End: 2024-11-30

## 2024-11-20 RX ORDER — SILVER SULFADIAZINE 10 G/1000G
1 CREAM TOPICAL
Status: COMPLETED | OUTPATIENT
Start: 2024-11-20 | End: 2024-11-20

## 2024-11-20 RX ORDER — HYDROCODONE BITARTRATE AND ACETAMINOPHEN 10; 325 MG/1; MG/1
1 TABLET ORAL EVERY 6 HOURS PRN
Qty: 12 TABLET | Refills: 0 | Status: SHIPPED | OUTPATIENT
Start: 2024-11-20

## 2024-11-20 RX ORDER — CEPHALEXIN 500 MG/1
500 CAPSULE ORAL 4 TIMES DAILY
Qty: 20 CAPSULE | Refills: 0 | Status: SHIPPED | OUTPATIENT
Start: 2024-11-20 | End: 2024-11-20

## 2024-11-20 RX ORDER — ONDANSETRON 4 MG/1
4 TABLET, FILM COATED ORAL EVERY 6 HOURS
Qty: 12 TABLET | Refills: 0 | Status: SHIPPED | OUTPATIENT
Start: 2024-11-20 | End: 2024-11-20

## 2024-11-20 RX ORDER — MORPHINE SULFATE 4 MG/ML
4 INJECTION, SOLUTION INTRAMUSCULAR; INTRAVENOUS
Status: COMPLETED | OUTPATIENT
Start: 2024-11-20 | End: 2024-11-20

## 2024-11-20 RX ORDER — HYDROCODONE BITARTRATE AND ACETAMINOPHEN 10; 325 MG/1; MG/1
1 TABLET ORAL EVERY 6 HOURS PRN
Qty: 12 TABLET | Refills: 0 | Status: SHIPPED | OUTPATIENT
Start: 2024-11-20 | End: 2024-11-20

## 2024-11-20 RX ORDER — CEPHALEXIN 500 MG/1
500 CAPSULE ORAL 4 TIMES DAILY
Qty: 20 CAPSULE | Refills: 0 | Status: SHIPPED | OUTPATIENT
Start: 2024-11-20 | End: 2024-11-25

## 2024-11-20 RX ORDER — ONDANSETRON HYDROCHLORIDE 2 MG/ML
4 INJECTION, SOLUTION INTRAVENOUS
Status: COMPLETED | OUTPATIENT
Start: 2024-11-20 | End: 2024-11-20

## 2024-11-20 RX ADMIN — MORPHINE SULFATE 4 MG: 4 INJECTION INTRAVENOUS at 06:11

## 2024-11-20 RX ADMIN — ONDANSETRON 4 MG: 2 INJECTION INTRAMUSCULAR; INTRAVENOUS at 06:11

## 2024-11-20 RX ADMIN — HYDROCODONE BITARTRATE AND ACETAMINOPHEN 1 TABLET: 10; 325 TABLET ORAL at 04:11

## 2024-11-20 RX ADMIN — SILVER SULFADIAZINE 1 TUBE: 10 CREAM TOPICAL at 06:11

## 2024-11-20 RX ADMIN — TETANUS TOXOID, REDUCED DIPHTHERIA TOXOID AND ACELLULAR PERTUSSIS VACCINE, ADSORBED 0.5 ML: 5; 2.5; 8; 8; 2.5 SUSPENSION INTRAMUSCULAR at 04:11
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
## 2024-11-20 NOTE — Clinical Note
"Treasure PIERREDYAN العراقي was seen and treated in our emergency department on 11/20/2024.  She may return to work on 11/25/2024.  Please excuse for up to 48hours prior for symptoms present at that time if possible. Patient may also return sooner than above date if symptoms improve/resolve.       If you have any questions or concerns, please don't hesitate to call.      Ashley Morales PA"
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
## 2024-11-23 LAB — BACTERIA WND CULT: ABNORMAL

## 2024-11-25 LAB
BACTERIA BLD CULT: NORMAL
BACTERIA BLD CULT: NORMAL

## 2025-01-14 NOTE — PROGRESS NOTES
"CHIEF COMPLAINT: No chief complaint on file.                                                 HPI:  Zahira العراقي 49 y.o. female with history of bioprosthetic aortic valve replacement in 2012 2/2 endocarditis with revisional mechanical aortic valve replacement in October 2018 presents for follow up.  At last office visit patient stated that she felt well from a cardiac standpoint.  At that time, she had still not followed up with Coumadin clinic.  Since then patient has not reestablished care with them and has not been monitoring her Coumadin.  She stated that she went to the ED recently and reported that her INR was normal, per chart review, INR was actually 1.2.  Given the presence of a mechanical aortic valve, this is not therapeutic.  Strongly encouraged patient to follow up with Coumadin Clinic for Coumadin management.  She states that she is going to "get serious" about this.     Today the patient states that she feels stable from a cardiac standpoint.  She notes an increase in palpitations over the last 1-2 weeks.  She states that she has been out of her antidepressant and her thyroid medications for at least 1 month.  She was supposed to get these refilled at a follow up with her PCP, but due to scheduling issues, that appointment was canceled.  She states that PCP did not refill medications for her.  Strongly advised her to reach out to PCP to see if they can at least give her refills until her next follow up with them, otherwise, she will need to go to urgent care or ED for refills.  She has not had any updated thyroid labs in a year.  Otherwise she denies any further complaints such as SOB, LOPEZ, chest pain, PND, orthopnea, lightheadedness, dizziness, syncope, lower extremity edema, or claudication symptoms.  She states that she is able to complete her ADLs and her job duties without any issues or ischemic symptoms.  She states that she typically takes her medications, however she has been out of " them for several weeks.  Questionable compliance with Coumadin.  It is not being managed well at this time.  Had lengthy discussion with the patient regarding the importance of Coumadin management, she did voiced understanding.  She tries to follow a Coumadin friendly diet.  She denies any tobacco or other illicit drug use.                                                                                                                                                                                                                                                                                                                                                                                                                                             CARDIAC TESTING:  Echo 6.12.23  · Normal right ventricular size with normal right ventricular systolic function.  · There is a bioprosthetic aortic valve present.  · The aortic valve mean gradient is 13 mmHg with a dimensionless index of 0.52.  · Normal central venous pressure (3 mmHg).  · The estimated PA systolic pressure is 37 mmHg.  · The left ventricle is normal in size with normal systolic function.  · The estimated ejection fraction is 60%.  · Normal left ventricular diastolic function.  · There is mild pulmonary hypertension.    Echocardiogram June 4, 2021:  Left ventricular ejection fraction is measured at approximately 60%.  Structurally normal mitral valve.  Trace mitral regurgitation.  Normally functioning mechanical prosthetic valve in aortic position.  Mild aortic regurgitation present.  Peak velocity across the aortic valve is 2.6m/sec. Peak gradient is 27.27 mmHg. Mean gradient is 13.54mmHg.  Tricuspid valve is structurally normal.  There is mild tricuspid regurgitation with RVSP estimated 49 mmHg.  Mitral valve is structurally normal.  Moderately dilated left atrium.  Mildly dilated right atrium.  Right ventricle global systolic function is mildly  reduced.  IVC normal.    Echocardiogram 2020:  EF 55 to 65%. Normal left ventricle diastolic function.  Mild tricuspid regurgitation  Mild pulmonary hypertension present  Aortic valve: There is a mechanical valve present  Prosthetic aortic valve function is normal.    TTE (2018):  Left ventricular ejection fraction is measured at approximately 65%.  Right ventricle global systolic function is mildly reduced.  Normal size right ventricle cavity.  Mechanical prosthetic valve present in the aortic position.  Aortic valve mean gradient of 12.85 mm Hg.        Patient Active Problem List   Diagnosis    H/O mechanical aortic valve replacement    History of endocarditis    Postoperative atrial fibrillation    Type 2 diabetes mellitus without complication, without long-term current use of insulin    Hyperlipidemia    Mitral valve prolapse    Primary hypertension    Hypothyroid    Anxiety    Bilateral hand pain    Acute pain of right knee    Vitamin D deficiency    Joint pain    Cervical cancer screening    Class 1 obesity due to excess calories with serious comorbidity and body mass index (BMI) of 33.0 to 33.9 in adult    Non compliance with medical treatment     Past Surgical History:   Procedure Laterality Date    AORTIC VALVE REPLACEMENT N/A     CARDIAC VALVE REPLACEMENT       SECTION      3     TUBAL LIGATION       Social History     Socioeconomic History    Marital status:    Tobacco Use    Smoking status: Former     Types: Cigarettes    Smokeless tobacco: Never   Substance and Sexual Activity    Alcohol use: Never    Drug use: Never     Types: Marijuana    Sexual activity: Yes     Partners: Male     Birth control/protection: None   Social History Narrative    ** Merged History Encounter **         ** Merged History Encounter **          Social Drivers of Health     Financial Resource Strain: Low Risk  (2024)    Overall Financial Resource Strain (CARDIA)     Difficulty of Paying Living  Expenses: Not very hard   Food Insecurity: Food Insecurity Present (4/19/2024)    Hunger Vital Sign     Worried About Running Out of Food in the Last Year: Sometimes true     Ran Out of Food in the Last Year: Never true   Transportation Needs: Unmet Transportation Needs (4/19/2024)    PRAPARE - Transportation     Lack of Transportation (Medical): Yes     Lack of Transportation (Non-Medical): No   Physical Activity: Sufficiently Active (4/19/2024)    Exercise Vital Sign     Days of Exercise per Week: 5 days     Minutes of Exercise per Session: 50 min   Recent Concern: Physical Activity - Inactive (2/22/2024)    Exercise Vital Sign     Days of Exercise per Week: 0 days     Minutes of Exercise per Session: 0 min   Stress: No Stress Concern Present (4/19/2024)    [employer] - Occupational Stress Questionnaire     Feeling of Stress : Only a little   Recent Concern: Stress - Stress Concern Present (2/22/2024)    [employer] - Occupational Stress Questionnaire     Feeling of Stress : To some extent   Housing Stability: Low Risk  (2/22/2024)    Housing Stability Vital Sign     Unable to Pay for Housing in the Last Year: No     Number of Places Lived in the Last Year: 2     Unstable Housing in the Last Year: No        Family History   Problem Relation Name Age of Onset    Diabetes Mother Maryam singh     Aneurysm Mother Maryam singh     Stroke Mother Maryam singh     Early death Mother Maryam singh     No Known Problems Father      Breast cancer Maternal Grandmother Myrna 50        diagnosed twice: 50y,60y    Stomach cancer Maternal Grandmother Myrna 70    Arthritis Maternal Grandmother Myrna     Breast cancer Paternal Grandmother      Brain cancer Paternal Grandmother      Lymphoma Paternal Grandfather       Review of patient's allergies indicates:   Allergen Reactions    Lamotrigine Swelling    Ondansetron hcl Swelling    Ondansetron Rash    Ondansetron hcl (pf) Rash          ROS:  Review of Systems   Constitutional: Negative.  Negative for malaise/fatigue.   HENT: Negative.     Eyes: Negative.    Respiratory: Negative.  Negative for shortness of breath.    Cardiovascular:  Positive for palpitations. Negative for chest pain, orthopnea, claudication, leg swelling and PND.   Gastrointestinal: Negative.    Genitourinary: Negative.    Musculoskeletal: Negative.    Skin: Negative.    Neurological: Negative.    Endo/Heme/Allergies: Negative.    Psychiatric/Behavioral: Negative.                                                                                                                                                                                  Negative except as stated in the history of present illness. See HPI for details.    PHYSICAL EXAM:  There were no vitals taken for this visit.    Physical Exam  HENT:      Head: Normocephalic.      Nose: Nose normal.      Mouth/Throat:      Mouth: Mucous membranes are moist.   Eyes:      Extraocular Movements: Extraocular movements intact.   Neck:      Vascular: No carotid bruit.   Cardiovascular:      Rate and Rhythm: Normal rate and regular rhythm.      Pulses: Normal pulses.      Heart sounds: No murmur heard.  Pulmonary:      Effort: Pulmonary effort is normal.   Abdominal:      General: There is no distension.   Musculoskeletal:         General: Normal range of motion.      Cervical back: Normal range of motion.      Right lower leg: Edema (Mild) present.      Left lower leg: Edema (Mild) present.   Skin:     General: Skin is warm.   Neurological:      General: No focal deficit present.      Mental Status: She is alert and oriented to person, place, and time.   Psychiatric:         Mood and Affect: Mood normal.       Current Outpatient Medications   Medication Instructions    albuterol (PROVENTIL/VENTOLIN HFA) 90 mcg/actuation inhaler 2 puffs, Inhalation, Every 6 hours PRN, Rescue    albuterol-ipratropium (DUO-NEB) 2.5 mg-0.5 mg/3 mL  nebulizer solution Nebulization, Every 6 hours PRN    aspirin (ECOTRIN) 81 mg, Oral, Daily    atorvastatin (LIPITOR) 20 mg, Oral, Daily    busPIRone (BUSPAR) 15 mg, Oral, 2 times daily    ergocalciferol (ERGOCALCIFEROL) 50,000 Units, Oral, Every 7 days    famotidine (PEPCID) 40 mg, Oral, 2 times daily PRN    ferrous sulfate (FEOSOL) 325 mg, Oral, 3 times daily    FLUoxetine 20 mg, Oral, Daily    fluticasone propionate (FLONASE) 50 mcg, Each Nostril, Daily    HYDROcodone-acetaminophen (NORCO)  mg per tablet 1 tablet, Oral, Every 6 hours PRN    levothyroxine (SYNTHROID) 150 mcg, Oral, Daily    metFORMIN (GLUCOPHAGE) 1,000 mg, Oral, 2 times daily    metoprolol succinate (TOPROL-XL) 50 mg, Oral, Daily    ondansetron (ZOFRAN) 4 mg, Oral, Every 6 hours    OZEMPIC 0.5 mg, Subcutaneous, Every 7 days    triamterene-hydrochlorothiazide 37.5-25 mg (DYAZIDE) 37.5-25 mg per capsule 1 capsule, Oral, Every morning    warfarin (COUMADIN) 6 MG tablet Take 1 tablet oral at bedtime.    warfarin (COUMADIN) 5 mg, Oral, Daily        All medications, laboratory studies, cardiac diagnostic imaging reviewed.     Lab Results   Component Value Date    .00 02/01/2024    LDL 59.00 12/01/2022    TRIG 137 02/01/2024    TRIG 98 12/01/2022    CREATININE 0.72 11/20/2024    MG 1.55 (L) 05/19/2021    K 4.0 11/20/2024        ASSESSMENT/PLAN:  Mechanical Aortic Valve Replacement 2/2 failure of bioprosthetic aortic valve  Mechanical aortic valve s/p replacement in Oct 2018  Hx of endocarditis  Postop AFib-no recurrence  Asymptomatic  Echo from June 2023 revealed preserved EF and normally functioning mechanical aortic valve.   INR done in ED in November 2024- INR was 1.2 at that time   Will order updated INR- still did not complete   Patient has not been following with Coumadin clinic at [facility].    Strongly encouraged patient to call and reschedule appointment   Re-referring to coumadin clinic today *  She states that she has been taking her  "Coumadin as it was previously prescribed and she follows the recommended diet"  Continue ASA  Regular rate and rhythm on auscultation today   Had lengthy discussion on compliance with medications   Strict ED precautions given     DM  Last A1C -6.6 on 2.20.24  On ozempic and metformin   Continue management per PCP      HLP  LDL goal <70 given DM    today 2/1/2024  Pt reports she has not been consistently taking statin and would like to try to take it better first before we increase the doseFLP/CMP ordered at last office visit, but patient did not complete - reminded today   Reminded patient to complete lab work today   Counseled on importance of following a low-cholesterol, low-fat diet and exercise as tolerated     HTN  BP   Will continue to monitor, with Dyazide 37.5-25mg daily  Counseled on importance of following a low-sodium, heart healthy diet and exercise as tolerated    Obesity  Patient continuing to lose weight with diet, exercise, and Ozempic      Needs refills per PCP **has been out of thyroid medications and anti-depressants**      Will re-refer to coumadin clinic   Complete updated echocardiogram   Complete INR and FLP   Follow up in cardiology clinic in 3 months or sooner if needed   Follow up with PCP as directed   Please notify clinic if any new concerns or any change in symptoms    "

## 2025-01-15 ENCOUNTER — TELEPHONE (OUTPATIENT)
Dept: INTERNAL MEDICINE | Facility: CLINIC | Age: 50
End: 2025-01-15
Payer: MEDICAID

## 2025-01-15 ENCOUNTER — OFFICE VISIT (OUTPATIENT)
Dept: CARDIOLOGY | Facility: CLINIC | Age: 50
End: 2025-01-15
Payer: MEDICAID

## 2025-01-15 ENCOUNTER — LAB VISIT (OUTPATIENT)
Dept: LAB | Facility: HOSPITAL | Age: 50
End: 2025-01-15
Payer: MEDICAID

## 2025-01-15 VITALS
TEMPERATURE: 98 F | RESPIRATION RATE: 18 BRPM | DIASTOLIC BLOOD PRESSURE: 80 MMHG | HEART RATE: 72 BPM | SYSTOLIC BLOOD PRESSURE: 138 MMHG | BODY MASS INDEX: 33.84 KG/M2 | HEIGHT: 64 IN | WEIGHT: 198.19 LBS | OXYGEN SATURATION: 96 %

## 2025-01-15 DIAGNOSIS — E03.9 HYPOTHYROIDISM, UNSPECIFIED TYPE: Chronic | ICD-10-CM

## 2025-01-15 DIAGNOSIS — Z95.2 H/O MECHANICAL AORTIC VALVE REPLACEMENT: Chronic | ICD-10-CM

## 2025-01-15 DIAGNOSIS — F41.9 ANXIETY: Primary | Chronic | ICD-10-CM

## 2025-01-15 DIAGNOSIS — I48.91 POSTOPERATIVE ATRIAL FIBRILLATION: ICD-10-CM

## 2025-01-15 DIAGNOSIS — E78.5 HYPERLIPIDEMIA, UNSPECIFIED HYPERLIPIDEMIA TYPE: ICD-10-CM

## 2025-01-15 DIAGNOSIS — I10 PRIMARY HYPERTENSION: Primary | Chronic | ICD-10-CM

## 2025-01-15 DIAGNOSIS — E11.9 TYPE 2 DIABETES MELLITUS WITHOUT COMPLICATION, WITHOUT LONG-TERM CURRENT USE OF INSULIN: Chronic | ICD-10-CM

## 2025-01-15 DIAGNOSIS — Z86.79 HISTORY OF ENDOCARDITIS: ICD-10-CM

## 2025-01-15 DIAGNOSIS — I34.1 MITRAL VALVE PROLAPSE: ICD-10-CM

## 2025-01-15 DIAGNOSIS — E78.2 MIXED HYPERLIPIDEMIA: Chronic | ICD-10-CM

## 2025-01-15 DIAGNOSIS — I97.89 POSTOPERATIVE ATRIAL FIBRILLATION: ICD-10-CM

## 2025-01-15 LAB
CHOLEST SERPL-MCNC: 114 MG/DL
CHOLEST/HDLC SERPL: 2 {RATIO} (ref 0–5)
HDLC SERPL-MCNC: 46 MG/DL (ref 35–60)
INR PPP: 1.5
LDLC SERPL CALC-MCNC: 31 MG/DL (ref 50–140)
PROTHROMBIN TIME: 18.4 SECONDS (ref 11.4–14)
TRIGL SERPL-MCNC: 183 MG/DL (ref 37–140)
VLDLC SERPL CALC-MCNC: 37 MG/DL

## 2025-01-15 PROCEDURE — 93005 ELECTROCARDIOGRAM TRACING: CPT

## 2025-01-15 PROCEDURE — 99214 OFFICE O/P EST MOD 30 MIN: CPT | Mod: S$PBB,,,

## 2025-01-15 PROCEDURE — 3079F DIAST BP 80-89 MM HG: CPT | Mod: CPTII,,,

## 2025-01-15 PROCEDURE — 80061 LIPID PANEL: CPT

## 2025-01-15 PROCEDURE — 1160F RVW MEDS BY RX/DR IN RCRD: CPT | Mod: CPTII,,,

## 2025-01-15 PROCEDURE — 3075F SYST BP GE 130 - 139MM HG: CPT | Mod: CPTII,,,

## 2025-01-15 PROCEDURE — 1159F MED LIST DOCD IN RCRD: CPT | Mod: CPTII,,,

## 2025-01-15 PROCEDURE — 99215 OFFICE O/P EST HI 40 MIN: CPT | Mod: PBBFAC,25

## 2025-01-15 PROCEDURE — 85610 PROTHROMBIN TIME: CPT

## 2025-01-15 PROCEDURE — 3008F BODY MASS INDEX DOCD: CPT | Mod: CPTII,,,

## 2025-01-15 PROCEDURE — 36415 COLL VENOUS BLD VENIPUNCTURE: CPT

## 2025-01-15 RX ORDER — FLUOXETINE HYDROCHLORIDE 20 MG/1
20 CAPSULE ORAL DAILY
Qty: 30 CAPSULE | Refills: 0 | Status: SHIPPED | OUTPATIENT
Start: 2025-01-15 | End: 2025-02-14

## 2025-01-15 RX ORDER — LEVOTHYROXINE SODIUM 150 UG/1
150 TABLET ORAL DAILY
Qty: 30 TABLET | Refills: 0 | Status: SHIPPED | OUTPATIENT
Start: 2025-01-15 | End: 2025-02-14

## 2025-01-15 NOTE — PATIENT INSTRUCTIONS
Complete updated echocardiogram   Complete INR and FLP   Follow up in cardiology clinic in 3 months or sooner if needed   Follow up with PCP as directed   Please notify clinic if any new concerns or any change in symptoms

## 2025-01-16 LAB
OHS QRS DURATION: 94 MS
OHS QTC CALCULATION: 440 MS

## 2025-01-16 NOTE — TELEPHONE ENCOUNTER
----- Message from JIGAR Madison sent at 1/15/2025 10:50 AM CST -----  Regarding: FW: med refill    ----- Message -----  From: Raysa Fisher MA  Sent: 1/15/2025  10:26 AM CST  To: JIGAR Lemus  Subject: FW: med refill                                     ----- Message -----  From: Aniya Godfrey  Sent: 1/15/2025  10:15 AM CST  To: Roman CANALES Staff  Subject: med refill                                                Preferred Pharmacy: [facility]    Last Visit:04/19/24  Next Visit: 01/28/25 Robi         1. Name of Medication: Levothyroxine   Dosage:150mcg           2. Name of Medication: fluoxetine    Dosage:20 mg    Comments:      3.Name of Medication: vitamin d  Dosage:1.25 mg

## 2025-01-28 ENCOUNTER — HOSPITAL ENCOUNTER (OUTPATIENT)
Dept: RADIOLOGY | Facility: HOSPITAL | Age: 50
Discharge: HOME | End: 2025-01-28
Attending: INTERNAL MEDICINE
Payer: MEDICAID

## 2025-01-28 ENCOUNTER — OFFICE VISIT (OUTPATIENT)
Dept: INTERNAL MEDICINE | Facility: CLINIC | Age: 50
End: 2025-01-28
Payer: MEDICAID

## 2025-01-28 VITALS
OXYGEN SATURATION: 98 % | TEMPERATURE: 98 F | BODY MASS INDEX: 32.47 KG/M2 | SYSTOLIC BLOOD PRESSURE: 137 MMHG | RESPIRATION RATE: 20 BRPM | DIASTOLIC BLOOD PRESSURE: 87 MMHG | WEIGHT: 190.19 LBS | HEART RATE: 86 BPM | HEIGHT: 64 IN

## 2025-01-28 DIAGNOSIS — E11.29 MICROALBUMINURIA DUE TO TYPE 2 DIABETES MELLITUS: ICD-10-CM

## 2025-01-28 DIAGNOSIS — E55.9 VITAMIN D DEFICIENCY: ICD-10-CM

## 2025-01-28 DIAGNOSIS — R80.9 MICROALBUMINURIA DUE TO TYPE 2 DIABETES MELLITUS: ICD-10-CM

## 2025-01-28 DIAGNOSIS — M25.511 CHRONIC RIGHT SHOULDER PAIN: ICD-10-CM

## 2025-01-28 DIAGNOSIS — E11.65 TYPE 2 DIABETES MELLITUS WITH HYPERGLYCEMIA, WITHOUT LONG-TERM CURRENT USE OF INSULIN: ICD-10-CM

## 2025-01-28 DIAGNOSIS — G89.29 CHRONIC RIGHT SHOULDER PAIN: ICD-10-CM

## 2025-01-28 DIAGNOSIS — Z12.39 ENCOUNTER FOR SCREENING FOR MALIGNANT NEOPLASM OF BREAST, UNSPECIFIED SCREENING MODALITY: ICD-10-CM

## 2025-01-28 DIAGNOSIS — E11.9 TYPE 2 DIABETES MELLITUS WITHOUT COMPLICATION, WITHOUT LONG-TERM CURRENT USE OF INSULIN: ICD-10-CM

## 2025-01-28 DIAGNOSIS — J40 BRONCHITIS: ICD-10-CM

## 2025-01-28 DIAGNOSIS — Z12.11 COLON CANCER SCREENING: Primary | ICD-10-CM

## 2025-01-28 LAB
CREAT UR-MCNC: 157.4 MG/DL (ref 45–106)
MICROALBUMIN UR-MCNC: 94.3 UG/ML
MICROALBUMIN/CREAT RATIO PNL UR: 59.9 MG/GM CR (ref 0–30)

## 2025-01-28 PROCEDURE — 99215 OFFICE O/P EST HI 40 MIN: CPT | Mod: PBBFAC,25 | Performed by: INTERNAL MEDICINE

## 2025-01-28 PROCEDURE — 73030 X-RAY EXAM OF SHOULDER: CPT | Mod: TC,RT

## 2025-01-28 PROCEDURE — 82570 ASSAY OF URINE CREATININE: CPT | Performed by: INTERNAL MEDICINE

## 2025-01-28 RX ORDER — ALBUTEROL SULFATE 90 UG/1
2 INHALANT RESPIRATORY (INHALATION) EVERY 6 HOURS PRN
Qty: 18 G | Refills: 2 | Status: SHIPPED | OUTPATIENT
Start: 2025-01-28

## 2025-01-28 RX ORDER — SEMAGLUTIDE 1.34 MG/ML
1 INJECTION, SOLUTION SUBCUTANEOUS
Qty: 3 ML | Refills: 11 | Status: SHIPPED | OUTPATIENT
Start: 2025-01-28 | End: 2026-01-28
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
## 2025-01-29 ENCOUNTER — ANTI-COAG VISIT (OUTPATIENT)
Dept: CARDIOLOGY | Facility: HOSPITAL | Age: 50
End: 2025-01-29
Payer: MEDICAID

## 2025-01-29 DIAGNOSIS — Z95.2 H/O MECHANICAL AORTIC VALVE REPLACEMENT: ICD-10-CM

## 2025-01-29 DIAGNOSIS — Z79.01 ANTICOAGULATION MONITORING, INR RANGE 2.5-3.5: Primary | ICD-10-CM

## 2025-01-29 DIAGNOSIS — I48.0 PAROXYSMAL ATRIAL FIBRILLATION: ICD-10-CM

## 2025-01-29 LAB
CTP QC/QA: YES
INR PPP: 1.4 (ref 2–3)
PROTHROMBIN TIME, POC: ABNORMAL (ref 2–3)

## 2025-01-29 PROCEDURE — 99211 OFF/OP EST MAY X REQ PHY/QHP: CPT

## 2025-01-29 PROCEDURE — 85610 PROTHROMBIN TIME: CPT

## 2025-01-29 NOTE — PROGRESS NOTES
Pt returning to the Coumadin Clinic today.  She is in good spirits.  She states she is still on 6 mg of warfarin daily. POC pt/inr performed.  Pt will take 9 mg of warfarin daily for 3 days, then resume dosing.  She will return Monday, Feb. 3 rd for retest.

## 2025-01-29 NOTE — PROGRESS NOTES
Coumadin Clinic   The patient had INR checked to evaluate for adequate anticoagulation.  Please see labs below:    Lab Results   Component Value Date    INR 1.4 (A) 01/29/2025    INR 1.5 (H) 01/15/2025    INR 1.2 11/20/2024     Lab Results   Component Value Date    WBC 9.14 11/20/2024    HGB 14.3 11/20/2024    HCT 42.3 11/20/2024    MCV 86.0 11/20/2024     11/20/2024         ASSESS:  The patient is below the recommended INR range 2.5 to 3.5  The H/H is in the normal range.     REC:  Adjust coumadin dose and periodically recheck INR    Darrius Richter MD

## 2025-01-30 RX ORDER — LISINOPRIL 10 MG/1
10 TABLET ORAL DAILY
Qty: 90 TABLET | Refills: 3 | Status: SHIPPED | OUTPATIENT
Start: 2025-01-30 | End: 2026-01-30

## 2025-02-03 ENCOUNTER — ANTI-COAG VISIT (OUTPATIENT)
Dept: CARDIOLOGY | Facility: HOSPITAL | Age: 50
End: 2025-02-03
Payer: MEDICAID

## 2025-02-03 DIAGNOSIS — Z95.2 H/O MECHANICAL AORTIC VALVE REPLACEMENT: ICD-10-CM

## 2025-02-03 DIAGNOSIS — Z79.01 ANTICOAGULATION MONITORING, INR RANGE 2.5-3.5: Primary | ICD-10-CM

## 2025-02-03 DIAGNOSIS — I48.0 PAROXYSMAL ATRIAL FIBRILLATION: ICD-10-CM

## 2025-02-03 LAB
CTP QC/QA: YES
INR PPP: 2.4 (ref 2–3)
PROTHROMBIN TIME, POC: NORMAL (ref 2–3)

## 2025-02-03 PROCEDURE — 85610 PROTHROMBIN TIME: CPT

## 2025-02-03 PROCEDURE — 99211 OFF/OP EST MAY X REQ PHY/QHP: CPT

## 2025-02-03 NOTE — PROGRESS NOTES
Coumadin Clinic   The patient had INR checked to evaluate for adequate anticoagulation.  Please see labs below:    Lab Results   Component Value Date    INR 2.4 02/03/2025    INR 1.4 (A) 01/29/2025    INR 1.5 (H) 01/15/2025     Lab Results   Component Value Date    WBC 9.14 11/20/2024    HGB 14.3 11/20/2024    HCT 42.3 11/20/2024    MCV 86.0 11/20/2024     11/20/2024         ASSESS:  The patient is below the recommended INR range 2.5 to 3.5  The H/H is in the normal range.     REC:  Adjust coumadin dose and periodically recheck INR    Darrius Richter MD

## 2025-02-12 ENCOUNTER — ANTI-COAG VISIT (OUTPATIENT)
Dept: CARDIOLOGY | Facility: HOSPITAL | Age: 50
End: 2025-02-12
Payer: MEDICAID

## 2025-02-12 DIAGNOSIS — Z95.2 H/O MECHANICAL AORTIC VALVE REPLACEMENT: ICD-10-CM

## 2025-02-12 DIAGNOSIS — I48.0 PAROXYSMAL ATRIAL FIBRILLATION: ICD-10-CM

## 2025-02-12 DIAGNOSIS — Z79.01 ANTICOAGULATION MONITORING, INR RANGE 2.5-3.5: Primary | ICD-10-CM

## 2025-02-12 LAB
CTP QC/QA: YES
INR PPP: 4.4 (ref 2–3)
PROTHROMBIN TIME, POC: ABNORMAL (ref 2–3)

## 2025-02-12 PROCEDURE — 85610 PROTHROMBIN TIME: CPT

## 2025-02-12 NOTE — PROGRESS NOTES
Coumadin Clinic   The patient had INR checked to evaluate for adequate anticoagulation.  Please see labs below:    Lab Results   Component Value Date    INR 4.4 (A) 02/12/2025    INR 2.4 02/03/2025    INR 1.4 (A) 01/29/2025     Lab Results   Component Value Date    WBC 9.14 11/20/2024    HGB 14.3 11/20/2024    HCT 42.3 11/20/2024    MCV 86.0 11/20/2024     11/20/2024         ASSESS:  The patient is above the recommended INR range 2.5 to 3.5  The H/H is in the normal range.     REC:  Adjust coumadin dose and periodically recheck INR    Darrius Richter MD

## 2025-02-17 DIAGNOSIS — E03.9 HYPOTHYROIDISM, UNSPECIFIED TYPE: Chronic | ICD-10-CM

## 2025-02-17 DIAGNOSIS — F41.9 ANXIETY: Chronic | ICD-10-CM

## 2025-02-18 DIAGNOSIS — I48.0 PAROXYSMAL ATRIAL FIBRILLATION: Chronic | ICD-10-CM

## 2025-02-18 DIAGNOSIS — I35.2 AORTIC VALVE STENOSIS WITH INSUFFICIENCY, ETIOLOGY OF CARDIAC VALVE DISEASE UNSPECIFIED: ICD-10-CM

## 2025-02-18 DIAGNOSIS — I10 PRIMARY HYPERTENSION: Chronic | ICD-10-CM

## 2025-02-18 RX ORDER — ATORVASTATIN CALCIUM 20 MG/1
20 TABLET, FILM COATED ORAL DAILY
Qty: 90 TABLET | Refills: 1 | Status: SHIPPED | OUTPATIENT
Start: 2025-02-18 | End: 2025-08-17

## 2025-02-18 RX ORDER — LEVOTHYROXINE SODIUM 150 UG/1
150 TABLET ORAL DAILY
Qty: 90 TABLET | Refills: 3 | Status: SHIPPED | OUTPATIENT
Start: 2025-02-18

## 2025-02-18 RX ORDER — METOPROLOL SUCCINATE 50 MG/1
50 TABLET, EXTENDED RELEASE ORAL DAILY
Qty: 90 TABLET | Refills: 1 | Status: SHIPPED | OUTPATIENT
Start: 2025-02-18 | End: 2026-02-18

## 2025-02-18 RX ORDER — FLUOXETINE HYDROCHLORIDE 20 MG/1
20 CAPSULE ORAL DAILY
Qty: 90 CAPSULE | Refills: 3 | Status: SHIPPED | OUTPATIENT
Start: 2025-02-18

## 2025-02-18 RX ORDER — TRIAMTERENE AND HYDROCHLOROTHIAZIDE 37.5; 25 MG/1; MG/1
1 CAPSULE ORAL EVERY MORNING
Qty: 90 CAPSULE | Refills: 1 | Status: SHIPPED | OUTPATIENT
Start: 2025-02-18

## 2025-03-06 ENCOUNTER — ANTI-COAG VISIT (OUTPATIENT)
Dept: CARDIOLOGY | Facility: HOSPITAL | Age: 50
End: 2025-03-06
Payer: MEDICAID

## 2025-03-06 DIAGNOSIS — Z95.2 H/O MECHANICAL AORTIC VALVE REPLACEMENT: Primary | ICD-10-CM

## 2025-03-06 DIAGNOSIS — Z79.01 ANTICOAGULATION MONITORING, INR RANGE 2.5-3.5: ICD-10-CM

## 2025-03-06 LAB
CTP QC/QA: YES
INR PPP: 2.3 (ref 2–3)
PROTHROMBIN TIME, POC: NORMAL (ref 2–3)

## 2025-03-06 PROCEDURE — 99211 OFF/OP EST MAY X REQ PHY/QHP: CPT

## 2025-03-06 NOTE — PROGRESS NOTES
POC pt/inr performed.  Pt will take 9 mg of warfarin today, then will resume with increased dosing.

## 2025-03-11 ENCOUNTER — HOSPITAL ENCOUNTER (OUTPATIENT)
Dept: CARDIOLOGY | Facility: HOSPITAL | Age: 50
Discharge: HOME | End: 2025-03-11
Payer: MEDICAID

## 2025-03-11 VITALS
DIASTOLIC BLOOD PRESSURE: 86 MMHG | HEIGHT: 64 IN | BODY MASS INDEX: 32.44 KG/M2 | SYSTOLIC BLOOD PRESSURE: 138 MMHG | WEIGHT: 190 LBS

## 2025-03-11 DIAGNOSIS — I34.1 MITRAL VALVE PROLAPSE: ICD-10-CM

## 2025-03-11 DIAGNOSIS — Z95.2 H/O MECHANICAL AORTIC VALVE REPLACEMENT: Chronic | ICD-10-CM

## 2025-03-11 DIAGNOSIS — Z86.79 HISTORY OF ENDOCARDITIS: ICD-10-CM

## 2025-03-11 PROCEDURE — 93306 TTE W/DOPPLER COMPLETE: CPT

## 2025-03-12 LAB
APICAL FOUR CHAMBER EJECTION FRACTION: 66 %
APICAL TWO CHAMBER EJECTION FRACTION: 63 %
AV INDEX (PROSTH): 0.56
AV MEAN GRADIENT: 8 MMHG
AV PEAK GRADIENT: 14 MMHG
AV VALVE AREA BY VELOCITY RATIO: 2.2 CM²
AV VALVE AREA: 2.3 CM²
AV VELOCITY RATIO: 0.53
BSA FOR ECHO PROCEDURE: 1.97 M2
CV ECHO LV RWT: 0.51 CM
DOP CALC AO PEAK VEL: 1.9 M/S
DOP CALC AO VTI: 40.8 CM
DOP CALC LVOT AREA: 4.2 CM2
DOP CALC LVOT DIAMETER: 2.3 CM
DOP CALC LVOT PEAK VEL: 1 M/S
DOP CALC LVOT STROKE VOLUME: 95.5 CM3
DOP CALC MV VTI: 21.7 CM
DOP CALCLVOT PEAK VEL VTI: 23 CM
E WAVE DECELERATION TIME: 146 MSEC
E/A RATIO: 1.13
E/E' RATIO: 7 M/S
ECHO LV POSTERIOR WALL: 1.1 CM (ref 0.6–1.1)
FRACTIONAL SHORTENING: 34.9 % (ref 28–44)
INTERVENTRICULAR SEPTUM: 0.9 CM (ref 0.6–1.1)
LEFT ATRIUM SIZE: 3.7 CM
LEFT INTERNAL DIMENSION IN SYSTOLE: 2.8 CM (ref 2.1–4)
LEFT VENTRICLE DIASTOLIC VOLUME INDEX: 42.41 ML/M2
LEFT VENTRICLE DIASTOLIC VOLUME: 81 ML
LEFT VENTRICLE END DIASTOLIC VOLUME APICAL 2 CHAMBER: 89.72 ML
LEFT VENTRICLE END DIASTOLIC VOLUME APICAL 4 CHAMBER: 117.59 ML
LEFT VENTRICLE MASS INDEX: 74.6 G/M2
LEFT VENTRICLE SYSTOLIC VOLUME INDEX: 15.7 ML/M2
LEFT VENTRICLE SYSTOLIC VOLUME: 30 ML
LEFT VENTRICULAR INTERNAL DIMENSION IN DIASTOLE: 4.3 CM (ref 3.5–6)
LEFT VENTRICULAR MASS: 142.5 G
LV LATERAL E/E' RATIO: 4.8 M/S
LV SEPTAL E/E' RATIO: 10.5 M/S
LVED V (TEICH): 80.88 ML
LVES V (TEICH): 29.97 ML
LVOT MG: 2.48 MMHG
LVOT MV: 0.76 CM/S
MV MEAN GRADIENT: 1 MMHG
MV PEAK A VEL: 0.56 M/S
MV PEAK E VEL: 0.63 M/S
MV PEAK GRADIENT: 2 MMHG
MV STENOSIS PRESSURE HALF TIME: 42.41 MS
MV VALVE AREA BY CONTINUITY EQUATION: 4.4 CM2
MV VALVE AREA P 1/2 METHOD: 5.19 CM2
OHS CV RV/LV RATIO: 0.6 CM
OHS LV EJECTION FRACTION SIMPSONS BIPLANE MOD: 65 %
PISA TR MAX VEL: 2.9 M/S
RA PRESSURE ESTIMATED: 3 MMHG
RIGHT VENTRICLE DIASTOLIC BASEL DIMENSION: 2.6 CM
RIGHT VENTRICULAR END-DIASTOLIC DIMENSION: 2.63 CM
RV TB RVSP: 6 MMHG
SINUS: 2.5 CM
TDI LATERAL: 0.13 M/S
TDI SEPTAL: 0.06 M/S
TDI: 0.1 M/S
TR MAX PG: 34 MMHG
TRICUSPID ANNULAR PLANE SYSTOLIC EXCURSION: 2.24 CM
TV REST PULMONARY ARTERY PRESSURE: 37 MMHG
Z-SCORE OF LEFT VENTRICULAR DIMENSION IN END DIASTOLE: -2.23
Z-SCORE OF LEFT VENTRICULAR DIMENSION IN END SYSTOLE: -1.32

## 2025-03-21 NOTE — PROGRESS NOTES
MEDICARE WELLNESS VISIT + SOAP NOTE    Patient Care Team:  Ozzie Daniels MD as PCP - General (Family Medicine)  Tyrel Oliver MD (Internal Medicine - Pulmonary Disease)    Brandyn presents for his Welcome to Medicare Medicare Wellness Visit with MWV Scheduling Outreach and Derm Problem (Left hip lump size of a pea, x 3 years )        Status MWV Topics Details (Refresh Note to Update)    Depression Screening (Trend)   PHQ2 Interpretation Negative          PHQ2: Score = 0     Depression screening is negative no further plan needed.      Blood Pressure  Weight  BMI     /82  Current Weight 173 lbs  body mass index is 27.1 kg/m².  BMI is in overweight range.    30+ minutes of physical activity a day, Caloric restriction, and Low carbohydrate diet         Advanced Directives on File Yes on file.        Health Risk Assessment  (Click to Review or Edit)   Completed      Falls Risk  Have you had a fall in the past year?................................. No  Do you feel unsteady when standing or walking?........ No  Do you worry about falling?.................................................. No  Gait/Balance: Normal      Tobacco/Alcohol/Drugs Never Smoker      reports no history of alcohol use.   reports no history of drug use.      Opioid Review (Update Meds)      No opioid on med list.  is not taking opioid medications.      Cognitive/Functional Status  (Optional MOCA  Mini Cog)   Preexisting cognitive issues - stable.    Vision and Hearing Screens    Hearing Screening - Comments:: Passed bilat finger rub hearing test   Vision Screening - Comments:: Declines eye exam Hearing Screening - Comments:: Passed bilat finger rub hearing test   Vision Screening - Comments:: Declines eye exam       Care Gaps/Screenings  (Click to Review and Order) See patient instructions and orders         The following items on the Medicare Health Risk Assessment were found to be positive  2.) Would you like additional  [facility]   Cardiology Clinic - Follow Up     Date of Visit: 5/15/2023  Reason for Visit/Chief Complaint:   Chief Complaint    Follow-up; denies chest pains or sob          I have explained to Ms. Zahira العراقي that I am not a cardiologist. She understands that I am an internal medicine physician seeing patients in the cardiology clinic. Ms. Zahira العراقي has expressed understanding of this fact and is willing to proceed with this visit.     The patient was discussed with the cardiologist at the time of the appointment.     History of Present Illness:      Zahira العراقي is a 47 y.o. female with a PMH significant for bioprosthetic aortic valve replacement in 2012 2/2 endocarditis with revisional mechanical aortic valve replacement in October who presents to cardiology clinic for follow up. She completed an Echocardiogram on June 4, 2021 which revealed an ejection fraction of approximately 60%, normally functioning mechanical prosthetic aortic valve, mild AR, and mild TR. Of note, after her valve replacement surgery in 2018, she developed A Fib and remains on Coumadin.     She presents today without cardiac complaints. She is very concerned about her coumadin as she is out of her medication and as of last Thursday, we not longer have a coumadin clinic at this hospital. Patient has tried several times to get in touch with the clinic as she cannot go without coumadin and she is currently out. She finally got in touch with them today and they cannot check her INR or refill her coumadin today. I explained that we do not manage coumadin in this clinic but that I would give her a one time prescription (with refill) and we would check her INR.     CP:  The patient has no chest discomfort.      SOB:  The patient denies shortness of breath. Has LOPEZ    EDEMA:  The patient denies edema.      ORTHOPNEA:  The patient denies orthopnea.  No PND.      SYNCOPE:  The patient denies  information on advance directives?: Yes     6 a.) How many servings of Fruits and Vegetables do you have each day ( 1 serving = 1 piece of fruit, 1/2 cup fruits or vegetables): 1 per day     6 d.) How many servings of Sugar Sweetened Beverages do you have each day ( 1 serving = 1 can or 12 oz cup of sode or juice): 2 per day     8.) During the past 4 weeks, has your physical and emotional health limited your social activities with family, friends, neighbors, or other groups?: Moderately     11f.) Feeling stressed or overwhelmed: Often     13.) Do you need help with any of the following activities?: Get to places outside of walking distance (can't drive alone, or take a bus/taxi alone), Go shopping for groceries or clothes, Do your housework or laundry, Prepare a meal, Handle your own money     15.) How confident are you that you can control and manage most of your health problems?: Not very confident       I reviewed and updated the past Medical, Surgical, Family, Social History, Allergies and Medications in Epic: Yes    Family History   Problem Relation Age of Onset    Asthma Mother     Depression Father         mother reports similar traits as patient    Psychiatric Father     Other Father         Aspergers    Patient is unaware of any medical problems Sister     Anxiety disorder Brother     ADHD/ADD Brother     Cancer Maternal Grandmother         breast    Cancer Maternal Grandfather         prostate    Depression Maternal Grandfather     Hypertension Maternal Grandfather     Substance Abuse Maternal Grandfather     Osteoarthritis Paternal Grandmother     Hypertension Paternal Grandmother     Hyperlipidemia Paternal Grandmother     Depression Paternal Grandmother     Heart disease Paternal Grandfather     Hypertension Paternal Grandfather     Hyperlipidemia Paternal Grandfather     Depression Paternal Uncle         per mother, possible mild autism    Bipolar disorder Neg Hx     Schizophrenia Neg Hx          SOAP  near syncope.  No syncope.   Has dizziness.    PALPITATIONS:  The patient has palpitations.    LEVEL OF EXERTION:  The patient exercises and does not have symptoms with this level of exertion.  The patient's level of exertion is good.    Past Medical History:        Past Medical History:   Diagnosis Date    Anticoagulant long-term use     Diabetes mellitus     Hyperlipidemia     Hypertension     Iron deficiency anemia     Paroxysmal atrial fibrillation     Thyroid disease        Surgical History:        Past Surgical History:   Procedure Laterality Date    AORTIC VALVE REPLACEMENT N/A      SECTION      3     TUBAL LIGATION         Family History:        Family History   Problem Relation Age of Onset    Diabetes Mother     Aneurysm Mother     Stroke Mother     No Known Problems Father     Breast cancer Maternal Grandmother 50        diagnosed twice: 50y,60y    Stomach cancer Maternal Grandmother 70    Breast cancer Paternal Grandmother     Brain cancer Paternal Grandmother     Lymphoma Paternal Grandfather        Social History:        Social History     Tobacco Use    Smoking status: Former     Types: Cigarettes    Smokeless tobacco: Never   Substance Use Topics    Alcohol use: Not Currently    Drug use: Not Currently     Types: Marijuana     Comment: once in a blue moon       Allergies:         Review of patient's allergies indicates:   Allergen Reactions    Lamotrigine Swelling    Ondansetron hcl Swelling    Ondansetron Rash    Ondansetron hcl (pf) Rash       Medications:        Current Outpatient Medications   Medication Sig Dispense Refill    albuterol (PROVENTIL/VENTOLIN HFA) 90 mcg/actuation inhaler Inhale 2 puffs into the lungs every 6 (six) hours as needed for Shortness of Breath. Rescue 18 g 2    albuterol-ipratropium (DUO-NEB) 2.5 mg-0.5 mg/3 mL nebulizer solution Take by nebulization every 6 (six) hours as needed.      aspirin (ECOTRIN) 81 MG EC tablet Take 81 mg by mouth once daily.       NOTE     Subjective     Brandyn is a 23 year old here for [facility] Scheduling Outreach and Derm Problem (Left hip lump size of a pea, x 3 years )  The patient presents for evaluation of weight gain, hip nodule, elevated heart rate, acne, and diabetes.    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 His current exercise regimen includes walking on a treadmill for 5 to 10 minutes during his work breaks and for approximately 30 minutes when at home. He also incorporates light weightlifting into his routine. He is on insulin and a high cholesterol medication.    He reports a persistent spot on his hip that has been present for several years but has not changed in size or caused any discomfort. He did not notice it until recently.    He typically experiences anxiety and irregular heart rate. He does not monitor his heart rate at home. He was previously on Concerta for a month due to insurance issues with Focalin but has since resumed Focalin without any side effects. He reports that Focalin is effective in managing his symptoms without causing anxiety or sleep disturbances.    He has discontinued the use of clindamycin gel and is currently using clobetasol cream for skin irritation and itching on his fingers, which he finds beneficial. He is seeking a refill of doxycycline, which he takes as needed for acne management. He has been inconsistent with the application of the prescribed acne cream.    He is under the care of an endocrinologist for diabetes management. He is on insulin.    Supplemental Information  He is also taking topiramate at night for nocturnal eating and bathroom use.    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    MEDICATIONS  Current: Insulin, Focalin, doxycycline, clobetasol cream, topiramate.  Discontinued: Concerta, clindamycin  atorvastatin (LIPITOR) 20 MG tablet Take 1 tablet (20 mg total) by mouth once daily. 90 tablet 2    busPIRone (BUSPAR) 15 MG tablet Take 1 tablet (15 mg total) by mouth 2 (two) times daily. 180 tablet 2    cyclobenzaprine (FLEXERIL) 10 MG tablet Take 10 mg by mouth 3 (three) times daily.      ergocalciferol (ERGOCALCIFEROL) 50,000 unit Cap Take 1 capsule (50,000 Units total) by mouth every 7 days. 12 capsule 2    famotidine (PEPCID) 40 MG tablet Take 1 tablet (40 mg total) by mouth once daily. 30 tablet 2    ferrous sulfate (FEOSOL) 325 mg (65 mg iron) Tab tablet Take 325 mg by mouth 3 (three) times daily.      FLUoxetine 20 MG capsule Take 1 capsule (20 mg total) by mouth once daily. 90 capsule 2    fluticasone propionate (FLONASE) 50 mcg/actuation nasal spray 1 spray (50 mcg total) by Each Nostril route once daily. 15.8 mL 2    levothyroxine (SYNTHROID) 150 MCG tablet Take 1 tablet (150 mcg total) by mouth once daily. 90 tablet 2    metFORMIN (GLUCOPHAGE) 1000 MG tablet Take 1 tablet (1,000 mg total) by mouth 2 (two) times daily. 180 tablet 2    metoprolol tartrate (LOPRESSOR) 25 MG tablet Take 1 tablet (25 mg total) by mouth 2 (two) times daily. 60 tablet 6    semaglutide (OZEMPIC) 0.25 mg or 0.5 mg(2 mg/1.5 mL) pen injector Inject 0.25 mg into the skin every 7 days. 2.25 mL 2    triamterene-hydrochlorothiazide 37.5-25 mg (DYAZIDE) 37.5-25 mg per capsule Take 1 capsule by mouth every morning. 30 capsule 6    warfarin (COUMADIN) 6 MG tablet Take 1 tablet oral at bedtime. 30 tablet 2    traZODone (DESYREL) 100 MG tablet Take 1 tablet (100 mg total) by mouth nightly as needed for Insomnia. 30 tablet 2     No current facility-administered medications for this visit.       I have reviewed and updated the patient's medications, allergies, past medical history, surgical history, social history and family history as needed.    Review of Systems:      Review of Systems   Constitutional:  Negative for chills, diaphoresis  "and fever.   HENT:  Negative for hearing loss and nosebleeds.    Eyes:  Negative for blurred vision.   Respiratory:  Negative for shortness of breath.    Cardiovascular:  Negative for chest pain, palpitations, orthopnea, claudication and PND.   Gastrointestinal:  Positive for nausea. Negative for vomiting.   Genitourinary:  Negative for dysuria.   Musculoskeletal:  Negative for myalgias.   Skin:  Negative for rash.   Neurological:  Negative for dizziness and headaches.     Objective:        Vitals:    05/15/23 1044   BP: 118/74   Pulse: 88   Resp: 20   Temp: 98.2 °F (36.8 °C)     Wt Readings from Last 3 Encounters:   05/15/23 87.9 kg (193 lb 12.6 oz)   03/27/23 90.7 kg (200 lb)   12/01/22 96.2 kg (212 lb)     Temp Readings from Last 3 Encounters:   05/15/23 98.2 °F (36.8 °C) (Oral)   03/27/23 98.6 °F (37 °C) (Oral)   12/01/22 98.6 °F (37 °C) (Oral)     BP Readings from Last 3 Encounters:   05/15/23 118/74   03/27/23 107/75   12/01/22 124/88     Pulse Readings from Last 3 Encounters:   05/15/23 88   03/27/23 73   12/01/22 77       Vitals:    05/15/23 1044   BP: 118/74   BP Location: Left arm   Patient Position: Sitting   BP Method: X-Large (Automatic)   Pulse: 88   Resp: 20   Temp: 98.2 °F (36.8 °C)   TempSrc: Oral   SpO2: 100%   Weight: 87.9 kg (193 lb 12.6 oz)   Height: 5' 4" (1.626 m)     Body mass index is 33.26 kg/m².    Physical Exam  Constitutional:       Appearance: She is well-developed.   HENT:      Head: Normocephalic and atraumatic.   Eyes:      Conjunctiva/sclera: Conjunctivae normal.   Neck:      Vascular: No carotid bruit or JVD.   Cardiovascular:      Rate and Rhythm: Normal rate and regular rhythm.      Chest Wall: PMI is not displaced.      Pulses: Normal pulses and intact distal pulses.      Heart sounds: No midsystolic click. No murmur heard.    No friction rub. No gallop.   Pulmonary:      Effort: Pulmonary effort is normal.      Breath sounds: Normal breath sounds.   Abdominal:      General: " gel.  Review of Systems  As documented above.    SDOH Never Smoker        Objective   Vitals:    03/21/25 1636   BP: (!) 148/82   Pulse: (!) 114   SpO2: 98%   Weight: 78.5 kg (173 lb)   Height: 5' 7\" (1.702 m)   BMI (Calculated): 27.1     Physical Exam  GENERAL:  The patient is alert and oriented x3, in no acute distress.  Well nourished.  HEENT:       Head: Atraumatic, normocephalic.       Eyes: Conjunctivae are noninjected with no icterus, no discharge.  Pupils are equally round and reactive to light.  Extraocular muscles are intact.       Ears: Tympanic membranes are pearly gray with a good light reflex, no erythema or bulging.  Clear external ear canals.       Nose:  No congestion, erythema, edema or discharge.  No maxillary sinus tenderness.   Oropharynx:  Mucous membranes are moist, no erythema or edema, no exudate.    NECK:  Supple with no cervical or supraclavicular lymphadenopathy, no masses.  Normal range of motion.  No JVD or carotid bruits.  RESPIRATORY:  Respiratory effort is normal.  Lungs are clear to auscultation bilaterally with equal breath sounds.  CARDIO: Tachycardic rate at 114, regular rhythm.  No murmurs, rubs or gallops.    ABDOMEN:  Soft, nondistended, nontender.  Normal bowel sounds in all 4 quadrants.  No hepatosplenomegaly, no masses, no rebound or guarding.    EXTREMITIES:  No edema.  Warm and well perfused.  2+ pedal pulses.  SKIN:  Warm and dry.  Rash at all fingertips with some bite marks.  Approximately 1 cm diameter nontender nodule in the vicinity of the left hip. See picture in chart  MUSCULOSKELETAL:  Joints had no swelling or obvious deformities.  Patient had normal range of motion.  NEUROLOGIC:  Grossly normal.  PSYCH: Normal affect, mood, and behavior.    Results:  EKG previously completed 12/16/2021 showed normal sinus rhythm, normal axis and intervals, no ischemic ST-T changes    Labs completed 3/24/2025:  CMP shows mildly elevated blood sugar at 151, mildly elevated LFTs  Abdomen is flat. Bowel sounds are normal.   Musculoskeletal:      Right lower leg: No edema.      Left lower leg: No edema.   Skin:     General: Skin is warm and dry.   Neurological:      Mental Status: She is alert. Mental status is at baseline.   Psychiatric:         Mood and Affect: Mood normal.         Behavior: Behavior normal. Behavior is cooperative.         Judgment: Judgment normal.        Labs:      I have reviewed the following labs below:      CBC:  Lab Results   Component Value Date    WBC 8.3 12/01/2022    HGB 13.3 12/01/2022    HCT 40.4 12/01/2022     12/01/2022    MCV 81.8 12/01/2022    RDW 14.6 12/01/2022     BMP:  Lab Results   Component Value Date     03/27/2023    K 3.5 03/27/2023    CO2 29 03/27/2023    BUN 7.2 03/27/2023    CALCIUM 9.4 03/27/2023    MG 1.55 (L) 05/19/2021    PHOS 3.6 10/19/2018     LFTs:  Lab Results   Component Value Date    ALBUMIN 4.0 03/27/2023    BILITOT 0.7 03/27/2023    AST 35 (H) 03/27/2023    ALKPHOS 48 03/27/2023    ALT 35 03/27/2023     FLP:  Cholesterol Total   Date Value Ref Range Status   12/01/2022 124 <=200 mg/dL Final     HDL Cholesterol   Date Value Ref Range Status   12/01/2022 45 35 - 60 mg/dL Final     LDL Cholesterol   Date Value Ref Range Status   12/01/2022 59.00 50.00 - 140.00 mg/dL Final     Triglyceride   Date Value Ref Range Status   12/01/2022 98 37 - 140 mg/dL Final     DM:  Lab Results   Component Value Date    HGBA1C 6.2 03/27/2023    HGBA1C 8.4 (H) 12/01/2022    HGBA1C 6.8 06/01/2022    CREATININE 0.70 03/27/2023     Thyroid:  Lab Results   Component Value Date    TSH 1.3136 06/01/2022     Coags:  Lab Results   Component Value Date    INR 2.81 (H) 05/05/2022    PROTIME 28.8 (H) 05/05/2022      Cardiac:  Lab Results   Component Value Date    TROPONINI <0.02 07/28/2019    TROPONINI <0.02 12/09/2018    TROPONINI 1.30 (AA) 10/19/2018    TROPONINI 1.54 (AA) 10/19/2018    TROPONINI 1.66 (AA) 10/19/2018     (H) 07/28/2019      including AST 39, ALT 93  Lipid panel shows , HDL 41, ,   Hemoglobin A1c 7.2 on 11/27/2024  TSH 2.52 on 7/8/2024      ASSESSMENT AND PLAN      Medicare wellness visit: See above    1. Weight gain.  He has experienced a weight gain of 33 pounds since October 2024. This is likely due to his insulin therapy rather than his high cholesterol medication. He is advised to continue his current exercise regimen, which includes walking on the treadmill and using smaller weights.    2. Hip nodule.  A nodule measuring approximately 1 cm is present on his hip. It has been stable for about 3 years and does not cause any discomfort. A photograph of the nodule was taken for documentation purposes. If the nodule increases in size, an ultrasound will be ordered for further evaluation.    3. Elevated heart rate.  His heart rate is slightly elevated at 115 bpm, likely due to anxiety. He is advised to monitor his heart rate at home occasionally.  Mom states dad can check pulse and BP [place] the fire station.  A nurse visit can be arranged to recheck his heart rate if necessary.  If persistent heart rate elevation can consider lowering dose of Lexapro or Focalin    4. Acne.  He has been inconsistent with the application of the prescribed acne cream. A refill of doxycycline will be provided, which he can take as needed for acne management. The clindamycin gel will be discontinued. A couple of refills for clobetasol cream will be provided for skin irritation and itching on his fingers.    5. Diabetes.  He is under the care of an endocrinologist for diabetes management. He is on insulin.    1. Encounter for Medicare annual wellness exam  2. ADHD (attention deficit hyperactivity disorder), combined type  -     Drug Screen Complete, Urine  3. Controlled substance agreement signed  -     Drug Screen Complete, Urine  4. Dermatitis  -     clobetasol (TEMOVATE) 0.05 % cream; Apply twice daily to the fingers.  5. Acne, unspecified  (H) 10/04/2018       Cardiac Studies/Imaging:        I have reviewed the following studies below:      Echocardiogram June 4, 2021:  Left ventricular ejection fraction is measured at approximately 60%.  Structurally normal mitral valve.  Trace mitral regurgitation.  Normally functioning mechanical prosthetic valve in aortic position.  Mild aortic regurgitation present.  Peak velocity across the aortic valve is 2.6m/sec. Peak gradient is 27.27 mmHg. Mean gradient is 13.54mmHg.  Tricuspid valve is structurally normal.  There is mild tricuspid regurgitation with RVSP estimated 49 mmHg.  Mitral valve is structurally normal.  Moderately dilated left atrium.  Mildly dilated right atrium.  Right ventricle global systolic function is mildly reduced.  IVC normal.    Echocardiogram March 2020:  EF 55 to 65%. Normal left ventricle diastolic function.  Mild tricuspid regurgitation  Mild pulmonary hypertension present  Aortic valve: There is a mechanical valve present  Prosthetic aortic valve function is normal.      TTE (Nov 2018):  Left ventricular ejection fraction is measured at approximately 65%.  Right ventricle global systolic function is mildly reduced.  Normal size right ventricle cavity.  Mechanical prosthetic valve present in the aortic position.  Aortic valve mean gradient of 12.85 mm Hg.         Assessment & Plan:      47 y.o. female with the following medical problems:    Mechanical Aortic Valve Replacement 2/2 failure of bioprosthetic aortic valve  Mechanical aortic valve s/p replacement in Oct 2018  EF 60% per Echo June 2021 - normal mechanical aortic valve function, mild AR   There is no longer a coumadin clinic at this facility - no plan in place at this time for these patients  I explained to the patient that we do not monitor or refill coumadin in this clinic but given her mechanical valve, I would refil her coumadin and check INR today to bridge her until new plans can be made for this patient population  acne type  -     doxycycline monohydrate (ADOXA) 100 MG tablet; Take one tablet daily as needed for acne  6. Need for vaccination  -     TDAP (BOOSTRIX)  7. Type 1 diabetes mellitus with hyperglycemia  (CMD)      Return in about 1 year (around 3/21/2026) for MWV.               Atrial Fibrillation 2/2 post-op mechanical valve replacement  No recent episodes  Echo June 2021 - EF 60%   Continue Metoprolol Tartrate 25mg BID   Coumadin refilled and INR checked today     DM  Last A1C - 6.2     Will have repeat blood work today  Defer management to PCP    Obesity  Patient has lost 30 pounds in the past and has maintained it. Since then reports her breathing has significantly improved. Congratulated her and encouraged her to keep it up    HLD  LDL 59  Continue Lipitor 20mg    Return to clinic in 6 months.      Future Appointments   Date Time Provider Department Center   9/18/2023 12:30 PM [facility]   11/22/2023  1:00 PM JIGAR Chau [facility] GYN [place] [facility]         Renetta Jackson DO  Internal Medicine Physician   Department of Medicine   [facility]    05/15/2023 10:52 AM

## 2025-03-28 ENCOUNTER — TELEPHONE (OUTPATIENT)
Dept: INTERNAL MEDICINE | Facility: CLINIC | Age: 50
End: 2025-03-28
Payer: MEDICAID

## 2025-03-28 ENCOUNTER — HOSPITAL ENCOUNTER (OUTPATIENT)
Dept: RADIOLOGY | Facility: HOSPITAL | Age: 50
Discharge: HOME | End: 2025-03-28
Attending: INTERNAL MEDICINE
Payer: MEDICAID

## 2025-03-28 DIAGNOSIS — Z12.39 ENCOUNTER FOR SCREENING FOR MALIGNANT NEOPLASM OF BREAST, UNSPECIFIED SCREENING MODALITY: ICD-10-CM

## 2025-03-28 PROCEDURE — 77063 BREAST TOMOSYNTHESIS BI: CPT | Mod: 26,,, | Performed by: RADIOLOGY

## 2025-03-28 PROCEDURE — 77067 SCR MAMMO BI INCL CAD: CPT | Mod: 26,,, | Performed by: RADIOLOGY

## 2025-03-28 PROCEDURE — 77067 SCR MAMMO BI INCL CAD: CPT | Mod: TC

## 2025-03-28 NOTE — TELEPHONE ENCOUNTER
----- Message from Aniya sent at 3/28/2025  2:26 PM CDT -----  Regarding: [place]/med refill   Preferred Pharmacy:[facility] Last Visit:02/25/25Next Visit: 04/29/25 1. Name of Medication: Vitamin DDosage:Comments: 2. Name of Medication: MetforminDosage:Comments:

## 2025-04-07 RX ORDER — METFORMIN HYDROCHLORIDE 1000 MG/1
1000 TABLET ORAL 2 TIMES DAILY
Qty: 180 TABLET | Refills: 1 | OUTPATIENT
Start: 2025-04-07

## 2025-04-10 ENCOUNTER — TELEPHONE (OUTPATIENT)
Dept: CARDIOLOGY | Facility: HOSPITAL | Age: 50
End: 2025-04-10
Payer: MEDICAID

## 2025-04-29 DIAGNOSIS — E11.9 TYPE 2 DIABETES MELLITUS WITHOUT COMPLICATION, WITHOUT LONG-TERM CURRENT USE OF INSULIN: Primary | Chronic | ICD-10-CM

## 2025-04-30 ENCOUNTER — ANTI-COAG VISIT (OUTPATIENT)
Dept: CARDIOLOGY | Facility: HOSPITAL | Age: 50
End: 2025-04-30
Payer: MEDICAID

## 2025-04-30 DIAGNOSIS — I48.0 PAROXYSMAL ATRIAL FIBRILLATION: ICD-10-CM

## 2025-04-30 DIAGNOSIS — Z95.2 H/O MECHANICAL AORTIC VALVE REPLACEMENT: ICD-10-CM

## 2025-04-30 DIAGNOSIS — Z79.01 ANTICOAGULATION MONITORING, INR RANGE 2.5-3.5: Primary | ICD-10-CM

## 2025-04-30 LAB
INR PPP: 3.9
PROTHROMBIN TIME: 37.2 SECONDS (ref 12.5–14.5)

## 2025-04-30 PROCEDURE — 85610 PROTHROMBIN TIME: CPT

## 2025-04-30 NOTE — PROGRESS NOTES
POC pt/inr performed.  Noted pt/inr 62.8 / 5.2  Lab drawn per protocol.  Noted pt/inr. Pt will hold warfarin today, then will resume with dosage decrease.

## 2025-05-01 NOTE — PROGRESS NOTES
Coumadin Clinic   The patient had INR checked to evaluate for adequate anticoagulation.  Please see labs below:    Lab Results   Component Value Date    INR 3.9 (H) 04/30/2025    INR 2.3 03/06/2025    INR 4.4 (A) 02/12/2025        Lab Results   Component Value Date    WBC 9.14 11/20/2024    HGB 14.3 11/20/2024    HCT 42.3 11/20/2024    MCV 86.0 11/20/2024     11/20/2024       ASSESS:  The usual recommended INR range is 2.0-3.0 for Afib and 2.5-3.5 for valve replacement.  Consider monitoring the H/H in view the patient being on coumadin.   REC:  Continue current coumadin dose if patient is in the therapeutic range.  Adjust coumadin dose if needed.  Periodically recheck INR  Darrius Richter MD

## 2025-05-08 ENCOUNTER — ANTI-COAG VISIT (OUTPATIENT)
Dept: CARDIOLOGY | Facility: HOSPITAL | Age: 50
End: 2025-05-08
Payer: MEDICAID

## 2025-05-08 DIAGNOSIS — Z95.2 H/O MECHANICAL AORTIC VALVE REPLACEMENT: ICD-10-CM

## 2025-05-08 DIAGNOSIS — I48.0 PAROXYSMAL ATRIAL FIBRILLATION: ICD-10-CM

## 2025-05-08 DIAGNOSIS — Z79.01 ANTICOAGULATION MONITORING, INR RANGE 2.5-3.5: Primary | ICD-10-CM

## 2025-05-08 LAB
CTP QC/QA: YES
INR PPP: 2.7 (ref 2–3)
PROTHROMBIN TIME, POC: NORMAL (ref 2–3)

## 2025-05-08 PROCEDURE — 85610 PROTHROMBIN TIME: CPT

## 2025-05-08 PROCEDURE — 99211 OFF/OP EST MAY X REQ PHY/QHP: CPT

## 2025-05-08 NOTE — PROGRESS NOTES
Coumadin Clinic   The patient had INR checked to evaluate for adequate anticoagulation.  Please see labs below:    Lab Results   Component Value Date    INR 2.7 05/08/2025    INR 3.9 (H) 04/30/2025    INR 2.3 03/06/2025        Lab Results   Component Value Date    WBC 9.14 11/20/2024    HGB 14.3 11/20/2024    HCT 42.3 11/20/2024    MCV 86.0 11/20/2024     11/20/2024       ASSESS:  The usual recommended INR range is 2.0-3.0 for Afib and 2.5-3.5 for valve replacement.  Consider monitoring the H/H in view the patient being on coumadin.   REC:  Continue current coumadin dose if patient is in the therapeutic range.  Adjust coumadin dose if needed.  Periodically recheck INR  Darrius Richter MD

## 2025-05-20 ENCOUNTER — LAB VISIT (OUTPATIENT)
Dept: LAB | Facility: HOSPITAL | Age: 50
End: 2025-05-20
Attending: INTERNAL MEDICINE
Payer: MEDICAID

## 2025-05-20 ENCOUNTER — CLINICAL SUPPORT (OUTPATIENT)
Dept: INTERNAL MEDICINE | Facility: CLINIC | Age: 50
End: 2025-05-20
Attending: INTERNAL MEDICINE
Payer: MEDICAID

## 2025-05-20 ENCOUNTER — OFFICE VISIT (OUTPATIENT)
Dept: INTERNAL MEDICINE | Facility: CLINIC | Age: 50
End: 2025-05-20
Payer: MEDICAID

## 2025-05-20 VITALS
OXYGEN SATURATION: 98 % | HEART RATE: 84 BPM | DIASTOLIC BLOOD PRESSURE: 84 MMHG | SYSTOLIC BLOOD PRESSURE: 128 MMHG | WEIGHT: 182.19 LBS | BODY MASS INDEX: 31.27 KG/M2 | TEMPERATURE: 97 F | RESPIRATION RATE: 16 BRPM

## 2025-05-20 DIAGNOSIS — R80.9 TYPE 2 DIABETES MELLITUS WITH DIABETIC MICROALBUMINURIA, WITHOUT LONG-TERM CURRENT USE OF INSULIN: Primary | ICD-10-CM

## 2025-05-20 DIAGNOSIS — E11.29 TYPE 2 DIABETES MELLITUS WITH DIABETIC MICROALBUMINURIA, WITHOUT LONG-TERM CURRENT USE OF INSULIN: ICD-10-CM

## 2025-05-20 DIAGNOSIS — E03.9 HYPOTHYROIDISM, UNSPECIFIED TYPE: Chronic | ICD-10-CM

## 2025-05-20 DIAGNOSIS — R80.9 TYPE 2 DIABETES MELLITUS WITH DIABETIC MICROALBUMINURIA, WITHOUT LONG-TERM CURRENT USE OF INSULIN: ICD-10-CM

## 2025-05-20 DIAGNOSIS — E11.29 TYPE 2 DIABETES MELLITUS WITH DIABETIC MICROALBUMINURIA, WITHOUT LONG-TERM CURRENT USE OF INSULIN: Primary | ICD-10-CM

## 2025-05-20 DIAGNOSIS — M25.511 RIGHT SHOULDER PAIN, UNSPECIFIED CHRONICITY: ICD-10-CM

## 2025-05-20 DIAGNOSIS — F41.9 ANXIETY: Chronic | ICD-10-CM

## 2025-05-20 DIAGNOSIS — Z13.31 POSITIVE DEPRESSION SCREENING: ICD-10-CM

## 2025-05-20 DIAGNOSIS — Z12.11 COLON CANCER SCREENING: ICD-10-CM

## 2025-05-20 LAB
ANION GAP SERPL CALC-SCNC: 11 MEQ/L
BASOPHILS # BLD AUTO: 0.05 X10(3)/MCL
BASOPHILS NFR BLD AUTO: 0.5 %
BUN SERPL-MCNC: 12.4 MG/DL (ref 7–18.7)
CALCIUM SERPL-MCNC: 10.3 MG/DL (ref 8.4–10.2)
CHLORIDE SERPL-SCNC: 103 MMOL/L (ref 98–107)
CO2 SERPL-SCNC: 27 MMOL/L (ref 22–29)
CREAT SERPL-MCNC: 0.72 MG/DL (ref 0.55–1.02)
CREAT/UREA NIT SERPL: 17
EOSINOPHIL # BLD AUTO: 0.19 X10(3)/MCL (ref 0–0.9)
EOSINOPHIL NFR BLD AUTO: 1.9 %
ERYTHROCYTE [DISTWIDTH] IN BLOOD BY AUTOMATED COUNT: 12.9 % (ref 11.5–17)
EST. AVERAGE GLUCOSE BLD GHB EST-MCNC: 116.9 MG/DL
GFR SERPLBLD CREATININE-BSD FMLA CKD-EPI: >60 ML/MIN/1.73/M2
GLUCOSE SERPL-MCNC: 105 MG/DL (ref 74–100)
HBA1C MFR BLD: 5.7 %
HBA1C MFR BLD: 6.1 %
HCT VFR BLD AUTO: 47.5 % (ref 37–47)
HGB BLD-MCNC: 16.1 G/DL (ref 12–16)
IMM GRANULOCYTES # BLD AUTO: 0.04 X10(3)/MCL (ref 0–0.04)
IMM GRANULOCYTES NFR BLD AUTO: 0.4 %
LYMPHOCYTES # BLD AUTO: 2.62 X10(3)/MCL (ref 0.6–4.6)
LYMPHOCYTES NFR BLD AUTO: 26.5 %
MCH RBC QN AUTO: 30 PG (ref 27–31)
MCHC RBC AUTO-ENTMCNC: 33.9 G/DL (ref 33–36)
MCV RBC AUTO: 88.5 FL (ref 80–94)
MONOCYTES # BLD AUTO: 0.57 X10(3)/MCL (ref 0.1–1.3)
MONOCYTES NFR BLD AUTO: 5.8 %
NEUTROPHILS # BLD AUTO: 6.42 X10(3)/MCL (ref 2.1–9.2)
NEUTROPHILS NFR BLD AUTO: 64.9 %
NRBC BLD AUTO-RTO: 0 %
PLATELET # BLD AUTO: 221 X10(3)/MCL (ref 130–400)
PMV BLD AUTO: 11.4 FL (ref 7.4–10.4)
POTASSIUM SERPL-SCNC: 3.7 MMOL/L (ref 3.5–5.1)
RBC # BLD AUTO: 5.37 X10(6)/MCL (ref 4.2–5.4)
SODIUM SERPL-SCNC: 141 MMOL/L (ref 136–145)
TSH SERPL-ACNC: 1.52 UIU/ML (ref 0.35–4.94)
WBC # BLD AUTO: 9.89 X10(3)/MCL (ref 4.5–11.5)

## 2025-05-20 PROCEDURE — 84443 ASSAY THYROID STIM HORMONE: CPT

## 2025-05-20 PROCEDURE — 36415 COLL VENOUS BLD VENIPUNCTURE: CPT

## 2025-05-20 PROCEDURE — 83036 HEMOGLOBIN GLYCOSYLATED A1C: CPT

## 2025-05-20 PROCEDURE — 83036 HEMOGLOBIN GLYCOSYLATED A1C: CPT | Mod: PBBFAC | Performed by: INTERNAL MEDICINE

## 2025-05-20 PROCEDURE — 92228 IMG RTA DETC/MNTR DS PHY/QHP: CPT | Mod: TC,PBBFAC | Performed by: INTERNAL MEDICINE

## 2025-05-20 PROCEDURE — 85025 COMPLETE CBC W/AUTO DIFF WBC: CPT

## 2025-05-20 PROCEDURE — 99214 OFFICE O/P EST MOD 30 MIN: CPT | Mod: PBBFAC,25 | Performed by: INTERNAL MEDICINE

## 2025-05-20 PROCEDURE — 92228 IMG RTA DETC/MNTR DS PHY/QHP: CPT | Mod: PBBFAC

## 2025-05-20 PROCEDURE — 80048 BASIC METABOLIC PNL TOTAL CA: CPT

## 2025-05-20 RX ORDER — HYDROCODONE BITARTRATE AND ACETAMINOPHEN 5; 325 MG/1; MG/1
1 TABLET ORAL EVERY 6 HOURS PRN
Qty: 10 TABLET | Refills: 0 | Status: SHIPPED | OUTPATIENT
Start: 2025-05-20

## 2025-05-20 RX ORDER — FLUOXETINE 20 MG/1
40 CAPSULE ORAL DAILY
Qty: 90 CAPSULE | Refills: 3 | Status: SHIPPED | OUTPATIENT
Start: 2025-05-20

## 2025-05-20 RX ORDER — SEMAGLUTIDE 2.68 MG/ML
2 INJECTION, SOLUTION SUBCUTANEOUS
Qty: 4 ML | Refills: 6 | Status: SHIPPED | OUTPATIENT
Start: 2025-05-20

## 2025-05-20 NOTE — PROGRESS NOTES
Zahira العراقي is a 49 y.o. female here for a diabetic eye screening with non-dilated fundus photos per Kaylee Rosenbaum MD.    Patient cooperative?: Yes  Small pupils?: No  Last eye exam: unknown    For exam results, see Encounter Report.
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
## 2025-05-29 ENCOUNTER — TELEPHONE (OUTPATIENT)
Dept: CARDIOLOGY | Facility: HOSPITAL | Age: 50
End: 2025-05-29
Payer: MEDICAID

## 2025-06-05 ENCOUNTER — TELEPHONE (OUTPATIENT)
Dept: CARDIOLOGY | Facility: HOSPITAL | Age: 50
End: 2025-06-05
Payer: MEDICAID

## 2025-06-05 DIAGNOSIS — M25.511 RIGHT SHOULDER PAIN, UNSPECIFIED CHRONICITY: Primary | ICD-10-CM

## 2025-06-12 ENCOUNTER — TELEPHONE (OUTPATIENT)
Dept: CARDIOLOGY | Facility: HOSPITAL | Age: 50
End: 2025-06-12
Payer: MEDICAID

## 2025-06-27 ENCOUNTER — HOSPITAL ENCOUNTER (OUTPATIENT)
Dept: RADIOLOGY | Facility: HOSPITAL | Age: 50
Discharge: HOME | End: 2025-06-27
Attending: FAMILY MEDICINE
Payer: MEDICAID

## 2025-06-27 ENCOUNTER — OFFICE VISIT (OUTPATIENT)
Dept: URGENT CARE | Facility: CLINIC | Age: 50
End: 2025-06-27
Payer: MEDICAID

## 2025-06-27 VITALS
DIASTOLIC BLOOD PRESSURE: 87 MMHG | TEMPERATURE: 98 F | WEIGHT: 177 LBS | HEART RATE: 93 BPM | RESPIRATION RATE: 18 BRPM | HEIGHT: 64 IN | OXYGEN SATURATION: 98 % | BODY MASS INDEX: 30.22 KG/M2 | SYSTOLIC BLOOD PRESSURE: 132 MMHG

## 2025-06-27 DIAGNOSIS — R05.9 COUGH, UNSPECIFIED TYPE: ICD-10-CM

## 2025-06-27 DIAGNOSIS — B96.89 SINUSITIS, BACTERIAL: Primary | ICD-10-CM

## 2025-06-27 DIAGNOSIS — J32.9 SINUSITIS, BACTERIAL: Primary | ICD-10-CM

## 2025-06-27 PROCEDURE — 71046 X-RAY EXAM CHEST 2 VIEWS: CPT | Mod: TC

## 2025-06-27 PROCEDURE — 99215 OFFICE O/P EST HI 40 MIN: CPT | Mod: PBBFAC,25 | Performed by: FAMILY MEDICINE

## 2025-06-27 RX ORDER — DOXYCYCLINE 100 MG/1
100 CAPSULE ORAL EVERY 12 HOURS
Qty: 20 CAPSULE | Refills: 0 | Status: SHIPPED | OUTPATIENT
Start: 2025-06-27 | End: 2025-07-07

## 2025-06-27 RX ORDER — PREDNISONE 20 MG/1
20 TABLET ORAL DAILY
Qty: 3 TABLET | Refills: 0 | Status: SHIPPED | OUTPATIENT
Start: 2025-06-27 | End: 2025-06-30

## 2025-06-27 RX ORDER — PROMETHAZINE HYDROCHLORIDE AND DEXTROMETHORPHAN HYDROBROMIDE 6.25; 15 MG/5ML; MG/5ML
5 SYRUP ORAL
Qty: 120 ML | Refills: 0 | Status: SHIPPED | OUTPATIENT
Start: 2025-06-27

## 2025-06-27 NOTE — PROGRESS NOTES
"Subjective:       Patient ID: Zahira العراقي is a 49 y.o. female.    Vitals:  height is 5' 4" (1.626 m) and weight is 80.3 kg (177 lb). Her temperature is 98.2 °F (36.8 °C). Her blood pressure is 132/87 and her pulse is 93. Her respiration is 18 and oxygen saturation is 98%.     Chief Complaint: URI (Cough, congestion x 2wks)    Patient with 2 weeks of cough, sinus congestion, postnasal drip.  Subjective fever.  No purulent rhinorrhea.  She has a history of endocarditis, mechanical aortic valve.  She is concerned about pneumonia.  No purulent sputum.    All other systems are negative    Chart reviewed    Objective:   Physical Exam   Constitutional: She appears well-developed.  Non-toxic appearance. She does not appear ill. No distress.   HENT:   Head: Atraumatic.   Nose: No purulent discharge. Right sinus exhibits maxillary sinus tenderness. Right sinus exhibits no frontal sinus tenderness. Left sinus exhibits maxillary sinus tenderness. Left sinus exhibits no frontal sinus tenderness.   Mouth/Throat: Uvula is midline.   Eyes: Right eye exhibits no discharge. Left eye exhibits no discharge. Extraocular movement intact   Neck: Neck supple. No neck rigidity present.   Cardiovascular: Regular rhythm.   Pulmonary/Chest: Effort normal and breath sounds normal. No respiratory distress. She has no wheezes. She has no rhonchi. She has no rales.   Lymphadenopathy:     She has no cervical adenopathy.   Neurological: She is alert.   Skin: Skin is warm, dry and not diaphoretic.   Psychiatric: Her behavior is normal.   Nursing note and vitals reviewed.    CXR- no obvious acute changes.  Radiology interpretation is pending.    Assessment:     1. Cough, unspecified type            Plan:   Will notify if radiology interpretation is different.      Will treat as presumed bacterial sinusitis.  Choosing doxycycline to cover potential atypicals.  We discussed potential effects on INR.  She will contact the provider at her " [facility] to notify of new medications and for close monitoring.      Take medications as prescribed.  Consider intranasal steroids like Flonase and other over-the-counter medications to treat your symptoms.  Consider saline nasal rinses.      Please follow instructions on patient education material.  Follow up with your PCP or return to urgent care in 4-5 days if symptoms are not improving. Seek care immediately if new or worsening symptoms develop.      Cough, unspecified type  -     XR CHEST PA AND LATERAL; Future; Expected date: 06/27/2025        Portions of this report were dictated using voice recognition software. Content is subject to voice recognition errors

## 2025-07-01 ENCOUNTER — ANTI-COAG VISIT (OUTPATIENT)
Dept: CARDIOLOGY | Facility: HOSPITAL | Age: 50
End: 2025-07-01
Payer: MEDICAID

## 2025-07-01 DIAGNOSIS — Z95.2 H/O MECHANICAL AORTIC VALVE REPLACEMENT: ICD-10-CM

## 2025-07-01 DIAGNOSIS — Z79.01 ANTICOAGULATION MONITORING, INR RANGE 2.5-3.5: ICD-10-CM

## 2025-07-01 DIAGNOSIS — I48.0 PAROXYSMAL ATRIAL FIBRILLATION: ICD-10-CM

## 2025-07-01 LAB
INR PPP: 4.1
PROTHROMBIN TIME: 38.7 SECONDS (ref 12.5–14.5)

## 2025-07-01 PROCEDURE — 85610 PROTHROMBIN TIME: CPT

## 2025-07-01 NOTE — PROGRESS NOTES
Coumadin Clinic   The patient had INR checked to evaluate for adequate anticoagulation.  Please see labs below:    Lab Results   Component Value Date    INR 4.1 (H) 07/01/2025    INR 2.7 05/08/2025    INR 3.9 (H) 04/30/2025      Lab Results   Component Value Date    HGB 16.1 (H) 05/20/2025    HGB 14.3 11/20/2024    HGB 15.1 02/20/2024      ASSESS:  The usual recommended INR range is 2.0-3.0 for Afib and 2.5-3.5 for valve replacement.  Consider monitoring the H/H in view the patient being on coumadin.   REC:  Continue current coumadin dose if patient is in the therapeutic range.  Adjust coumadin dose if needed.  Periodically recheck INR  Darrius Richetr MD

## 2025-07-01 NOTE — PROGRESS NOTES
POC pt/inr performed.  Noted pt/inr 60.9/5.1 Lab drawn per protocol.  Noted pt/inr.  Pt will hold warfarin for 2 days, then resume dosing.

## 2025-07-09 ENCOUNTER — ANTI-COAG VISIT (OUTPATIENT)
Dept: CARDIOLOGY | Facility: HOSPITAL | Age: 50
End: 2025-07-09
Payer: MEDICAID

## 2025-07-09 DIAGNOSIS — Z79.01 ANTICOAGULATION MONITORING, INR RANGE 2.5-3.5: Primary | ICD-10-CM

## 2025-07-09 DIAGNOSIS — Z95.2 H/O MECHANICAL AORTIC VALVE REPLACEMENT: ICD-10-CM

## 2025-07-09 DIAGNOSIS — I48.0 PAROXYSMAL ATRIAL FIBRILLATION: ICD-10-CM

## 2025-07-09 LAB
CTP QC/QA: YES
INR PPP: 2 (ref 2–3)
PROTHROMBIN TIME, POC: NORMAL (ref 2–3)

## 2025-07-09 PROCEDURE — 85610 PROTHROMBIN TIME: CPT

## 2025-07-09 NOTE — PROGRESS NOTES
Coumadin Clinic   The patient had INR checked to evaluate for adequate anticoagulation.  Please see labs below:    Lab Results   Component Value Date    INR 2.0 07/09/2025    INR 4.1 (H) 07/01/2025    INR 2.7 05/08/2025      Lab Results   Component Value Date    HGB 16.1 (H) 05/20/2025    HGB 14.3 11/20/2024    HGB 15.1 02/20/2024      ASSESS:  The usual recommended INR range is 2.0-3.0 for Afib and 2.5-3.5 for valve replacement.  Consider monitoring the H/H in view the patient being on coumadin.   REC:  Continue current coumadin dose if patient is in the therapeutic range.  Adjust coumadin dose if needed.  Periodically recheck INR  Darrius Richter MD

## 2025-07-31 ENCOUNTER — TELEPHONE (OUTPATIENT)
Dept: CARDIOLOGY | Facility: HOSPITAL | Age: 50
End: 2025-07-31
Payer: MEDICAID

## 2025-08-05 ENCOUNTER — TELEPHONE (OUTPATIENT)
Dept: CARDIOLOGY | Facility: HOSPITAL | Age: 50
End: 2025-08-05
Payer: MEDICAID

## 2025-08-14 ENCOUNTER — TELEPHONE (OUTPATIENT)
Dept: CARDIOLOGY | Facility: HOSPITAL | Age: 50
End: 2025-08-14
Payer: MEDICAID

## 2025-08-28 ENCOUNTER — TELEPHONE (OUTPATIENT)
Dept: CARDIOLOGY | Facility: HOSPITAL | Age: 50
End: 2025-08-28
Payer: MEDICAID

## 2025-09-04 ENCOUNTER — TELEPHONE (OUTPATIENT)
Dept: CARDIOLOGY | Facility: HOSPITAL | Age: 50
End: 2025-09-04
Payer: MEDICAID